# Patient Record
Sex: MALE | Race: WHITE
[De-identification: names, ages, dates, MRNs, and addresses within clinical notes are randomized per-mention and may not be internally consistent; named-entity substitution may affect disease eponyms.]

---

## 2021-06-25 ENCOUNTER — HOSPITAL ENCOUNTER (INPATIENT)
Age: 61
LOS: 14 days | Discharge: NURSING, REHAB OR LONG-TERM CARE | DRG: 180 | End: 2021-07-09
Admitting: FAMILY MEDICINE
Payer: MEDICARE

## 2021-06-25 DIAGNOSIS — Z98.890: ICD-10-CM

## 2021-06-25 DIAGNOSIS — K21.9: ICD-10-CM

## 2021-06-25 DIAGNOSIS — C34.82: Primary | ICD-10-CM

## 2021-06-25 DIAGNOSIS — Z87.81: ICD-10-CM

## 2021-06-25 DIAGNOSIS — Z66: ICD-10-CM

## 2021-06-25 DIAGNOSIS — F17.210: ICD-10-CM

## 2021-06-25 DIAGNOSIS — E78.5: ICD-10-CM

## 2021-06-25 DIAGNOSIS — Z87.39: ICD-10-CM

## 2021-06-25 DIAGNOSIS — C78.7: ICD-10-CM

## 2021-06-25 DIAGNOSIS — M25.569: ICD-10-CM

## 2021-06-25 DIAGNOSIS — R49.0: ICD-10-CM

## 2021-06-25 DIAGNOSIS — J98.11: ICD-10-CM

## 2021-06-25 DIAGNOSIS — Z80.1: ICD-10-CM

## 2021-06-25 DIAGNOSIS — C79.89: ICD-10-CM

## 2021-06-25 DIAGNOSIS — Z99.81: ICD-10-CM

## 2021-06-25 DIAGNOSIS — J20.9: ICD-10-CM

## 2021-06-25 DIAGNOSIS — M19.90: ICD-10-CM

## 2021-06-25 DIAGNOSIS — E11.65: ICD-10-CM

## 2021-06-25 DIAGNOSIS — M48.00: ICD-10-CM

## 2021-06-25 DIAGNOSIS — R59.0: ICD-10-CM

## 2021-06-25 DIAGNOSIS — F32.9: ICD-10-CM

## 2021-06-25 DIAGNOSIS — Z71.6: ICD-10-CM

## 2021-06-25 DIAGNOSIS — Z60.2: ICD-10-CM

## 2021-06-25 DIAGNOSIS — Z79.899: ICD-10-CM

## 2021-06-25 DIAGNOSIS — Z20.822: ICD-10-CM

## 2021-06-25 DIAGNOSIS — J43.9: ICD-10-CM

## 2021-06-25 DIAGNOSIS — T38.0X5A: ICD-10-CM

## 2021-06-25 DIAGNOSIS — Z80.3: ICD-10-CM

## 2021-06-25 DIAGNOSIS — J91.0: ICD-10-CM

## 2021-06-25 DIAGNOSIS — E66.01: ICD-10-CM

## 2021-06-25 DIAGNOSIS — Z87.19: ICD-10-CM

## 2021-06-25 DIAGNOSIS — J96.01: ICD-10-CM

## 2021-06-25 DIAGNOSIS — L29.9: ICD-10-CM

## 2021-06-25 DIAGNOSIS — Z96.643: ICD-10-CM

## 2021-06-25 DIAGNOSIS — Z71.3: ICD-10-CM

## 2021-06-25 DIAGNOSIS — E44.0: ICD-10-CM

## 2021-06-25 DIAGNOSIS — E22.2: ICD-10-CM

## 2021-06-25 PROCEDURE — 84484 ASSAY OF TROPONIN QUANT: CPT

## 2021-06-25 PROCEDURE — 80048 BASIC METABOLIC PNL TOTAL CA: CPT

## 2021-06-25 PROCEDURE — 87070 CULTURE OTHR SPECIMN AEROBIC: CPT

## 2021-06-25 PROCEDURE — 87040 BLOOD CULTURE FOR BACTERIA: CPT

## 2021-06-25 PROCEDURE — 83735 ASSAY OF MAGNESIUM: CPT

## 2021-06-25 PROCEDURE — 84155 ASSAY OF PROTEIN SERUM: CPT

## 2021-06-25 PROCEDURE — 96361 HYDRATE IV INFUSION ADD-ON: CPT

## 2021-06-25 PROCEDURE — 88108 CYTOPATH CONCENTRATE TECH: CPT

## 2021-06-25 PROCEDURE — 76604 US EXAM CHEST: CPT

## 2021-06-25 PROCEDURE — 84100 ASSAY OF PHOSPHORUS: CPT

## 2021-06-25 PROCEDURE — 93005 ELECTROCARDIOGRAM TRACING: CPT

## 2021-06-25 PROCEDURE — 96374 THER/PROPH/DIAG INJ IV PUSH: CPT

## 2021-06-25 PROCEDURE — 85025 COMPLETE CBC W/AUTO DIFF WBC: CPT

## 2021-06-25 PROCEDURE — 88342 IMHCHEM/IMCYTCHM 1ST ANTB: CPT

## 2021-06-25 PROCEDURE — 85610 PROTHROMBIN TIME: CPT

## 2021-06-25 PROCEDURE — 85730 THROMBOPLASTIN TIME PARTIAL: CPT

## 2021-06-25 PROCEDURE — 71045 X-RAY EXAM CHEST 1 VIEW: CPT

## 2021-06-25 PROCEDURE — 36415 COLL VENOUS BLD VENIPUNCTURE: CPT

## 2021-06-25 PROCEDURE — 88341 IMHCHEM/IMCYTCHM EA ADD ANTB: CPT

## 2021-06-25 PROCEDURE — 82805 BLOOD GASES W/O2 SATURATION: CPT

## 2021-06-25 PROCEDURE — 83615 LACTATE (LD) (LDH) ENZYME: CPT

## 2021-06-25 PROCEDURE — 89050 BODY FLUID CELL COUNT: CPT

## 2021-06-25 PROCEDURE — 94760 N-INVAS EAR/PLS OXIMETRY 1: CPT

## 2021-06-25 PROCEDURE — 71046 X-RAY EXAM CHEST 2 VIEWS: CPT

## 2021-06-25 PROCEDURE — 83036 HEMOGLOBIN GLYCOSYLATED A1C: CPT

## 2021-06-25 PROCEDURE — 36600 WITHDRAWAL OF ARTERIAL BLOOD: CPT

## 2021-06-25 PROCEDURE — 36573 INSJ PICC RS&I 5 YR+: CPT

## 2021-06-25 PROCEDURE — 84550 ASSAY OF BLOOD/URIC ACID: CPT

## 2021-06-25 PROCEDURE — 71275 CT ANGIOGRAPHY CHEST: CPT

## 2021-06-25 PROCEDURE — 94640 AIRWAY INHALATION TREATMENT: CPT

## 2021-06-25 PROCEDURE — 87205 SMEAR GRAM STAIN: CPT

## 2021-06-25 PROCEDURE — 85379 FIBRIN DEGRADATION QUANT: CPT

## 2021-06-25 PROCEDURE — 80053 COMPREHEN METABOLIC PANEL: CPT

## 2021-06-25 PROCEDURE — 87635 SARS-COV-2 COVID-19 AMP PRB: CPT

## 2021-06-25 PROCEDURE — 85027 COMPLETE CBC AUTOMATED: CPT

## 2021-06-25 PROCEDURE — 99285 EMERGENCY DEPT VISIT HI MDM: CPT

## 2021-06-25 PROCEDURE — 70553 MRI BRAIN STEM W/O & W/DYE: CPT

## 2021-06-25 PROCEDURE — 88305 TISSUE EXAM BY PATHOLOGIST: CPT

## 2021-06-25 PROCEDURE — 74177 CT ABD & PELVIS W/CONTRAST: CPT

## 2021-06-25 SDOH — SOCIAL STABILITY - SOCIAL INSECURITY: PROBLEMS RELATED TO LIVING ALONE: Z60.2

## 2021-06-26 PROCEDURE — 0W9B3ZX DRAINAGE OF LEFT PLEURAL CAVITY, PERCUTANEOUS APPROACH, DIAGNOSTIC: ICD-10-PCS

## 2021-06-29 PROCEDURE — 02HV33Z INSERTION OF INFUSION DEVICE INTO SUPERIOR VENA CAVA, PERCUTANEOUS APPROACH: ICD-10-PCS

## 2021-07-04 PROCEDURE — 3E04305 INTRODUCTION OF OTHER ANTINEOPLASTIC INTO CENTRAL VEIN, PERCUTANEOUS APPROACH: ICD-10-PCS

## 2021-07-06 PROCEDURE — 0W9B3ZZ DRAINAGE OF LEFT PLEURAL CAVITY, PERCUTANEOUS APPROACH: ICD-10-PCS

## 2021-08-03 ENCOUNTER — HOSPITAL ENCOUNTER (OUTPATIENT)
Dept: HOSPITAL 47 - RADXRMAIN | Age: 61
Discharge: HOME | End: 2021-08-03
Attending: INTERNAL MEDICINE
Payer: MEDICARE

## 2021-08-03 DIAGNOSIS — C34.90: Primary | ICD-10-CM

## 2021-08-03 DIAGNOSIS — I10: ICD-10-CM

## 2021-08-03 DIAGNOSIS — E78.5: ICD-10-CM

## 2021-08-03 DIAGNOSIS — E11.9: ICD-10-CM

## 2021-08-03 PROCEDURE — 71046 X-RAY EXAM CHEST 2 VIEWS: CPT

## 2021-08-03 NOTE — XR
EXAMINATION TYPE: XR chest 2V

 

DATE OF EXAM: 8/3/2021

 

COMPARISON: 7/6/2021

 

TECHNIQUE: PA and lateral views submitted.

 

HISTORY: History of lung cancer

 

FINDINGS:

Diffuse interstitial pattern with left lower lobe consolidation and small effusion. Left-sided PICC l
ine is postsurgical change right shoulder arthropathy of the left shoulder with probable bone infarct
 involving the humeral head. Atherosclerotic change aorta. Underlying COPD.

 

IMPRESSION:

1. Correlate for chronic interstitial lung disease versus interstitial pneumonitis with left lower lo
be infiltrate and small effusion. Mild venous congestion in the differential diagnosis correlate clin
ically.

## 2021-11-05 ENCOUNTER — HOSPITAL ENCOUNTER (OUTPATIENT)
Dept: HOSPITAL 47 - PROCWHC3 | Age: 61
Discharge: HOME | End: 2021-11-05
Attending: INTERNAL MEDICINE
Payer: MEDICARE

## 2021-11-05 ENCOUNTER — HOSPITAL ENCOUNTER (OUTPATIENT)
Dept: HOSPITAL 47 - RADCTMAIN | Age: 61
Discharge: HOME | End: 2021-11-05
Attending: INTERNAL MEDICINE
Payer: MEDICARE

## 2021-11-05 VITALS
SYSTOLIC BLOOD PRESSURE: 121 MMHG | RESPIRATION RATE: 16 BRPM | HEART RATE: 96 BPM | TEMPERATURE: 98.1 F | DIASTOLIC BLOOD PRESSURE: 59 MMHG

## 2021-11-05 DIAGNOSIS — E78.5: ICD-10-CM

## 2021-11-05 DIAGNOSIS — J98.11: ICD-10-CM

## 2021-11-05 DIAGNOSIS — C34.90: Primary | ICD-10-CM

## 2021-11-05 DIAGNOSIS — I10: ICD-10-CM

## 2021-11-05 DIAGNOSIS — C34.92: Primary | ICD-10-CM

## 2021-11-05 DIAGNOSIS — J90: ICD-10-CM

## 2021-11-05 DIAGNOSIS — E11.9: ICD-10-CM

## 2021-11-05 LAB — BUN SERPL-SCNC: 7 MG/DL (ref 9–20)

## 2021-11-05 PROCEDURE — 71260 CT THORAX DX C+: CPT

## 2021-11-05 PROCEDURE — 96523 IRRIG DRUG DELIVERY DEVICE: CPT

## 2021-11-05 PROCEDURE — 84520 ASSAY OF UREA NITROGEN: CPT

## 2021-11-05 PROCEDURE — 74177 CT ABD & PELVIS W/CONTRAST: CPT

## 2021-11-05 PROCEDURE — 82565 ASSAY OF CREATININE: CPT

## 2021-11-05 NOTE — CT
EXAMINATION TYPE: CT ChestAbdPelvis w con

 

DATE OF EXAM: 11/5/2021

 

COMPARISON: 9/2/2021

 

HISTORY: Lung cancer, possible enlarged lymph nodes to neck.

 

CT DLP: 1345.7 mGycm

Automated exposure control for dose reduction was used.

 

CONTRAST: 

CT scan of the chest, abdomen and pelvis is performed with Oral Contrast and with IV Contrast, patien
t injected with 100 mL of Isovue M300.

 

FINDINGS:

 

LUNGS: Background Moderate underlying emphysematous change is redemonstrated. Persistent small left p
leural effusion improved from prior. Mild left basilar linear scarring and/or atelectasis. Tiny left 
pleural effusion. No suspicious nodules or masses clearly seen. Right lung remains clear. Apical soft
 tissue attenuation within the left lung posteriorly stable measures 2.5 cm in greatest axis and prev
iously measured 2.5 cm. Extends to the left hilum is unchanged in appearance. This could be on the ba
sis of atelectasis or scarring rather than neoplasm is not excluded but the findings overall stable.

 

 

MEDIASTINUM: There is marked interval improvement in abnormal lymph nodes in the prevascular region, 
AP window, paratracheal region, and right paratracheal levels. Some irregular confluent elongated 3.7
 x 1.3 cm density in the AP window inferiorly extending towards the left hilum stable. Subcarinal lym
ph node 1.7 x 1.0 cm stable from prior study.. Tiny pericardial effusion is now seen. No cardiomegaly
. Coronary artery calcification noted. Trace of pericardial fluid seen.

 

 

LIVER/GB: No significant abnormality is appreciated.

 

PANCREAS: No significant abnormality is seen.

 

SPLEEN: No significant abnormality is seen.

 

ADRENALS: No significant abnormality is seen.

 

KIDNEYS: No significant abnormality is seen.

 

BOWEL:  No significant abnormality is seen.

 

LYMPH NODES: No greater than 1 cm abdominal or pelvic lymph nodes are appreciated.

 

OSSEOUS STRUCTURES: Metallic hardware from right shoulder surgery is partially imaged. Metallic cover
 from bilateral hip arthroplasties causes streak artifact limiting evaluation of pelvic structures. N
egative osseous structures are redemonstrated demineralized. Vertebroplasty at mild-to-moderate compr
ession fractures T12 and L2 level redemonstrated. Multiple additional compression fractures are seen 
throughout the lumbar and thoracic spine.. Underlying scoliosis noted. Advanced degenerative change l
eft glenohumeral joint

 

OTHER: Moderate to severe calcified plaque of the infrarenal abdominal aorta extends into the iliac b
ranch vessels. Suspected bilateral iliac artery stenoses. Correlate clinically. Left-sided central li
ne or PICC line incidentally noted with tip overlying the SVC.

 

 

IMPRESSION: 

1. Stable previously noted adenopathy unchanged from prior exam. Apical area of consolidation extendi
ng to the left hilum is unchanged from prior exam is not. Extensive changes of emphysema noted

2. . Small left pleural effusion and basilar atelectasis stable.

3. No new areas of mass or adenopathy. Overall the exam is stable from prior exam.

4. Suspect bilateral iliac artery stenoses

## 2021-11-24 ENCOUNTER — HOSPITAL ENCOUNTER (OUTPATIENT)
Dept: HOSPITAL 47 - PNWHC3 | Age: 61
Discharge: HOME | End: 2021-11-24
Attending: STUDENT IN AN ORGANIZED HEALTH CARE EDUCATION/TRAINING PROGRAM
Payer: MEDICARE

## 2021-11-24 VITALS
SYSTOLIC BLOOD PRESSURE: 117 MMHG | RESPIRATION RATE: 18 BRPM | DIASTOLIC BLOOD PRESSURE: 62 MMHG | TEMPERATURE: 98.3 F | HEART RATE: 107 BPM

## 2021-11-24 DIAGNOSIS — Z87.39: ICD-10-CM

## 2021-11-24 DIAGNOSIS — M48.061: ICD-10-CM

## 2021-11-24 DIAGNOSIS — Z96.643: ICD-10-CM

## 2021-11-24 DIAGNOSIS — Z96.611: ICD-10-CM

## 2021-11-24 DIAGNOSIS — Z85.118: ICD-10-CM

## 2021-11-24 DIAGNOSIS — M47.896: Primary | ICD-10-CM

## 2021-11-24 DIAGNOSIS — M54.16: ICD-10-CM

## 2021-11-24 PROCEDURE — 99211 OFF/OP EST MAY X REQ PHY/QHP: CPT

## 2021-11-24 PROCEDURE — 99202 OFFICE O/P NEW SF 15 MIN: CPT

## 2021-11-24 NOTE — P.PAINCN
History of Present Illness





- Reason for Consult


Consult date: 11/24/21





- History of Present Illness





Bhaskar is a 61-year-old male presenting to clinic today for initial evaluation 

for pain management.  Bhaskar has a history of bilateral hip replacement as well

as right shoulder replacement due to avascular necrosis.  This is Priestly 

completed chemotherapy for lung cancer.  Currently looking into immunotherapy 

depending on insurance.  Today he is reporting chronic long-term pain in his low

back there was no precipitating event.  The pain is located to the right side 

and left side of his low back with pain radiating down his legs right equal to 

left pain is radiating to buttocks the lateral portion of thighs to his feet.  

He describes it as a constant sharp pain.  Pain is aggravated with walking and 

long-term standing.  Pain is relieved with sitting.  He has tried medications 

including Norco and tramadol without effect, many years ago he has had 

injections to attempt nerve blocks without success.  Patient also reports she's 

had physical therapy in the past that has not helped and knees had chiropractic 

care in the past which offered him some benefit.  He rates his pain as 10 at 10 

on a 0-to-10 scale.  He denies any bowel or bladder dysfunction, saddle 

anesthesia, or any other red flag symptoms.  He reports that neurosurgery has 

not wanted to do any procedures due to brittle bones in his avascular necrosis.





Past Medical History


Past Medical History: Cancer, COPD, Musculoskeletal Disorder, Osteoarthritis 

(OA)


Additional Past Medical History / Comment(s): LUNG CANCER-currently receiving 

chemo, gastritis, 3 deteriorating discs


History of Any Multi-Drug Resistant Organisms: None Reported


Past Surgical History: Joint Replacement, Orthopedic Surgery


Additional Past Surgical History / Comment(s): mindi hips & rt shoulder replaced


Past Anesthesia/Blood Transfusion Reactions: No Reported Reaction


Smoking Status: Former smoker





- Past Family History


  ** Father


Family Medical History: No Reported History





Medications and Allergies


                                Home Medications











 Medication  Instructions  Recorded  Confirmed  Type


 


Acetylcyst 10% Sol 4 ml INHALATION RT-BID 06/25/21 11/24/21 History


 


Atorvastatin [Lipitor] 20 mg PO DAILY 06/25/21 11/24/21 History


 


OXcarbazepine [Trileptal] 300 mg PO BID 06/25/21 11/24/21 History


 


Pantoprazole Sodium [Protonix] 40 mg PO DAILY 06/25/21 11/24/21 History


 


Sucralfate [Carafate] 1 gm PO ACHS 06/25/21 11/24/21 History


 


traZODone HCL [Desyrel] 100 mg PO HS 06/25/21 11/24/21 History


 


ALPRAZolam [Xanax] 0.25 mg PO TID PRN #6 tab 07/07/21 11/24/21 Rx


 


Acetaminophen Tab [Tylenol] 650 mg PO Q6HR PRN  tab 07/07/21 11/24/21 Rx


 


Budesonide [Pulmicort] 1 mg INHALATION RT-BID  ml 07/07/21 11/24/21 Rx


 


Calcium Carbonate [Tums] 1,000 mg PO TID PRN  chew 07/07/21 11/24/21 Rx


 


Formoterol Fumarate [Perforomist] 20 mcg INHALATION RT-BID  nebu 07/07/21 11/24/21 Rx


 


INSULIN LISPRO (HumaLOG) [humaLOG] 0 unit SQ ACHS #1 vial 07/07/21 11/24/21 Rx


 


Ipratropium-Albuterol Nebulize 3 ml INHALATION Q2HR PRN  ml 07/07/21 11/24/21 Rx





[Duoneb 0.5 mg-3 mg/3 ml Soln]    


 


Ipratropium-Albuterol Nebulize 3 ml INHALATION RT-Q4H  ml 07/07/21 11/24/21 Rx





[Duoneb 0.5 mg-3 mg/3 ml Soln]    


 


Lidocaine Viscous [Xylocaine 30 ml PO QID  ml 07/07/21 11/24/21 Rx





Viscous 2%]    


 


Nicotine 21Mg/24Hr Patch [Habitrol] 1 patch TRANSDERM DAILY  patch 07/07/21 11/24/21 Rx


 


Ondansetron [Zofran] 8 mg PO Q6HR PRN #45 tab 07/07/21 11/24/21 Rx


 


HYDROcodone/APAP 7.5-325MG [Norco 1 tab PO Q6HR PRN 3 Days #12 tab 07/09/21 11/24/21 Rx





7.5-325]    


 


Amitriptyline HCl [Elavil] 1 tab PO DAILY 08/05/21 11/24/21 History








                                    Allergies











Allergy/AdvReac Type Severity Reaction Status Date / Time


 


No Known Allergies Allergy   Verified 11/24/21 11:29














Physical Exam








REVIEW OF ORGAN SYSTEMS:


                     CONSTITUTIONAL:  No fevers or chills. No recent weight 

loss.


                     EYES: denies troubles with vision. 


                     HEENT:  No difficulties with hearing. No nosebleeds.  No 

difficulty swallowing. 


                     RESPIRATORY:  Denies any troubles with breathing or dyspnea

 on exertion.  History of lung cancer


                     CARDIOVASCULAR:  Denies any chest pain, palpitations, or 

recent heart attacks. 


                     GASTROINTESTINAL: Denies fatty food intolerance.  Has 

change in bowel habits and gas bloat.  


                     GENITOURINARY:  Denies any blood in urine.  Has increased 

urinary frequency.  


                      NEUROLOGICAL: +  numbness and tingling along the distal 

extremities. No seizure disorders or headaches.


                      MUSCULOSKELETAL: Has back pain.  Bilateral hip 

arthroplasty and right shoulder arthroplasty; avascular necrosis


                      SKIN:no  skin cancer. No rash.


                      PSYCHIATRIC:  Denies current depression or suicidal 

thoughts.


                      ENDOCRINE:  Denies current thyroid disorders. Denies any 

blood sugar glucose intolerance.


                      HEME/LYMPHATIC: Denies any lumps and bumps around the 

neck.  History of deep venous thrombosis.


                      ALLERGY/IMMUNOLOGY:  No immunoglobulin therapy. No immune 

deficiencies.


                      BREAST: Denies current breast lumps, pain or nipple 

discharge.


    


   Physical Examinations  :


                Constitutiona       : Cooperative , not in acute distress .


                 HEENT               :  nech :  supple ,  no Lymphadenopathy  , 

normal  thyroid  size .


                                         :   eyes   no ptosis , no icterus,  no 

photophobia .  


                                         :     ENT  normal   of hearing  , 

normal  oropharynx     , no Thrush .  


                 Respiratory        : Chest clear to auscultations Bilaterally  

,  no wheezing   , no Rhonchi   .  


                 Cardiovascula    : regular rate and rhythem , S1 ,  S2  ,   no 

 S3 ,  no  S4.


                 Gastrointestina   :  abdomen soft  no tenderness , bowel sounds

  , no organomegally  .


                 Genitourinary     :   Defferred .                              

                                                                                

                                                                                

                                                                                

                                                                                

                      


                 neurologic         :   Cranial nerve II   to  XII  intact ,  no

   focal neurological deffecit  .


                 psychatric          : alert ,  oriented  X 3  ,   appropriate 

affect   , intact judgment  and insight  .  


                 Lymphatic          :    no Lymphadenopathy .


                 musculoskeltal    :     


                                


                                   Lumber spine


                                         moter stegnth lower extremities ,thigh 

and legs  5/5 Right side ,  5/5  Left side 


                                         deep tendon reflexes :   normal  Knee 

Jerk    , normal   ankle Jerk  


                                         lumber facet Loading Test= positive 

Right  , positive Left  


                                         Range of motion of the lumbar spine  

Flexion  30 degrees,   extension   10 degrees


                                         strait leg raising test  , positive at 

  30   degree   


                                         Fabere test= positive Right ,    and  

positive  left  .


                                         Sever tenderness over the  Sacroiliac 

joint  on the Right  ,  and Left  sides   


                                         [place]len  test= positive bilaterally.


                                         Seated flexion test= positive 

bilaterally.





Assessment and Plan


Assessment: 





Assessment and plan











Assessment:


Lumbar spinal stenosis


Lumbar spondylosis with facet arthropathy without myelopathy


Lumbar radiculopathy


Bilateral hip arthroplasty


Right shoulder arthroplasty


History of lung cancer


History of avascular necrosis








Plan:


Patient could benefit from lumbar epidural steroid injection at L4 5.


Consider lumbar medial branch blocks L3 4 and L4 5 in the future up to including

 radiofrequency ablation


Consider spinal cord stimulator











Dr. Cummins was available by phone for consultation during his visit.














I have spent 50 minutes on patient care today. The time was used to review the 

medical records including relevant urine studies and Prescription history 

(MAPs), review of the available imaging, evaluation and examination of the 

patient, coordination of care with the medical staff and if applicable referring

 physicians, as well as creation of the medical record.














- PQRS measures  =


        - Patient's medications are documented in the chart.


         -Tobacco use is negative


         -Patient's has not received pneumococcal vaccine.


         -Advanced care planning discussed, patient not eligible.


         -Opiate contract not signed.


         -Pain positive and follow-up visit/procedure is scheduled.


         -Patient's blood pressure measured 117/62  , and documented in the 

record ,and patient will follow up with the primary care.


         -Patient's weight was measured and body mass index [  ] above the,w

ithin the  normal limits and counseling was done.  and patient instructed to 

follow-up with the primary care physician.


         -Patient was not identified as an unhealthy alcohol user


                                                  


 





Time with Patient: Greater than 30





PQRS Measure Charge Sheet





- Pain Location


  ** Lower Back


Non-Pharmacological Interventions: Chiropractic Treatment, Heat, Inactivity, 

Physical Therapy, Sitting


Pharmacological Interventions: Medication, PRN Medication


PQRS Narrative: 


                                        





Pain Intensity [Lower Back]      10


Scale Used                       Numeric (1 - 10)








Home Medications: 


Ambulatory Orders





Acetylcyst 10% Sol 4 ml INHALATION RT-BID 06/25/21 


Atorvastatin [Lipitor] 20 mg PO DAILY 06/25/21 


OXcarbazepine [Trileptal] 300 mg PO BID 06/25/21 


Pantoprazole Sodium [Protonix] 40 mg PO DAILY 06/25/21 


Sucralfate [Carafate] 1 gm PO ACHS 06/25/21 


traZODone HCL [Desyrel] 100 mg PO HS 06/25/21 


ALPRAZolam [Xanax] 0.25 mg PO TID PRN #6 tab 07/07/21 


Acetaminophen Tab [Tylenol] 650 mg PO Q6HR PRN  tab 07/07/21 


Budesonide [Pulmicort] 1 mg INHALATION RT-BID  ml 07/07/21 


Calcium Carbonate [Tums] 1,000 mg PO TID PRN  chew 07/07/21 


Formoterol Fumarate [Perforomist] 20 mcg INHALATION RT-BID  nebu 07/07/21 


INSULIN LISPRO (HumaLOG) [humaLOG] 0 unit SQ ACHS #1 vial 07/07/21 


Ipratropium-Albuterol Nebulize [Duoneb 0.5 mg-3 mg/3 ml Soln] 3 ml INHALATION 

Q2HR PRN  ml 07/07/21 


Ipratropium-Albuterol Nebulize [Duoneb 0.5 mg-3 mg/3 ml Soln] 3 ml INHALATION 

RT-Q4H  ml 07/07/21 


Lidocaine Viscous [Xylocaine Viscous 2%] 30 ml PO QID  ml 07/07/21 


Nicotine 21Mg/24Hr Patch [Habitrol] 1 patch TRANSDERM DAILY  patch 07/07/21 


Ondansetron [Zofran] 8 mg PO Q6HR PRN #45 tab 07/07/21 


HYDROcodone/APAP 7.5-325MG [Norco 7.5-325] 1 tab PO Q6HR PRN 3 Days #12 tab 

07/09/21 


Amitriptyline HCl [Elavil] 1 tab PO DAILY 08/05/21

## 2022-01-11 ENCOUNTER — HOSPITAL ENCOUNTER (OUTPATIENT)
Dept: HOSPITAL 47 - ORPAIN | Age: 62
Discharge: HOME | End: 2022-01-11
Attending: ANESTHESIOLOGY
Payer: MEDICARE

## 2022-01-11 VITALS — SYSTOLIC BLOOD PRESSURE: 103 MMHG | HEART RATE: 82 BPM | DIASTOLIC BLOOD PRESSURE: 60 MMHG

## 2022-01-11 VITALS — TEMPERATURE: 96.9 F | RESPIRATION RATE: 18 BRPM

## 2022-01-11 VITALS — BODY MASS INDEX: 30.9 KG/M2

## 2022-01-11 DIAGNOSIS — Z96.643: ICD-10-CM

## 2022-01-11 DIAGNOSIS — M50.30: Primary | ICD-10-CM

## 2022-01-11 DIAGNOSIS — M51.16: ICD-10-CM

## 2022-01-11 DIAGNOSIS — Z85.118: ICD-10-CM

## 2022-01-11 DIAGNOSIS — Z96.611: ICD-10-CM

## 2022-01-11 PROCEDURE — 99152 MOD SED SAME PHYS/QHP 5/>YRS: CPT

## 2022-01-11 PROCEDURE — 62323 NJX INTERLAMINAR LMBR/SAC: CPT

## 2022-01-11 RX ADMIN — POTASSIUM CHLORIDE SCH MLS: 14.9 INJECTION, SOLUTION INTRAVENOUS at 13:37

## 2022-01-11 RX ADMIN — POTASSIUM CHLORIDE SCH MLS: 14.9 INJECTION, SOLUTION INTRAVENOUS at 13:17

## 2022-01-11 NOTE — FL
EXAMINATION TYPE: FL guided pain mgmt statistic

 

DATE OF EXAM: 1/11/2022

 

HISTORY: Fluoroscopy  time

 

5 seconds of fluoroscopy provided. 

 

IMPRESSION:

1. Fluoroscopy time.

## 2022-01-11 NOTE — P.PCN
Date of Procedure: 01/11/22


Description of Procedure: 


Procedure: 


1.   L4-L5 Epidural steroid injection under fluoroscopic guidance # 1/3 , 


2.   Lumbar epidurogram





PREOPERATIVE DIAGNOSIS:  Lumbar degenerative disc disease, and Lumbar 

radiculopathy.





POSTOPERATIVE DIAGNOSIS: Lumbar degenerative disc disease, and Lumbar 

radiculopathy.





SURGEON: Michael Asencio 





ANESTHESIA:  Local with 1% lidocaine, and IV sedation as per anesthesia record





EBL: None.





Specimen removed: None





Fluoroscopic image: saved to electronic medical records





PROCEDURE INDICATION:  The patient had history of Lumbar degenerative disc 

disease and Lumbar radiculopathy. Failed to conservative therapy. Presented for 

epidural steroid injection.





PROCEDURE DESCRIPTION: The patient was seen and identified in the preoperative 

area. Risks, benefits, complications, and alternatives were discussed with the 

patient. The patient agreed to proceed with the procedure and signed the 

consent. IV was started, and vital signs were stable.





Patient was taken to the procedure area, and time out was completed. The patient

was placed in the prone position on procedure table and a pillow was placed 

under the abdomen to reduce lumbar lordosis. The lumbosacral area was prepped 

and draped in the usual sterile fashion. Critical pause was taken. Vital signs 

were closely monitored during the procedure.





Using anterior-posterior fluoroscopy, the L4-L5  interlaminar space was 

identified, and skin and deeper tissues were localized with 1% lidocaine. Using 

anterior-posterior fluoroscopy, lateral fluoroscopy, and loss-of-resistance 

technique, a 20 gauge 3.5 Tuohy epidural needle entered the epidural space. 

After negative aspiration of CSF and blood with no paresthesias, 2 ml of 

[id number] contrast dye was injected and an excellent epidurogram was seen. Again

after negative aspiration of CSF and blood with no paresthesias, 8  mL of block 

solution was injected into the epidural space. Block solution contained 80 mg of

Depo-Medrol, and 7 mL of preservative-free normal saline. Needle was withdrawn 

intact, skin was cleansed, and bandages were applied. 





COMPLICATIONS: None.





DISPOSITION / PLANS: The patient was placed in a supine position and transferred

to the recovery area in a stable condition for observation. Patient was 

discharged from the recovery room after meeting discharge criteria. Home 

discharge instructions given to the patient by the staff. The patient was 

reexamined prior to discharge. The patient will schedule a follow up in the 

clinic in 4 weeks.

## 2022-02-01 ENCOUNTER — HOSPITAL ENCOUNTER (OUTPATIENT)
Dept: HOSPITAL 47 - RADCTMAIN | Age: 62
Discharge: HOME | End: 2022-02-01
Attending: INTERNAL MEDICINE
Payer: MEDICARE

## 2022-02-01 DIAGNOSIS — Z03.89: Primary | ICD-10-CM

## 2022-02-01 DIAGNOSIS — C34.92: ICD-10-CM

## 2022-02-01 LAB — BUN SERPL-SCNC: 14 MG/DL (ref 9–20)

## 2022-02-01 PROCEDURE — 71260 CT THORAX DX C+: CPT

## 2022-02-01 PROCEDURE — 82565 ASSAY OF CREATININE: CPT

## 2022-02-01 PROCEDURE — 74177 CT ABD & PELVIS W/CONTRAST: CPT

## 2022-02-01 PROCEDURE — 84520 ASSAY OF UREA NITROGEN: CPT

## 2022-02-01 PROCEDURE — 36415 COLL VENOUS BLD VENIPUNCTURE: CPT

## 2022-02-01 NOTE — CT
EXAMINATION TYPE: CT ChestAbdPelvis w con

 

DATE OF EXAM: 2/1/2022

 

COMPARISON: 11/5/2021

 

HISTORY: h/o lung CA, obs for mets

 

CT DLP: 1414.2 mGycm

 

CONTRAST: 

CT scan of the chest, abdomen and pelvis is performed with Oral Contrast and with IV Contrast, patien
t injected with 100 mL of Isovue 300.

 

CT  Chest:

LUNGS: There is an enlarging left upper lobe mass extending from the left hilum to the left upper lob
e with pleural extension. The mass measures approximately 7 cm in craniocaudal dimension by 2.8 cm in
 AP dimension. There is a new pleural-based satellite nodule left lower lobe measuring 9.2 mm. Additi
onal satellite nodule left lower lobe measuring 2 mm and 6 mm respectively.

 

MEDIASTINUM: There is new adenopathy noted in the right paratracheal region measuring 2.7 cm. Precari
nal adenopathy measures 1.3 cm and right tracheobronchial adenopathy measures 1.3 cm. AP window adeno
iesha measures 2.1 cm.

 

HILAR STRUCTURES: No evidence for mass.  No hilar adenopathy is appreciated.

 

OTHER: No significant abnormality.

 

CONTRAST CT ABDOMEN AND PELVIS FINDINGS: 

 

LIVER/GB:   No calcified gallstones.  No space occupying hepatic lesion. Biliary tree is of normal ca
liber. 

 

PANCREAS:  No inflammation.  No distinct mass. 

 

SPLEEN:  No splenic enlargement.  No lesion seen. 

 

ADRENALS:  No nodule.  No thickening. 

 

KIDNEYS/BLADDER:  No hydronephrosis.  No nephrolithiasis.  No disctinct renal mass. 

 

BOWEL: Normal appendix.  Normal bowel caliber.  No inflammation. 

 

GENITAL ORGANS:  No gross abnormality. 

 

LYMPH NODES:  No greater than 1cm abdominal or pelvic lymph nodes are appreciated.

 

AORTA: No significant abnormality. 

 

OSSEOUS STRUCTURES: Multiple compression deformities seen throughout the thoracic and lumbar spines u
nchanged from prior study.

 

OTHER:  No significant additional abnormality is seen.  

 

IMPRESSION: 

1. Left upper lobe neoplasm with new hilar or mediastinal adenopathy compatible with malignancy.

## 2022-02-07 ENCOUNTER — HOSPITAL ENCOUNTER (OUTPATIENT)
Dept: HOSPITAL 47 - PNWHC3 | Age: 62
Discharge: HOME | End: 2022-02-07
Attending: PHYSICIAN ASSISTANT
Payer: MEDICARE

## 2022-02-07 VITALS
TEMPERATURE: 98.4 F | SYSTOLIC BLOOD PRESSURE: 127 MMHG | HEART RATE: 100 BPM | RESPIRATION RATE: 18 BRPM | DIASTOLIC BLOOD PRESSURE: 57 MMHG

## 2022-02-07 DIAGNOSIS — M51.36: ICD-10-CM

## 2022-02-07 DIAGNOSIS — M50.30: Primary | ICD-10-CM

## 2022-02-07 DIAGNOSIS — M54.50: ICD-10-CM

## 2022-02-07 PROCEDURE — 99211 OFF/OP EST MAY X REQ PHY/QHP: CPT

## 2022-02-07 NOTE — P.PN
Subjective


Progress Note Date: 02/07/22


Principal diagnosis: 





A  61  yr old male with a history of severe and chronic low back pain secondary 

to lumbar degenerative disc diseases and lumbar spondylosis with facet 

arthropathy presents today for follow-up status post LESI of the L4-L5.  Patient

states he experienced 0% pain relief with the procedure. Pain level is at 2 out 

of 10 in intensity at rest but escalates to a 10 out of 10 in intensity when 

standing for peers of 30 minutes or more.  Pain is dull/ achy in the lumbar 

spine with sharp/ shooting character towards the bilateral hips. Pain is 

alleviated with medications, injections, physical therapy, chiropractic 

treatment, home exercise regimen, massage and rest.





Interventional pain procedures completed include L4-L5 LESI


Patient is currently on Motrin OTC


Patient denies any side effects of the medication(s), denies excessive 

drowsiness or sleepiness, denies suicidal ideation and reports that the current 

pain medication is  helping to control the  pain and improve activities of daily

living.


Patient denies any motor or sensory deficits. Patient denies any fever or night 

sweats, denies any change in the bowel movements or urination.


 


Physical Examination:


  -Constitutional: Cooperative. Not in acute distress .


  -HEENT:  Neck is supple. No lymphadenopathy. No thyromegaly. Normal  thyroid  

size.


                       Eyes:  No ptosis , no icterus,  no photophobia.  


                       ENT: No auditory deficits. Normal oropharynx. No Thrush. 




 - Respiratory:  Chest clear to auscultations bilaterally. No wheezing. No 

rhonchi.  


 - Cardiovascular:  Regular rate and rhythm.  S1 /  S2  ,   no  S3 ,  no  S4.


 - Gastrointestinal: Abdomen soft  no tenderness. Bowel sounds positive in all 

four quadrants. No organomegaly.


 - Genitourinary:  Deferred.                                                    

                                                                                

                                                                                

                                                                                

                                                                            


 - Neurologic:  Cranial nerve II to  XII  intact. No focal neurological 

deficits.


 - Psychatric: Alert & oriented x 3. Matching mood & appropriate affect. 

Judgment and insight intact.  


 - Lymphatic:  No Lymphadenopathy.


 - Musculoskeletal:     


Cervical spine: Muscle bulk/ tone/ strength in the bilateral upper extremities 

normal.


      Facet loading test cervical area positive.   


                                                                                

                                                                                

                                                                                

                                                                                

                                                                                

    


Lumbar spine: Motor bulk/ tone/ strength  lower extremities , thigh and legs : 

5/5 


     Deep tendon reflexes :   Normal  Knee Jerk. Normal Ankle Jerk  .


     Mild vertebral body tenderness over the L4 and L5


      Lumbar Facet Loading Test  positive - mild


     Straight Leg Raise: positive at  30  degree right side/ left side  


     Mirna test: positive  right side /  left side


     Range of motion: Flexion of the lumbar spine <75 degrees


      Range of motion: Extension of the lumbar spine <20 degrees


     Severe tenderness over the Sacroiliac joint:  right side / left side 





Assessment and plan:


       Chronic low back pain secondary to lumbar degenerative disc disease , 

lumbar spondylosis with facet arthropathy without myelopathy 


       Recommendation of LESI L4-L5 #2


       Tylenol 3 one tablet twice daily when necessary pain dispense 60 with 1 

refill


       Urine collected for UDS


       Opioid agreement signed


       Chronic and current use of high-risk medication (Opioids).


       The patient was counseled about risk of opioid use, psychological risk 

associated with opioids and was orally counseled to not overuse ,


       divert or sell medications. Pt is to store medication in a safe location.

                     


       The  patient is counseled against driving while using narcotic 

medications and also not to use alcohol or any illicit recreational drugs. 


       Patient verbalized understanding that the lack of compliance will result 

in failure to renew narcotic prescription(s) as well as possible discharge from 

the clinic


       Diagnoses, prognosis and treatment options including but not limited to 

physical therapy, surgical interventions, interventional therapies and 

medication management including narcotics and adjuvant medication were 

discussed.


       All patient questions answered 


       MAPS reviewed and it was appropriate.





I have spent 31 minutes on patient care today. Dr Cummins was available by 

phone for the evaluation of this patient. The time was used to review the 

medical records including relevant urine studies and Prescription history 

(MAPs), review of the available imaging, evaluation and examination of the 

patient, coordination of care with the medical staff and if applicable referring

physicians, as well as creation of the medical record


 





Objective





- Vital Signs


Vital signs: 


                                   Vital Signs











Temp  98.4 F   02/07/22 13:38


 


Pulse  100   02/07/22 13:38


 


Resp  18   02/07/22 13:38


 


BP  127/57   02/07/22 13:38


 


Pulse Ox  92 L  02/07/22 13:38














PQRS Measure Charge Sheet


Mode of Arrival: Ambulatory, Wheelchair





- Pain Location


  ** Lower Back


Non-Pharmacological Interventions: Chiropractic Treatment, Heat, Home Exercise, 

Ice, Inactivity, Massage, Physical Therapy, Sitting, Stretching


Pharmacological Interventions: Epidural, PRN Medication


PQRS Narrative: 


                                        





Blood Pressure                   127/57


Pain Intensity [Lower Back]      2


Scale Used                       Numeric (1 - 10)


Hx Alcohol Use (MH)              No








Home Medications: 


Ambulatory Orders





Acetylcyst 10% Sol 4 ml INHALATION RT-BID 06/25/21 


Atorvastatin [Lipitor] 20 mg PO DAILY 06/25/21 


OXcarbazepine [Trileptal] 300 mg PO BID 06/25/21 


Pantoprazole Sodium [Protonix] 40 mg PO DAILY 06/25/21 


Sucralfate [Carafate] 1 gm PO ACHS 06/25/21 


traZODone HCL [Desyrel] 100 mg PO HS 06/25/21 


ALPRAZolam [Xanax] 0.25 mg PO TID PRN #6 tab 07/07/21 


Budesonide [Pulmicort] 1 mg INHALATION RT-BID  ml 07/07/21 


Calcium Carbonate [Tums] 1,000 mg PO TID PRN  chew 07/07/21 


Formoterol Fumarate [Perforomist] 20 mcg INHALATION RT-BID  nebu 07/07/21 


Ipratropium-Albuterol Nebulize [Duoneb 0.5 mg-3 mg/3 ml Soln] 3 ml INHALATION 

RT-Q4H  ml 07/07/21 


Nicotine 21Mg/24Hr Patch [Habitrol] 1 patch TRANSDERM DAILY  patch 07/07/21 


Melatonin 3 mg PO HS 01/04/22 


Atezolizumab [Tecentriq] 1 dose IV Q21D 02/03/22 


Ibuprofen 200 - 400 mg PO Q6H PRN 02/03/22

## 2022-03-03 ENCOUNTER — HOSPITAL ENCOUNTER (OUTPATIENT)
Dept: HOSPITAL 47 - ORPAIN | Age: 62
End: 2022-03-03
Attending: ANESTHESIOLOGY
Payer: MEDICARE

## 2022-03-03 VITALS — SYSTOLIC BLOOD PRESSURE: 123 MMHG | DIASTOLIC BLOOD PRESSURE: 70 MMHG | HEART RATE: 91 BPM | RESPIRATION RATE: 16 BRPM

## 2022-03-03 VITALS — TEMPERATURE: 97.7 F

## 2022-03-03 VITALS — BODY MASS INDEX: 31.9 KG/M2

## 2022-03-03 DIAGNOSIS — Z96.643: ICD-10-CM

## 2022-03-03 DIAGNOSIS — J44.9: ICD-10-CM

## 2022-03-03 DIAGNOSIS — M51.16: Primary | ICD-10-CM

## 2022-03-03 DIAGNOSIS — Z98.890: ICD-10-CM

## 2022-03-03 DIAGNOSIS — E11.9: ICD-10-CM

## 2022-03-03 DIAGNOSIS — I10: ICD-10-CM

## 2022-03-03 DIAGNOSIS — K21.9: ICD-10-CM

## 2022-03-03 PROCEDURE — 62323 NJX INTERLAMINAR LMBR/SAC: CPT

## 2022-03-03 NOTE — P.PCN
Date of Procedure: 03/03/22


Description of Procedure: 


Procedure: 


1.  L4-L5 Epidural steroid injection under fluoroscopic guidance #2, 


2.   Lumbar epidurogram





PREOPERATIVE DIAGNOSIS:  Lumbar degenerative disc disease, and Lumbar 

radiculopathy.





POSTOPERATIVE DIAGNOSIS: Lumbar degenerative disc disease, and Lumbar rad

iculopathy.





SURGEON: Michael Asencio 





ANESTHESIA:  Local with 1% lidocaine, and IV sedation: None. 





EBL: None.





Specimen removed: None





Fluoroscopic image: saved to electronic medical records





PROCEDURE INDICATION:  The patient had history of Lumbar degenerative disc 

disease and Lumbar radiculopathy.    Failed to conservative therapy.  Came here 

for repeat epidural steroid injection.





PROCEDURE DESCRIPTION: The patient was seen and identified in the preoperative 

area. Risks, benefits, complications, and alternatives were discussed with the 

patient. The patient agreed to proceed with the procedure and signed the 

consent. IV was started, and vital signs were stable.





Patient was taken to the procedure area, and time out was completed. The patient

was placed in the prone position on procedure table and a pillow was placed 

under the abdomen to reduce lumbar lordosis. The lumbosacral area was prepped 

and draped in the usual sterile fashion. Critical pause was taken. Vital signs 

were closely monitored during the procedure.





Using anterior-posterior fluoroscopy, the L4-L5 interlaminar space was 

identified, and skin and deeper tissues were localized with 1% lidocaine. Using 

anterior-posterior fluoroscopy, lateral fluoroscopy, and loss-of-resistance 

technique, a 20 gauge 3.5 Tuohy epidural needle entered the epidural space. 

After negative aspiration of CSF and blood with no paresthesias, 2 ml of 

[id number] contrast dye was injected and an excellent epidurogram was seen. Again

after negative aspiration of CSF and blood with no paresthesias, 7 mL of block 

solution was injected into the epidural space. Block solution contained 80 

Kenalog ,  and 5 mL of preservative-free normal saline. Needle was withdrawn 

intact, skin was cleansed, and bandages were applied. 





COMPLICATIONS: None.





DISPOSITION / PLANS: The patient was placed in a supine position and transferred

to the recovery area in a stable condition for observation. Patient was 

discharged from the recovery room after meeting discharge criteria. Home 

discharge instructions given to the patient by the staff. The patient was 

reexamined prior to discharge. The patient will schedule a follow up in the 

clinic in 4 weeks.

## 2022-03-09 ENCOUNTER — HOSPITAL ENCOUNTER (OUTPATIENT)
Dept: HOSPITAL 47 - RADCTMAIN | Age: 62
Discharge: HOME | End: 2022-03-09
Attending: INTERNAL MEDICINE
Payer: MEDICARE

## 2022-03-09 DIAGNOSIS — R06.02: ICD-10-CM

## 2022-03-09 DIAGNOSIS — C34.92: Primary | ICD-10-CM

## 2022-03-09 LAB — BUN SERPL-SCNC: 14 MG/DL (ref 9–20)

## 2022-03-09 PROCEDURE — 71275 CT ANGIOGRAPHY CHEST: CPT

## 2022-03-09 PROCEDURE — 82565 ASSAY OF CREATININE: CPT

## 2022-03-09 PROCEDURE — 84520 ASSAY OF UREA NITROGEN: CPT

## 2022-03-24 ENCOUNTER — HOSPITAL ENCOUNTER (OUTPATIENT)
Dept: HOSPITAL 47 - RADCTMAIN | Age: 62
Discharge: HOME | End: 2022-03-24
Attending: INTERNAL MEDICINE
Payer: MEDICARE

## 2022-03-24 DIAGNOSIS — C34.92: ICD-10-CM

## 2022-03-24 DIAGNOSIS — Z03.89: Primary | ICD-10-CM

## 2022-03-24 DIAGNOSIS — E11.9: ICD-10-CM

## 2022-03-24 LAB — BUN SERPL-SCNC: 17 MG/DL (ref 9–20)

## 2022-03-24 PROCEDURE — 74177 CT ABD & PELVIS W/CONTRAST: CPT

## 2022-03-24 PROCEDURE — 82565 ASSAY OF CREATININE: CPT

## 2022-03-24 PROCEDURE — 71260 CT THORAX DX C+: CPT

## 2022-03-24 PROCEDURE — 84520 ASSAY OF UREA NITROGEN: CPT

## 2022-03-25 NOTE — CT
EXAMINATION TYPE: CT ChestAbdPelvis w con

 

DATE OF EXAM: 3/24/2022

 

COMPARISON: CT dated 2/1/2022

 

HISTORY: h/o lung CA, obs for mets

 

CT DLP: 1448.6 mGycm

Automated exposure control for dose reduction was used.

 

CONTRAST: 

CT scan of the chest, abdomen and pelvis is performed with Oral Contrast and with IV Contrast, patien
t injected with 100 mL of Isovue 300.

 

FINDINGS:

 

LUNGS: Slightly smaller left upper lobe posterior mass extending from the left hilum up to the left l
james apex. It measures up to 2.5 cm in the left hilum compared to 2.8 cm previously. It is inseparable
 from the bifurcation of the left pulmonary artery. The left lower lobe superior segment pleural-base
d posterior nodule is larger measuring 13 mm compared to 9 mm previously. Newly seen 5 mm nodule king
g the inferior aspect of the left oblique fissure. Minimal infiltration is newly seen in the right up
per lobe centrally, nonspecific. No other definite new lung nodule identified. Persistent areas of th
ickening along the left pleura, difficult to precisely compare and probably metastatic.

 

MEDIASTINUM: Larger retrocaval lymph node measuring 3.1 cm compared to 2.7 cm previously. An aortopul
monary conglomerate of lymph nodes measures 3.6 cm compared to 2.9 cm previously. Unchanged heart and
 mediastinal vessels. Small pericardial fluid. Larger left pericardiophrenic lymph node measuring 8mm
 compared to 3 mm previously.

 

OTHER:  Slightly larger lytic area in the axillary portion of the left fifth rib, possibly metastatic
. Please correlate with bone scan results. Osteopenia. Multilevel vertebral body collapse and vertebr
oplasty, appreciated previously.

 

LIVER/GB: No definite hepatic focal lesion. The gallbladder is not distended.

 

PANCREAS: Suspicious centrally necrotic nodule inseparable from the superior aspect of the pancreatic
 neck measuring 14 mm compared to 8mm previously, likely metastatic.

 

SPLEEN: No significant abnormality is seen.

 

ADRENALS: Larger left retroperitoneal nodule inseparable from the left adrenal gland measuring 2 cm c
ompared to 17 mm previously. Unremarkable right adrenal.

 

KIDNEYS: No significant abnormality is seen.

 

BOWEL:  No significant abnormality is seen.

 

REPRODUCTIVE ORGANS: Obscured by artifacts.

 

LYMPH NODES: No other pathologically enlarged lymph nodes in the abdomen or the pelvis.

 

OSSEOUS STRUCTURES: Osteopenia. Multilevel vertebral body collapse and vertebroplasty. Bilateral tota
l hip arthroplasty causing beam hardening artifacts on the adjacent pelvic structures.

 

OTHER: Extensive arterial atherosclerotic calcifications. Fat-containing umbilical hernia. No sizable
 ascites.

 

IMPRESSION: Slightly smaller left lung mass however there is interval progression the mediastinal lym
phadenopathy, upper abdominal lymph nodes/metastatic nodules, pericardiophrenic lymph nodes with at l
east one new lung nodule as described above. This is suggestive of progressive disease. Slightly more
 prominent lytic lesion in the axillary portion of the left fifth rib, please correlate with bone sca
n results. Other interval changes as described above.

## 2022-04-19 ENCOUNTER — HOSPITAL ENCOUNTER (OUTPATIENT)
Dept: HOSPITAL 47 - ORPAIN | Age: 62
Discharge: HOME | End: 2022-04-19
Attending: ANESTHESIOLOGY
Payer: MEDICARE

## 2022-04-19 VITALS — DIASTOLIC BLOOD PRESSURE: 79 MMHG | HEART RATE: 104 BPM | SYSTOLIC BLOOD PRESSURE: 121 MMHG

## 2022-04-19 VITALS — BODY MASS INDEX: 31.9 KG/M2

## 2022-04-19 VITALS — TEMPERATURE: 98 F | RESPIRATION RATE: 16 BRPM

## 2022-04-19 DIAGNOSIS — G89.29: Primary | ICD-10-CM

## 2022-04-19 DIAGNOSIS — M51.36: ICD-10-CM

## 2022-04-19 DIAGNOSIS — M47.816: ICD-10-CM

## 2022-04-19 PROCEDURE — 99152 MOD SED SAME PHYS/QHP 5/>YRS: CPT

## 2022-04-19 PROCEDURE — 64493 INJ PARAVERT F JNT L/S 1 LEV: CPT

## 2022-04-19 PROCEDURE — 64494 INJ PARAVERT F JNT L/S 2 LEV: CPT

## 2022-04-19 NOTE — P.PCN
Date of Procedure: 04/19/22


Procedure(s) Performed: 














PREOPERATIVE DIAGNOSIS   :   1-  Lumbar spondylosis with  Facet Arthropathy 

without myelopathy .  2- Lumber degenerative disc disease





POSTOPERATIVE DIAGNOSIS:   1-  Lumbar spondylosis with  Facet Arthropathy 

without myelopathy .  2- Lumber degenerative disc disease


 


PROCEDURE: Diagnostic  bilateral L3  , L4  , and L5 medial branch block under  

fluoroscopy guidance(fluoroscopy images available in the radiology Department )


                                ( To target the facet joint between bilateral 

L4-5 , and L5-S1 )





ANESTHESIA:, moderate sedation with intravenous Versed  2  mg and Fentanyl 100  

mcg.


EBL: Minimal





COMPLICATION: None


 


PROCEDURE INDICATION: Chronic low back pain secondary to Facet arthropathy 

unresponsive to conservative treatment.  





PROCEDURE DESCRIPTION:  the patient was seen and identified in the preop holding

area , risks and benefits and possible complications of the procedure and 

alternative                                                                     

                                                                                

                                                                                

                                            were discussed with the patient, and

the patient agreed  to proceed with the procedure and signed the consent        

                                                                                

                                                 and vital signs monitored 

during the  procedure and fluoroscopy was used to maximize the benefit and 

accuracy of the needle placement, and sedation was given to decrease patient  

anxiety, patient was taken to the procedure room and placed in prone position 

vital signs monitored in the back prepped with chlorhexidine X3 then under 

strict sterile technique using a right oblique fluoroscopy ,the  junction of the

transverse process and the superior articulating process of the right L3 , L4 , 

and L5  vertebra which corresponding to the fluoroscopy image of the eye of the 

Homer dog on the block side for the medial branches and subsequently , after 

local infiltration of skin and subcu tissuies with Ropivacaine 0.5 %  , one mL  

at  each level ,then  22-gauge Quincke-type needles , 3 needle was used  , each 

one of them placed at the junction of the base of the transverse process and the

superior articular process at the appropriate level, and the needle was advanced

until the periosteum contacted, needle placement confirmed with AP oblique and 

lateral view and after appropriate needle placement confirmed, and after 

negative aspiration for heme and CSF and there was no paresthesia 1-1/2 mL of 

Ropivacaine 0.5% mixed with 20 mg Depo-Medrol , then  half mL injected at each 

level after negative aspiration the needle subsequently removed and the same 

procedure repeated for the left side at left side at  L3 , L4 and L5 levels.


At the end of the procedure and the needles removed and a bandage applied after 

the skin was cleaned the cleaning solution patient taken to recovery room in 

stable condition and monitors in the recovery room for 20-30 minutes and 

discharged home in stable condition after discharge criteria met and patient 

will follow up with the pain clinic in 2-4 weeks

## 2022-04-19 NOTE — FL
EXAMINATION TYPE: FL guided pain mgmt statistic

 

DATE OF EXAM: 4/19/2022

 

HISTORY: Fluoroscopy  time

 

10 seconds of fluoroscopy provided. 

 

IMPRESSION:

1. Fluoroscopy time.

## 2022-04-22 ENCOUNTER — HOSPITAL ENCOUNTER (OUTPATIENT)
Dept: HOSPITAL 47 - RADNMMAIN | Age: 62
Discharge: HOME | End: 2022-04-22
Attending: FAMILY MEDICINE
Payer: MEDICARE

## 2022-04-22 DIAGNOSIS — M89.9: ICD-10-CM

## 2022-04-22 DIAGNOSIS — C34.92: Primary | ICD-10-CM

## 2022-04-22 PROCEDURE — 78306 BONE IMAGING WHOLE BODY: CPT

## 2022-04-25 NOTE — NM
EXAMINATION TYPE: NM bone scan whole body

 

DATE OF EXAM: 4/22/2022

 

COMPARISON: 3/24/2022 CT scan

 

HISTORY: Low back and chest pain

 

Delayed whole-body scanning was performed following the injection of 22.5 mCi Tc 99m MDP.  Images acq
uired 3.25 hours post injection.

 

FINDINGS: 

Photopenic defects involving the hips compatible with previous surgery.

Mild intensity uptake involving the feet, ankles and knees most typical of arthritic change.

Photopenic defect involving the right shoulder compatible with previous surgery. Moderate increased u
ptake involving the left shoulder suggestive of severe arthropathy.

 

There is a scoliosis of the spine with multilevel mild to moderate intensity uptake throughout the lo
wer cervical, thoracic and lumbar spine appears most likely degenerative. Areas of vertebral plasty a
nd compression fracture also likely account for some of the areas of increased uptake.

Abnormal uptake involving the left rib cage suggests prior trauma.

 

 

IMPRESSION:

1. Abnormal uptake noted above suggestive of postsurgical changes, degenerative changes of the spine 
with evidence of previous vertebroplasty and compression fracture and post arthritic changes as discu
ssed above.

2. Intense area of abnormal uptake involving the left rib cage appears to correspond to the CT abnorm
ality of a fracture. Review of the previous CT scan does demonstrate some pleural-based thickening th
ere. This area should be followed to exclude pathologic fracture. Correlate for history of previous t
rauma.

## 2022-05-02 ENCOUNTER — HOSPITAL ENCOUNTER (OUTPATIENT)
Dept: HOSPITAL 47 - CATHCVL | Age: 62
Discharge: HOME | End: 2022-05-02
Attending: RADIOLOGY
Payer: MEDICARE

## 2022-05-02 ENCOUNTER — HOSPITAL ENCOUNTER (OUTPATIENT)
Dept: HOSPITAL 47 - PNWHC3 | Age: 62
Discharge: HOME | End: 2022-05-02
Attending: ANESTHESIOLOGY
Payer: MEDICARE

## 2022-05-02 VITALS — TEMPERATURE: 98.5 F

## 2022-05-02 VITALS
SYSTOLIC BLOOD PRESSURE: 128 MMHG | DIASTOLIC BLOOD PRESSURE: 73 MMHG | HEART RATE: 117 BPM | RESPIRATION RATE: 18 BRPM | TEMPERATURE: 97.9 F

## 2022-05-02 VITALS — HEART RATE: 110 BPM | DIASTOLIC BLOOD PRESSURE: 72 MMHG | SYSTOLIC BLOOD PRESSURE: 148 MMHG | RESPIRATION RATE: 16 BRPM

## 2022-05-02 VITALS — BODY MASS INDEX: 30.9 KG/M2

## 2022-05-02 DIAGNOSIS — Z45.2: Primary | ICD-10-CM

## 2022-05-02 DIAGNOSIS — M47.896: Primary | ICD-10-CM

## 2022-05-02 LAB — BUN SERPL-SCNC: 12 MG/DL (ref 9–20)

## 2022-05-02 PROCEDURE — 99211 OFF/OP EST MAY X REQ PHY/QHP: CPT

## 2022-05-02 PROCEDURE — 36573 INSJ PICC RS&I 5 YR+: CPT

## 2022-05-02 PROCEDURE — 82565 ASSAY OF CREATININE: CPT

## 2022-05-02 PROCEDURE — 84520 ASSAY OF UREA NITROGEN: CPT

## 2022-05-02 NOTE — P.PN
Subjective


Progress Note Date: 05/02/22


Principal diagnosis: 





A  61  yr old male with a history of severe and chronic low back pain secondary 

to lumbar degenerative disc diseases and lumbar spondylosis with facet 

arthropathy presents today for evaluation s/p FB of the medial branches BL L4-

L5, L5-S1 #1.  He states he experienced 80% pain relief for 1 day status post 

procedure.  Pain level is currently at 4 out of 10 in intensity, sore, sharp, 

fluctuant pain that waxes and wanes throughout the day based on activity but is 

localized in the mid to lower aspects of his lumbar spine with radiation of 

sharp pain to the right groin and right knee. Pain is alleviated with 

medications although Norco 5/325 has been ineffective per patient, Tylenol OTC, 

injections, ice, heat, physical therapy years ago, chiropractic treatments 1 

year ago but was discharged due to multiple hardware placement in his spine, 

home stretching regimen and rest.





Interventional pain procedures completed include FB/MBBs L3-5 #1


Patient is currently on Norco 5/325mg, Tylenol OTC


Patient denies any side effects of the medication(s), denies excessive 

drowsiness or sleepiness, denies suicidal ideation and reports that the current 

pain medication is  helping to control the  pain and improve activities of daily

living.


Patient denies any motor or sensory deficits. Patient denies any fever or night 

sweats, denies any change in the bowel movements or urination.


 


Physical Examination:


  -Constitutional: Cooperative. Not in acute distress .


  -HEENT:  Neck is supple. No lymphadenopathy. No thyromegaly. Normal  thyroid  

size.


                       Eyes:  No ptosis , no icterus,  no photophobia.  


                       ENT: No auditory deficits. Normal oropharynx. No Thrush. 




 - Respiratory:  Chest clear to auscultations bilaterally. No wheezing. No 

rhonchi.  


 - Cardiovascular:  Regular rate and rhythm.  S1 /  S2  ,   no  S3 ,  no  S4.


 - Gastrointestinal: Abdomen soft  no tenderness. Bowel sounds positive in all 

four quadrants. No organomegaly.


 - Genitourinary:  Deferred.                                                    

                                                                                

                                                                                

                                                                                

                                                                            


 - Neurologic:  Cranial nerve II to  XII  intact. No focal neurological 

deficits.


 - Psychatric: Alert & oriented x 3. Matching mood & appropriate affect. 

Judgment and insight intact.  


 - Lymphatic:  No Lymphadenopathy.


 - Musculoskeletal:     


Cervical spine: Muscle bulk/ tone/ strength in the bilateral upper extremities 

normal.


      Facet loading test cervical area positive.   


                                                                                

                                                                                

                                                                                

                                                                                

                                                                                

    


Lumbar spine: 


     Motor bulk/ tone/ strength  lower extremities , thigh and legs : 5/5 


     Deep tendon reflexes :   Normal  Knee Jerk. Normal Ankle Jerk  .


     Vertebral body tenderness to palpation over 


     Lumbar Facet Loading Test  positive 


     Straight Leg Raise: positive at  30  degrees right side/ left side 


     Gaenslen's Test positive  





Sacral spine :


     Severe tenderness over the Sacroiliac joint:  right side / left side 


     Range of motion: Flexion of the lumbar spine <60 degrees


     Range of motion: Extension of the lumbar spine <20 degrees


     Gaenslen's Test positive


     Mirna test: positive  right side /  left side





Imaging:


Body Scan from 4/22/22 reviewed.





Assessment and plan:


       Chronic low back pain secondary to lumbar degenerative disc disease , 

lumbar spondylosis with facet arthropathy without myelopathy 


       Recommendation of FB of the MBs L4-L5, L5-S1 #2. Risks, benefit of 

procedure discussed and pt verbalized understanding. Denies anticoagulant use or

medical history of diabetes.


       Chronic and current use of high-risk medication (Opioids).


       The patient was counseled about risk of opioid use, psychological risk 

associated with opioids and was orally counseled to not overuse ,


       divert or sell medications. Pt is to store medication in a safe location.

                     


       The  patient is counseled against driving while using narcotic 

medications and also not to use alcohol or any illicit recreational drugs. 


       Patient verbalized understanding that the lack of compliance will result 

in failure to renew narcotic prescription(s) as well as possible discharge from 

the clinic


       Diagnoses, prognosis and treatment options including but not limited to 

physical therapy, surgical interventions, interventional therapies and 

medication management including narcotics and adjuvant medication were 

discussed.


       All patient questions answered 


       MAPS reviewed and it was appropriate.


       UDS from 3/21/22 reviewed and consistent


       Prescription for Norco 10/325mg #60 w 1 refill.





I have spent 31 minutes on patient care today. Dr Cummins was available by 

phone for the evaluation of this patient. The time was used to review the 

medical records including relevant urine studies and Prescription history 

(MAPs), review of the available imaging, evaluation and examination of the 

patient, coordination of care with the medical staff and if applicable referring

physicians, as well as creation of the medical record


                                                  





PQRS Measure Charge Sheet


Mode of Arrival: Ambulatory, Wheelchair


PQRS Narrative: 


                                        





Narcotic Agreement Date Signed   02/07/22


Blood Pressure                   128/73


Pain Intensity [Bilateral        4


Lower Back]                      


Scale Used                       Numeric (1 - 10)


Hx Alcohol Use (MH)              No








Home Medications: 


Ambulatory Orders





Atorvastatin [Lipitor] 20 mg PO DAILY 06/25/21 


OXcarbazepine [Trileptal] 300 mg PO BID 06/25/21 


Pantoprazole Sodium [Protonix] 40 mg PO BID 06/25/21 


Sucralfate [Carafate] 1 gm PO QID 06/25/21 


traZODone HCL [Desyrel] 100 mg PO HS 06/25/21 


Nicotine 21Mg/24Hr Patch [Habitrol] 1 patch TRANSDERM DAILY  patch 07/07/21 


HYDROcodone/APAP 5-325MG [Norco 5-325] 1 tab PO Q12HR PRN 30 Days #60 tab 

03/21/22 


ALPRAZolam [Xanax] 0.5 mg PO BID 04/29/22 


Albuterol .083%/3ml 1 dose INHALATION AS DIRECTED 04/29/22 


Budesonide [Pulmicort] 0.25 mg INHALATION BID 04/29/22 


Ergocalciferol [Vitamin D2 (1250 Mcg = 04275 Iu)] 1,250 mcg PO WEEKLY 04/29/22 


Famotidine [Pepcid] 40 mg PO DAILY 04/29/22 


Fluconazole [Diflucan] 100 mg PO BID 04/29/22 


Hydrocortisone [Cortef] 10 mg PO W/SUPPER 04/29/22 


Hydrocortisone [Cortef] 20 mg PO QAM 04/29/22 


Ipratropium-Albuterol Nebulize [Duoneb 0.5 mg-3 mg/3 ml Soln] 3 ml INHALATION 

QID 04/29/22 


Omeprazole [PriLOSEC] 40 mg PO BID 04/29/22 


Ondansetron [Zofran] 4 mg PO Q8HR PRN 04/29/22 


metFORMIN  mg PO BID 04/29/22

## 2022-05-02 NOTE — IR
PICC LINE exchange over guidewire:

 

HISTORY:  Infection requiring long-term antibiotic therapy

 

PROCEDURE:  Ultrasound and fluoroscopic guidance of PICC line placement.

 

COMPLICATIONS:  None

 

ANESTHESIA:  1. 1% Lidocaine locally.

 

FINDINGS/TECHNIQUE:  The procedure was explained to the patient.  The risks, complications, benefits 
and alternatives were discussed and any questions were answered.  Informed consent was obtained.  The
 patient was placed supine on the fluoroscopic table and prepped and draped in the usual sterile fash
ion. Pre-existing PICC line was cut and exchanged over an 0.018 guidewire. The vein is patent.  A 5-F
r sheath was placed over the guidewire.  The guidewire and dilator were removed and a 5-F. Double lum
en PICC line was placed through the sheath with the tip at the level of the SVC.  The sheath was freddy
gokul, the catheter was flushed and sutured into position.  The patient was stable throughout the proce
dure and remained stable upon discharge from the Department of Radiology. 

 

The vein puncture was patent under ultrasound. A gray scale image was obtained to document patency of
 the vein punctured.

 

All elements of the maximal barrier technique were utilized.

 

FLUOROSCOPY TIME: 320 minutes

 

IMPRESSION:

1. Successful fluoroscopic guided PICC line exchange over guidewire.

## 2022-05-05 ENCOUNTER — HOSPITAL ENCOUNTER (OUTPATIENT)
Dept: HOSPITAL 47 - RADCTMAIN | Age: 62
Discharge: HOME | End: 2022-05-05
Attending: INTERNAL MEDICINE
Payer: MEDICARE

## 2022-05-05 DIAGNOSIS — C34.92: ICD-10-CM

## 2022-05-05 DIAGNOSIS — Z03.89: Primary | ICD-10-CM

## 2022-05-05 LAB — BUN SERPL-SCNC: 11 MG/DL (ref 9–20)

## 2022-05-05 PROCEDURE — 82565 ASSAY OF CREATININE: CPT

## 2022-05-05 PROCEDURE — 36415 COLL VENOUS BLD VENIPUNCTURE: CPT

## 2022-05-05 PROCEDURE — 84520 ASSAY OF UREA NITROGEN: CPT

## 2022-05-05 PROCEDURE — 71260 CT THORAX DX C+: CPT

## 2022-05-05 PROCEDURE — 74177 CT ABD & PELVIS W/CONTRAST: CPT

## 2022-05-05 NOTE — CT
EXAMINATION TYPE: CT ChestAbdPelvis w con

 

DATE OF EXAM: 5/5/2022

 

COMPARISON: 3/24/2022

 

HISTORY: Lung cancer

 

CT DLP: 1372.90 mGycm

 

CONTRAST: 

CT scan of the chest, abdomen and pelvis is performed with Oral Contrast and with IV Contrast, patien
t injected with 100 mL of Isovue 300.

 

CT  Chest:

LUNGS: Left suprahilar mass which extends to the left upper lobe pleural surface persists and measure
s 5.3 cm in length by 1.7 cm. Prior measurement is 7.0 x 2.7 cm. The mass has decreased in size. Pleu
ral-based nodule posterior aspect of the superior segment left lower lobe measures 9 mm versus 1.3 cm
 previously. No additional pulmonary nodules seen. No new masses present. Scattered subpleural fibros
is. Mild hyperinflation compatible with COPD.

 

 

MEDIASTINUM/HILAR STRUCTURES: Persistent but much improved AP window adenopathy measuring 1.7 cm vers
us 3.6 cm previously. Right paratracheal adenopathy is also much improved and currently measures 1.5 
cm versus 3.1 cm. No new adenopathy appreciated. Pericardial lymph nodes persist however smaller in s
ize.

 

OTHER: No significant abnormality.

 

CONTRAST CT ABDOMEN AND PELVIS FINDINGS: 

 

LIVER/GB:   Hepatic steatosis. No calcified gallstones.  No space occupying hepatic lesion. Biliary t
ree is of normal caliber. 

 

PANCREAS:  No inflammation.  No distinct mass. 

 

SPLEEN:  No splenic enlargement.  No lesion seen. 

 

ADRENALS:  No nodule.  No thickening. 

 

KIDNEYS/BLADDER:  No hydronephrosis.  No nephrolithiasis.  No disctinct renal mass. 

 

BOWEL: Normal appendix.  Normal bowel caliber.  No inflammation. 

 

GENITAL ORGANS:  No gross abnormality. 

 

LYMPH NODES: Upper abdominal adenopathy persists although is smaller in size for example adjacent to 
the left celiac axis there is a 1.2 cm lymph node seen versus 2.0 cm previously. Lymph node adjacent 
to the pancreas seen previously has resolved. No additional adenopathy within the abdomen or pelvis.

 

AORTA: No significant abnormality. 

 

OSSEOUS STRUCTURES:  Osteopenia. Multilevel vertebral body collapse and vertebroplasty. Bilateral tot
al hip arthroplasty causing beam hardening artifacts on the adjacent pelvic structures. 

 

OTHER:  No significant additional abnormality is seen.  

 

IMPRESSION: 

1. Left upper lobe mass persists although has diminished in size. No new masses identified.

2. There is also improving mediastinal and upper abdominal adenopathy.

## 2022-06-03 ENCOUNTER — HOSPITAL ENCOUNTER (OUTPATIENT)
Dept: HOSPITAL 47 - ORPAIN | Age: 62
Discharge: HOME | End: 2022-06-03
Attending: ANESTHESIOLOGY
Payer: MEDICARE

## 2022-06-03 VITALS — TEMPERATURE: 97.5 F

## 2022-06-03 VITALS — SYSTOLIC BLOOD PRESSURE: 103 MMHG | RESPIRATION RATE: 16 BRPM | HEART RATE: 92 BPM | DIASTOLIC BLOOD PRESSURE: 66 MMHG

## 2022-06-03 VITALS — BODY MASS INDEX: 30 KG/M2

## 2022-06-03 DIAGNOSIS — Z85.118: ICD-10-CM

## 2022-06-03 DIAGNOSIS — E11.9: ICD-10-CM

## 2022-06-03 DIAGNOSIS — M51.36: ICD-10-CM

## 2022-06-03 DIAGNOSIS — Z79.51: ICD-10-CM

## 2022-06-03 DIAGNOSIS — Z79.4: ICD-10-CM

## 2022-06-03 DIAGNOSIS — Z79.899: ICD-10-CM

## 2022-06-03 DIAGNOSIS — M47.816: Primary | ICD-10-CM

## 2022-06-03 LAB — GLUCOSE BLD-MCNC: 256 MG/DL (ref 75–99)

## 2022-06-03 PROCEDURE — 64494 INJ PARAVERT F JNT L/S 2 LEV: CPT

## 2022-06-03 PROCEDURE — 64493 INJ PARAVERT F JNT L/S 1 LEV: CPT

## 2022-06-03 NOTE — P.PCN
Date of Procedure: 06/03/22


Procedure(s) Performed: 





PREOPERATIVE DIAGNOSIS   :   1-  Lumbar spondylosis with  Facet Arthropathy 

without myelopathy .  2- Lumber degenerative disc disease





POSTOPERATIVE DIAGNOSIS:   1-  Lumbar spondylosis with  Facet Arthropathy 

without myelopathy .  2- Lumber degenerative disc disease


 


PROCEDURE: Diagnostic  bilateral L3  , L4  , and L5 medial branch block under  

fluoroscopy guidance(fluoroscopy images available in the radiology Department )


                                ( To target the facet joint between bilateral 

L4-5 , and L5-S1 )# 2nd 





ANESTHESIA:, monitered  anesthesia care as per anesthesia department.


EBL: Minimal





COMPLICATION: None


 


PROCEDURE INDICATION: Chronic low back pain secondary to Facet arthropathy 

unresponsive to conservative treatment.  





PROCEDURE DESCRIPTION:  the patient was seen and identified in the preop holding

area , risks and benefits and possible complications of the procedure and 

alternative                                                                     

                                                                                

                                                                                

                                            were discussed with the patient, and

the patient agreed  to proceed with the procedure and signed the consent        

                                                                                

                                                 and vital signs monitored 

during the  procedure and fluoroscopy was used to maximize the benefit and 

accuracy of the needle placement, and sedation was given to decrease patient  

anxiety, patient was taken to the procedure room and placed in prone position 

vital signs monitored in the back prepped with chlorhexidine X3 then under 

strict sterile technique using a right oblique fluoroscopy ,the  junction of the

transverse process and the superior articulating process of the right L3 , L4 , 

and L5  vertebra which corresponding to the fluoroscopy image of the eye of the 

Homer dog on the block side for the medial branches and subsequently , after 

local infiltration of skin and subcu tissuies with Ropivacaine 0.5 %  , one mL  

at  each level ,then  22-gauge Quincke-type needles , 3 needle was used  , each 

one of them placed at the junction of the base of the transverse process and the

superior articular process at the appropriate level, and the needle was advanced

until the periosteum contacted, needle placement confirmed with AP oblique and 

lateral view and after appropriate needle placement confirmed, and after 

negative aspiration for heme and CSF and there was no paresthesia 1-1/2 mL of 

Ropivacaine 0.5% mixed with 20 mg Depo-Medrol , then  half mL injected at each 

level after negative aspiration the needle subsequently removed and the same 

procedure repeated for the left side at left side at  L3 , L4 and L5 levels.


At the end of the procedure and the needles removed and a bandage applied after 

the skin was cleaned the cleaning solution patient taken to recovery room in 

stable condition and monitors in the recovery room for 20-30 minutes and 

discharged home in stable condition after discharge criteria met and patient 

will follow up with the pain clinic in 2-4 weeks

## 2022-06-03 NOTE — FL
Fluoroscopy

 

HISTORY: Pain

 

30 seconds fluoroscopy time supplied to the referring clinician.  4 intraoperative C-arm images docum
ent the procedure. See dictated report from anesthesia.

## 2022-06-09 ENCOUNTER — HOSPITAL ENCOUNTER (INPATIENT)
Dept: HOSPITAL 47 - EC | Age: 62
LOS: 5 days | Discharge: HOME | DRG: 375 | End: 2022-06-14
Attending: FAMILY MEDICINE | Admitting: FAMILY MEDICINE
Payer: MEDICARE

## 2022-06-09 DIAGNOSIS — Z87.39: ICD-10-CM

## 2022-06-09 DIAGNOSIS — G89.3: ICD-10-CM

## 2022-06-09 DIAGNOSIS — Z96.643: ICD-10-CM

## 2022-06-09 DIAGNOSIS — E87.1: ICD-10-CM

## 2022-06-09 DIAGNOSIS — M54.9: ICD-10-CM

## 2022-06-09 DIAGNOSIS — E86.1: ICD-10-CM

## 2022-06-09 DIAGNOSIS — J43.9: ICD-10-CM

## 2022-06-09 DIAGNOSIS — D69.3: ICD-10-CM
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
## 2022-06-09 DIAGNOSIS — C34.12: ICD-10-CM

## 2022-06-09 DIAGNOSIS — F41.9: ICD-10-CM

## 2022-06-09 DIAGNOSIS — Z79.4: ICD-10-CM

## 2022-06-09 DIAGNOSIS — C78.89: Primary | ICD-10-CM

## 2022-06-09 DIAGNOSIS — C77.1: ICD-10-CM

## 2022-06-09 DIAGNOSIS — K29.70: ICD-10-CM

## 2022-06-09 DIAGNOSIS — K21.9: ICD-10-CM

## 2022-06-09 DIAGNOSIS — Z87.891: ICD-10-CM

## 2022-06-09 DIAGNOSIS — Z79.51: ICD-10-CM

## 2022-06-09 DIAGNOSIS — L03.113: ICD-10-CM

## 2022-06-09 DIAGNOSIS — F32.A: ICD-10-CM

## 2022-06-09 DIAGNOSIS — C79.72: ICD-10-CM

## 2022-06-09 DIAGNOSIS — E11.65: ICD-10-CM

## 2022-06-09 DIAGNOSIS — Z79.84: ICD-10-CM

## 2022-06-09 DIAGNOSIS — T80.29XA: ICD-10-CM

## 2022-06-09 DIAGNOSIS — E87.2: ICD-10-CM

## 2022-06-09 DIAGNOSIS — Z98.890: ICD-10-CM

## 2022-06-09 DIAGNOSIS — Z79.899: ICD-10-CM

## 2022-06-09 DIAGNOSIS — K59.00: ICD-10-CM

## 2022-06-09 LAB
ALBUMIN SERPL-MCNC: 3.5 G/DL (ref 3.5–5)
ALP SERPL-CCNC: 94 U/L (ref 38–126)
ALT SERPL-CCNC: 24 U/L (ref 4–49)
AMYLASE SERPL-CCNC: 40 U/L (ref 30–110)
ANION GAP SERPL CALC-SCNC: 10 MMOL/L
AST SERPL-CCNC: 19 U/L (ref 17–59)
BASOPHILS # BLD AUTO: 0 K/UL (ref 0–0.2)
BASOPHILS NFR BLD AUTO: 0 %
BUN SERPL-SCNC: 11 MG/DL (ref 9–20)
CALCIUM SPEC-MCNC: 8.5 MG/DL (ref 8.4–10.2)
CHLORIDE SERPL-SCNC: 97 MMOL/L (ref 98–107)
CO2 SERPL-SCNC: 20 MMOL/L (ref 22–30)
EOSINOPHIL # BLD AUTO: 0.1 K/UL (ref 0–0.7)
EOSINOPHIL NFR BLD AUTO: 1 %
ERYTHROCYTE [DISTWIDTH] IN BLOOD BY AUTOMATED COUNT: 3.07 M/UL (ref 4.3–5.9)
ERYTHROCYTE [DISTWIDTH] IN BLOOD: 18.5 % (ref 11.5–15.5)
GLUCOSE SERPL-MCNC: 412 MG/DL (ref 74–99)
GLUCOSE UR QL: (no result)
HCT VFR BLD AUTO: 32.7 % (ref 39–53)
HGB BLD-MCNC: 10.7 GM/DL (ref 13–17.5)
LIPASE SERPL-CCNC: 86 U/L (ref 23–300)
LYMPHOCYTES # SPEC AUTO: 1.2 K/UL (ref 1–4.8)
LYMPHOCYTES NFR SPEC AUTO: 16 %
MCH RBC QN AUTO: 35.1 PG (ref 25–35)
MCHC RBC AUTO-ENTMCNC: 32.9 G/DL (ref 31–37)
MCV RBC AUTO: 106.6 FL (ref 80–100)
MONOCYTES # BLD AUTO: 0.7 K/UL (ref 0–1)
MONOCYTES NFR BLD AUTO: 9 %
NEUTROPHILS # BLD AUTO: 5.5 K/UL (ref 1.3–7.7)
NEUTROPHILS NFR BLD AUTO: 70 %
PH UR: 6.5 [PH] (ref 5–8)
PLATELET # BLD AUTO: 70 K/UL (ref 150–450)
POTASSIUM SERPL-SCNC: 4 MMOL/L (ref 3.5–5.1)
PROT SERPL-MCNC: 6.4 G/DL (ref 6.3–8.2)
SODIUM SERPL-SCNC: 127 MMOL/L (ref 137–145)
SP GR UR: 1.03 (ref 1–1.03)
UROBILINOGEN UR QL STRIP: <2 MG/DL (ref ?–2)
WBC # BLD AUTO: 7.9 K/UL (ref 3.8–10.6)

## 2022-06-09 PROCEDURE — 85730 THROMBOPLASTIN TIME PARTIAL: CPT

## 2022-06-09 PROCEDURE — 80048 BASIC METABOLIC PNL TOTAL CA: CPT

## 2022-06-09 PROCEDURE — 94640 AIRWAY INHALATION TREATMENT: CPT

## 2022-06-09 PROCEDURE — 87070 CULTURE OTHR SPECIMN AEROBIC: CPT

## 2022-06-09 PROCEDURE — 81003 URINALYSIS AUTO W/O SCOPE: CPT

## 2022-06-09 PROCEDURE — 83540 ASSAY OF IRON: CPT

## 2022-06-09 PROCEDURE — 85610 PROTHROMBIN TIME: CPT

## 2022-06-09 PROCEDURE — 96376 TX/PRO/DX INJ SAME DRUG ADON: CPT

## 2022-06-09 PROCEDURE — 82150 ASSAY OF AMYLASE: CPT

## 2022-06-09 PROCEDURE — 83690 ASSAY OF LIPASE: CPT

## 2022-06-09 PROCEDURE — 96375 TX/PRO/DX INJ NEW DRUG ADDON: CPT

## 2022-06-09 PROCEDURE — 83550 IRON BINDING TEST: CPT

## 2022-06-09 PROCEDURE — 96361 HYDRATE IV INFUSION ADD-ON: CPT

## 2022-06-09 PROCEDURE — 74177 CT ABD & PELVIS W/CONTRAST: CPT

## 2022-06-09 PROCEDURE — 87205 SMEAR GRAM STAIN: CPT

## 2022-06-09 PROCEDURE — 84132 ASSAY OF SERUM POTASSIUM: CPT

## 2022-06-09 PROCEDURE — 80053 COMPREHEN METABOLIC PANEL: CPT

## 2022-06-09 PROCEDURE — 74019 RADEX ABDOMEN 2 VIEWS: CPT

## 2022-06-09 PROCEDURE — 96374 THER/PROPH/DIAG INJ IV PUSH: CPT

## 2022-06-09 PROCEDURE — 83625 ASSAY OF LDH ENZYMES: CPT

## 2022-06-09 PROCEDURE — 82728 ASSAY OF FERRITIN: CPT

## 2022-06-09 PROCEDURE — 87186 SC STD MICRODIL/AGAR DIL: CPT

## 2022-06-09 PROCEDURE — 71045 X-RAY EXAM CHEST 1 VIEW: CPT

## 2022-06-09 PROCEDURE — 85025 COMPLETE CBC W/AUTO DIFF WBC: CPT

## 2022-06-09 PROCEDURE — 83605 ASSAY OF LACTIC ACID: CPT

## 2022-06-09 PROCEDURE — 83735 ASSAY OF MAGNESIUM: CPT

## 2022-06-09 PROCEDURE — 99285 EMERGENCY DEPT VISIT HI MDM: CPT

## 2022-06-09 PROCEDURE — 87077 CULTURE AEROBIC IDENTIFY: CPT

## 2022-06-09 PROCEDURE — 36415 COLL VENOUS BLD VENIPUNCTURE: CPT

## 2022-06-09 RX ADMIN — PANTOPRAZOLE SODIUM SCH MG: 40 INJECTION, POWDER, FOR SOLUTION INTRAVENOUS at 23:32

## 2022-06-09 RX ADMIN — HYDROMORPHONE HYDROCHLORIDE PRN MG: 1 INJECTION, SOLUTION INTRAMUSCULAR; INTRAVENOUS; SUBCUTANEOUS at 09:57

## 2022-06-09 RX ADMIN — HYDROMORPHONE HYDROCHLORIDE PRN MG: 1 INJECTION, SOLUTION INTRAMUSCULAR; INTRAVENOUS; SUBCUTANEOUS at 13:24

## 2022-06-09 RX ADMIN — HYDROMORPHONE HYDROCHLORIDE PRN MG: 1 INJECTION, SOLUTION INTRAMUSCULAR; INTRAVENOUS; SUBCUTANEOUS at 21:16

## 2022-06-09 RX ADMIN — CEFAZOLIN SCH MLS/HR: 330 INJECTION, POWDER, FOR SOLUTION INTRAMUSCULAR; INTRAVENOUS at 23:33

## 2022-06-09 RX ADMIN — HYDROMORPHONE HYDROCHLORIDE PRN MG: 1 INJECTION, SOLUTION INTRAMUSCULAR; INTRAVENOUS; SUBCUTANEOUS at 18:02

## 2022-06-09 RX ADMIN — CEFAZOLIN SCH MLS/HR: 330 INJECTION, POWDER, FOR SOLUTION INTRAMUSCULAR; INTRAVENOUS at 09:56

## 2022-06-09 NOTE — P.CONS
History of Present Illness





- Reason for Consult


Consult date: 06/09/22


SCLC on treatment


Requesting physician: Rick Schroeder





- Chief Complaint


abd pain





- History of Present Illness


Mr Erickson is a very pleasant 62-year-old male patient of Dr. Freire, diagnosed in 

June 2021 with metastatic small cell lung cancer.  Patient has been through 

multiple lines of therapy-summarized below-currently admitted with abdominal 

pain.  Pain is periumbilical, associated with increase in gas, it started with a

rather sudden onset in the last day, persistent, nothing has been helping to 

relieve the pain, he increased his antacids with no relief.  He did have normal 

consistency stool yesterday.  His last treatment was 2 weeks ago he was due for 

day 15 today.  Patient denied fevers, nausea, vomiting, bleeding, cough, 

dysuria, swelling of the legs.














Initially seen in consult [facility]  6/26/21, admitted with progressively 

worsening shortness of breath x 2 weeks.  CXR revealed essentially opacification

of the left hemithorax.  CT scan showed a large mediastinal mass measuring 9.5 

cm with pleural effusion.  Admitted to the ICU, thoracentesis, positive for 

metastatic small cell carcinoma.  CT AP 6/28/21 showed a large left-sided 

pleural effusion, bilateral retroperitoneal nodes largest measuring 1.9 cm, 

periaortic nodes and multiple iliac chain and perirectal lymph nodes, largest 

measuring 1.2 cm.  MRI of the brain on 6/28/21 showed no metastatic disease.  He

had his first chemotherapy in patient.  Symptoms improved, he was transferred to

F from the hospital.  Seen for his first office visit on 7/28/21.


He continued on carboplatin/.  Did very well, completed 6 cycles in November 2021.  CT CAP stable disease no new sites of disease.  The patient was 

subsequently able to get coverage for immunotherapy, and started Tecentriq on 

1/12/22, maintenance. Did well until 3/22.  He was having increased shortness of

breath, CT confirmed progression.  He was started on second line treatment with 

carboplatin and IV irinotecan on 3/17/22.  He is status post 4 cycles.


  





Review of Systems





10 point review systems is negative except as stated in HPI





Past Medical History


Past Medical History: Cancer, COPD, GERD/Reflux


Additional Past Medical History / Comment(s): LUNG CANCER WITH TUMOR IN STOMACH 

AND STATES ALSO IN HIS NECK.,.  RECEIVING CHEMOTHERAPY., DDD WITH BACK ,HIP & 

LEG PAIN., (PAIN CLINIC PATIENT)., EMPHYSEMA., GASTRITIS.


History of Any Multi-Drug Resistant Organisms: None Reported


Past Surgical History: Joint Replacement, Orthopedic Surgery


Additional Past Surgical History / Comment(s): RIGHT AND LEFT TOTAL HIPS,  RIGHT

SHOULDER SURGERY.


Past Anesthesia/Blood Transfusion Reactions: No Reported Reaction


Past Psychological History: Anxiety, Depression


Smoking Status: Former smoker





- Past Family History


  ** Father


Family Medical History: No Reported History





Medications and Allergies


                                Home Medications











 Medication  Instructions  Recorded  Confirmed  Type


 


Atorvastatin [Lipitor] 20 mg PO DAILY 06/25/21 06/09/22 History


 


OXcarbazepine [Trileptal] 300 mg PO BID 06/25/21 06/09/22 History


 


Pantoprazole Sodium [Protonix] 40 mg PO BID 06/25/21 06/09/22 History


 


Sucralfate [Carafate] 1 gm PO QID 06/25/21 06/09/22 History


 


traZODone HCL [Desyrel] 100 mg PO HS 06/25/21 06/09/22 History


 


ALPRAZolam [Xanax] 0.5 mg PO BID 04/29/22 06/09/22 History


 


Budesonide [Pulmicort] 0.25 mg INHALATION RT-BID 04/29/22 06/09/22 History


 


Ergocalciferol [Vitamin D2 (1250 1,250 mcg PO Q7D 04/29/22 06/09/22 History





Mcg = 00184 Iu)]    


 


Famotidine [Pepcid] 40 mg PO DAILY 04/29/22 06/09/22 History


 


Hydrocortisone [Cortef] 10 mg PO W/SUPPER 04/29/22 06/03/22 History


 


Hydrocortisone [Cortef] 20 mg PO QAM 04/29/22 06/03/22 History


 


Ipratropium-Albuterol Nebulize 3 ml INHALATION RT-QID 04/29/22 06/09/22 History





[Duoneb 0.5 mg-3 mg/3 ml Soln]    


 


Omeprazole [PriLOSEC] 40 mg PO BID 04/29/22 06/09/22 History


 


Ondansetron [Zofran] 4 mg PO Q8HR PRN 04/29/22 06/09/22 History


 


metFORMIN  mg PO BID 04/29/22 06/09/22 History


 


HYDROcodone/APAP 10-325MG [Norco 1 tab PO Q12HR PRN 30 Days #60 tab 05/02/22 06/09/22 Rx





]    


 


Albuterol Sulfate [Accuneb] 3 ml INHALATION RT-QID 06/09/22 06/09/22 History


 


Fluconazole [Diflucan] 100 mg PO DAILY PRN 06/09/22 06/09/22 History








                                    Allergies











Allergy/AdvReac Type Severity Reaction Status Date / Time


 


No Known Allergies Allergy   Verified 06/09/22 05:59














Physical Exam


Vitals: 


                                   Vital Signs











  Temp Pulse Resp BP Pulse Ox


 


 06/09/22 18:05   98  18  138/76  95


 


 06/09/22 12:37   107 H  18  132/92  97


 


 06/09/22 09:58   107 H  18  151/83  96


 


 06/09/22 06:17   112 H  20  181/87 


 


 06/09/22 05:54  98.0 F  114 H  18  142/75  96








                                Intake and Output











 06/09/22 06/09/22 06/09/22





 06:59 14:59 22:59


 


Other:   


 


  Weight 78.925 kg  














- Constitutional


General appearance: average body habitus, cooperative, no acute distress





- EENT


Eyes: anicteric sclerae, EOMI


ENT: hearing grossly normal, normal oropharynx





- Neck


Neck: no lymphadenopathy





- Respiratory


Respiratory: bilateral: CTA, diminished





- Cardiovascular


Rhythm: regular


Heart sounds: normal: S1, S2


Abnormal Heart Sounds: no systolic murmur, no diastolic murmur, no rub, no S3 

Gallop, no S4 Gallop, no click, no other


  ** leg


Peripheral Edema: bilateral: None





- Gastrointestinal


General gastrointestinal: no absent bowel sounds, decreased bowel sounds, no 

distended, no hepatomegaly, no hyperactive bowel sounds, no normal bowel sounds,

 no organomegaly, no rigid, no scaphoid, soft, no splenomegaly, no tenderness, 

no umbilical hernia, no ventral hernia





- Integumentary


Integumentary: pale





- Neurologic


Neurologic: CNII-XII intact





- Musculoskeletal


Musculoskeletal: strength equal bilaterally





- Psychiatric


Psychiatric: A&O x's 3, appropriate affect, intact judgment & insight





Results


CBC & Chem 7: 


                                 06/09/22 06:34





                                 06/09/22 06:34


Labs: 


                  Abnormal Lab Results - Last 24 Hours (Table)











  06/09/22 06/09/22 06/09/22 Range/Units





  06:34 06:34 06:34 


 


RBC  3.07 L    (4.30-5.90)  m/uL


 


Hgb  10.7 L    (13.0-17.5)  gm/dL


 


Hct  32.7 L    (39.0-53.0)  %


 


MCV  106.6 H    (80.0-100.0)  fL


 


MCH  35.1 H    (25.0-35.0)  pg


 


RDW  18.5 H    (11.5-15.5)  %


 


Plt Count  70 L    (150-450)  k/uL


 


Macrocytosis  Marked A    


 


Sodium   127 L   (137-145)  mmol/L


 


Chloride   97 L   ()  mmol/L


 


Carbon Dioxide   20 L   (22-30)  mmol/L


 


Creatinine   0.57 L   (0.66-1.25)  mg/dL


 


Glucose   412 H   (74-99)  mg/dL


 


Plasma Lactic Acid Santo    2.2 H*  (0.7-2.0)  mmol/L


 


Urine Glucose (UA)     (Negative)  














  06/09/22 Range/Units





  07:35 


 


RBC   (4.30-5.90)  m/uL


 


Hgb   (13.0-17.5)  gm/dL


 


Hct   (39.0-53.0)  %


 


MCV   (80.0-100.0)  fL


 


MCH   (25.0-35.0)  pg


 


RDW   (11.5-15.5)  %


 


Plt Count   (150-450)  k/uL


 


Macrocytosis   


 


Sodium   (137-145)  mmol/L


 


Chloride   ()  mmol/L


 


Carbon Dioxide   (22-30)  mmol/L


 


Creatinine   (0.66-1.25)  mg/dL


 


Glucose   (74-99)  mg/dL


 


Plasma Lactic Acid Santo   (0.7-2.0)  mmol/L


 


Urine Glucose (UA)  4+ H  (Negative)  











CT scan - abdomen: report reviewed


CT scan - pelvis: report reviewed





Assessment and Plan


(1) Abdominal pain


Current Visit: Yes   Status: Acute   Priority: High   Code(s): R10.9 - 

UNSPECIFIED ABDOMINAL PAIN   SNOMED Code(s): 87251553


   





(2) Hyperglycemia


Current Visit: Yes   Status: Acute   Priority: High   Code(s): R73.9 - 

HYPERGLYCEMIA, UNSPECIFIED   SNOMED Code(s): 24930352


   





(3) Small cell lung cancer


Current Visit: No   Status: Chronic   Priority: Medium   Code(s): C34.90 - 

MALIGNANT NEOPLASM OF UNSP PART OF UNSP BRONCHUS OR LUNG   SNOMED Code(s): 

676322112


   


Plan: 


Dr. Freire reviewed CT of the abdomen and pelvis with patient.  There is an adrenal

 nodule that is enlarged but not felt to be contributing to patient's acute 

situation.  Amylase and lipase WNL.  


Patient has been using a very excessive amount of PPIs, changed to Carafate 

only, before meals and at bedtime.





Consulted Surgeon to evaluate patient, concerns for maybe in early onset 

ischemic bowel?  Pending their evaluation and recommendations.





Patient is actually been doing pretty well on this most recent treatment for 

small cell lung cancer.  Pending recovery from his current situation, treatment 

will be rescheduled.





 attests: I have performed H&P, seen and examined patient, developed 

impression and plan of care.  Discussed with dictator.  Agree with documentation

 dictated as a scribe

## 2022-06-09 NOTE — CT
EXAMINATION TYPE: CT abdomen pelvis w con

 

DATE OF EXAM: 6/9/2022

 

COMPARISON: CT dated 5/5/2022

 

HISTORY: Hx of stomach tumor, lung cancer

 

CT DLP: 1149 mGycm

Automated exposure control for dose reduction was used.

 

TECHNIQUE:  Helical acquisition of images was performed from the lung bases through the pelvis.

 

CONTRAST: 

Performed without Oral Contrast and with IV Contrast, patient injected with 70 ml mL of Isovue 300.

 

FINDINGS: 

 

LUNG BASES: Mild peripheral pulmonary reticulation and subtle fibrotic changes. Pericardiophrenic nod
ules, likely metastatic, measuring up to 16 mm compared to 12 mm previously.

 

LIVER/GB: Suspected hepatic steatosis with this limitation, no definite hepatic focal lesion identifi
ed. Unremarkable gallbladder.

 

PANCREAS: No significant abnormality is seen.

 

SPLEEN: No significant abnormality is seen.

 

ADRENALS: Interval progression of the metastatic nodule adjacent to the left adrenal gland measuring 
3 cm compared to 1.8 cm previously. Unremarkable right adrenal.

 

KIDNEYS: Larger left perinephric nodule along the upper pole of the left kidney measuring 9 mm compar
ed to 6 mm previously. Unremarkable kidneys otherwise.

 

FREE AIR:  No free air is visualized.

 

RETROPERITONEAL ADENOPATHY:  No other pathologically enlarged retroperitoneal lymph nodes.

 

REPRODUCTIVE ORGANS: Obscured by artifacts.

 

URINARY BLADDER:  Obscured by artifacts.

 

PELVIC ADENOPATHY:  No pathologically enlarged pelvic lymph nodes.

 

OSSEOUS STRUCTURES:  Osteopenia. Bilateral total hip arthroplasty. Multilevel lower thoracic and lumb
ar vertebral body collapse, appreciated previously.

 

BOWEL:  The known gastric tumor is not well appreciated by this CT scan. No evidence of gastric obstr
uction. No gross duodenal or small bowel abnormality. No gross colonic mass.

 

OTHER: Extensive arterial atherosclerotic calcifications. Infrarenal abdominal aortic ectasia measuri
ng 2.6 cm. No sizable ascites.

 

IMPRESSION:

The known gastric lesion is not appreciated by this CT scan. No evidence of small or large bowel obst
ruction. Progressive metastatic pericardiophrenic and upper abdominal nodules/lymph nodes as detailed
 above. Other interval changes as described above.

## 2022-06-09 NOTE — ED
Abdominal Pain HPI





- General


Chief Complaint: Abdominal Pain


Stated Complaint: abd pain


Time Seen by Provider: 06/09/22 06:09


Source: patient, RN notes reviewed


Mode of arrival: ambulatory


Limitations: no limitations





- History of Present Illness


Initial Comments: 


62-year-old male presents emergency apartment with chief complaint abdominal 

pain.  Patient states that he's having increasing abdominal last 24 hours.  

Patient states that he feels nauseated having normal bowel movements.  Patient 

does have known metastatic lung cancer to his stomach.  Patient states she is 

scheduled for chemotherapy today.  He is followed by Dr. Freire.  Patient states 

that his never had any pain like this in the past.  Denies any fevers or chills.

 Patient does have mild pressure feeling.  Patient denies any dysuria hematuria.








- Related Data


                                Home Medications











 Medication  Instructions  Recorded  Confirmed


 


Atorvastatin [Lipitor] 20 mg PO DAILY 06/25/21 06/03/22


 


OXcarbazepine [Trileptal] 300 mg PO BID 06/25/21 06/03/22


 


Pantoprazole Sodium [Protonix] 40 mg PO BID 06/25/21 06/03/22


 


Sucralfate [Carafate] 1 gm PO QID 06/25/21 06/03/22


 


traZODone HCL [Desyrel] 100 mg PO HS 06/25/21 06/03/22


 


ALPRAZolam [Xanax] 0.5 mg PO BID 04/29/22 06/03/22


 


Albuterol .083%/3ml 1 dose INHALATION AS DIRECTED 04/29/22 06/03/22


 


Budesonide [Pulmicort] 0.25 mg INHALATION BID 04/29/22 06/03/22


 


Ergocalciferol [Vitamin D2 (1250 1,250 mcg PO WEEKLY 04/29/22 06/03/22





Mcg = 14441 Iu)]   


 


Famotidine [Pepcid] 40 mg PO DAILY 04/29/22 06/03/22


 


Fluconazole [Diflucan] 100 mg PO BID 04/29/22 06/03/22


 


Hydrocortisone [Cortef] 10 mg PO W/SUPPER 04/29/22 06/03/22


 


Hydrocortisone [Cortef] 20 mg PO QAM 04/29/22 06/03/22


 


Ipratropium-Albuterol Nebulize 3 ml INHALATION QID 04/29/22 06/03/22





[Duoneb 0.5 mg-3 mg/3 ml Soln]   


 


Omeprazole [PriLOSEC] 40 mg PO BID 04/29/22 06/03/22


 


Ondansetron [Zofran] 4 mg PO Q8HR PRN 04/29/22 06/03/22


 


metFORMIN  mg PO BID 04/29/22 06/03/22








                                  Previous Rx's











 Medication  Instructions  Recorded


 


Nicotine 21Mg/24Hr Patch [Habitrol] 1 patch TRANSDERM DAILY  patch 07/07/21


 


HYDROcodone/APAP 10-325MG [Norco 1 tab PO Q12HR PRN 30 Days #60 tab 05/02/22





]  











                                    Allergies











Allergy/AdvReac Type Severity Reaction Status Date / Time


 


No Known Allergies Allergy   Verified 06/09/22 05:59














Review of Systems


ROS Statement: 


Those systems with pertinent positive or pertinent negative responses have been 

documented in the HPI.





ROS Other: All systems not noted in ROS Statement are negative.





Past Medical History


Past Medical History: Cancer, COPD, GERD/Reflux


Additional Past Medical History / Comment(s): LUNG CANCER WITH TUMOR IN STOMACH 

AND STATES ALSO IN HIS NECK.,.  RECEIVING CHEMOTHERAPY., DDD WITH BACK ,HIP & 

LEG PAIN., (PAIN CLINIC PATIENT)., EMPHYSEMA., GASTRITIS.


History of Any Multi-Drug Resistant Organisms: None Reported


Past Surgical History: Joint Replacement, Orthopedic Surgery


Additional Past Surgical History / Comment(s): RIGHT AND LEFT TOTAL HIPS,  RIGHT

SHOULDER SURGERY.


Past Anesthesia/Blood Transfusion Reactions: No Reported Reaction


Past Psychological History: Anxiety, Depression


Smoking Status: Former smoker





- Past Family History


  ** Father


Family Medical History: No Reported History





General Exam


Limitations: no limitations


General appearance: alert, in no apparent distress


Head exam: Present: atraumatic, normocephalic, normal inspection


Eye exam: Present: normal appearance, PERRL, EOMI.  Absent: scleral icterus, 

conjunctival injection, periorbital swelling


ENT exam: Present: normal exam, normal oropharynx, mucous membranes moist


Neck exam: Present: normal inspection, full ROM.  Absent: tenderness, me

ningismus, lymphadenopathy


Respiratory exam: Present: normal lung sounds bilaterally.  Absent: respiratory 

distress, wheezes, rales, rhonchi, stridor


Cardiovascular Exam: Present: normal rhythm, tachycardia, normal heart sounds.  

Absent: systolic murmur, diastolic murmur, rubs, gallop, clicks


GI/Abdominal exam: Present: soft, tenderness, normal bowel sounds.  Absent: 

distended, guarding, rebound, rigid


Back exam: Absent: CVA tenderness (R), CVA tenderness (L)





Course





                                   Vital Signs











  06/09/22 06/09/22





  05:54 06:17


 


Temperature 98.0 F 


 


Pulse Rate 114 H 112 H


 


Respiratory 18 20





Rate  


 


Blood Pressure 142/75 181/87


 


O2 Sat by Pulse 96 





Oximetry  














Medical Decision Making





- Medical Decision Making





61-year-old male presented for increased abdominal pain with normal lung cancer 

metastasized.  CT of abdomen pelvis shows no evidence of bowel instruction 

progressive metastatic pericardiophrenic and upper abdominal nodes patient does 

have mild hyponatremic, increase abdominal pain, unable tolerate oral intake.  

Patient be admitted for pain control, IV fluid hydration.





- Lab Data


Result diagrams: 


                                 06/09/22 06:34





                                 06/09/22 06:34





                                   Lab Results











  06/09/22 06/09/22 06/09/22 Range/Units





  06:34 06:34 06:34 


 


WBC  7.9    (3.8-10.6)  k/uL


 


RBC  3.07 L    (4.30-5.90)  m/uL


 


Hgb  10.7 L    (13.0-17.5)  gm/dL


 


Hct  32.7 L    (39.0-53.0)  %


 


MCV  106.6 H    (80.0-100.0)  fL


 


MCH  35.1 H    (25.0-35.0)  pg


 


MCHC  32.9    (31.0-37.0)  g/dL


 


RDW  18.5 H    (11.5-15.5)  %


 


Plt Count  70 L    (150-450)  k/uL


 


MPV  8.2    


 


Neutrophils %  70    %


 


Lymphocytes %  16    %


 


Monocytes %  9    %


 


Eosinophils %  1    %


 


Basophils %  0    %


 


Neutrophils #  5.5    (1.3-7.7)  k/uL


 


Lymphocytes #  1.2    (1.0-4.8)  k/uL


 


Monocytes #  0.7    (0-1.0)  k/uL


 


Eosinophils #  0.1    (0-0.7)  k/uL


 


Basophils #  0.0    (0-0.2)  k/uL


 


Poikilocytosis  Slight    


 


Anisocytosis  Slight    


 


Macrocytosis  Marked A    


 


Sodium   127 L   (137-145)  mmol/L


 


Potassium   4.0   (3.5-5.1)  mmol/L


 


Chloride   97 L   ()  mmol/L


 


Carbon Dioxide   20 L   (22-30)  mmol/L


 


Anion Gap   10   mmol/L


 


BUN   11   (9-20)  mg/dL


 


Creatinine   0.57 L   (0.66-1.25)  mg/dL


 


Est GFR (CKD-EPI)AfAm   >90   (>60 ml/min/1.73 sqM)  


 


Est GFR (CKD-EPI)NonAf   >90   (>60 ml/min/1.73 sqM)  


 


Glucose   412 H   (74-99)  mg/dL


 


Plasma Lactic Acid [place]    2.2 H*  (0.7-2.0)  mmol/L


 


Calcium   8.5   (8.4-10.2)  mg/dL


 


Total Bilirubin   0.4   (0.2-1.3)  mg/dL


 


AST   19   (17-59)  U/L


 


ALT   24   (4-49)  U/L


 


Alkaline Phosphatase   94   ()  U/L


 


Total Protein   6.4   (6.3-8.2)  g/dL


 


Albumin   3.5   (3.5-5.0)  g/dL


 


Amylase   40   ()  U/L


 


Lipase   86   ()  U/L


 


Urine Color     


 


Urine Appearance     (Clear)  


 


Urine pH     (5.0-8.0)  


 


Ur Specific Gravity     (1.001-1.035)  


 


Urine Protein     (Negative)  


 


Urine Glucose (UA)     (Negative)  


 


Urine Ketones     (Negative)  


 


Urine Blood     (Negative)  


 


Urine Nitrite     (Negative)  


 


Urine Bilirubin     (Negative)  


 


Urine Urobilinogen     (<2.0)  mg/dL


 


Ur Leukocyte Esterase     (Negative)  














  06/09/22 Range/Units





  07:35 


 


WBC   (3.8-10.6)  k/uL


 


RBC   (4.30-5.90)  m/uL


 


Hgb   (13.0-17.5)  gm/dL


 


Hct   (39.0-53.0)  %


 


MCV   (80.0-100.0)  fL


 


MCH   (25.0-35.0)  pg


 


MCHC   (31.0-37.0)  g/dL


 


RDW   (11.5-15.5)  %


 


Plt Count   (150-450)  k/uL


 


MPV   


 


Neutrophils %   %


 


Lymphocytes %   %


 


Monocytes %   %


 


Eosinophils %   %


 


Basophils %   %


 


Neutrophils #   (1.3-7.7)  k/uL


 


Lymphocytes #   (1.0-4.8)  k/uL


 


Monocytes #   (0-1.0)  k/uL


 


Eosinophils #   (0-0.7)  k/uL


 


Basophils #   (0-0.2)  k/uL


 


Poikilocytosis   


 


Anisocytosis   


 


Macrocytosis   


 


Sodium   (137-145)  mmol/L


 


Potassium   (3.5-5.1)  mmol/L


 


Chloride   ()  mmol/L


 


Carbon Dioxide   (22-30)  mmol/L


 


Anion Gap   mmol/L


 


BUN   (9-20)  mg/dL


 


Creatinine   (0.66-1.25)  mg/dL


 


Est GFR (CKD-EPI)AfAm   (>60 ml/min/1.73 sqM)  


 


Est GFR (CKD-EPI)NonAf   (>60 ml/min/1.73 sqM)  


 


Glucose   (74-99)  mg/dL


 


Plasma Lactic Acid [place]   (0.7-2.0)  mmol/L


 


Calcium   (8.4-10.2)  mg/dL


 


Total Bilirubin   (0.2-1.3)  mg/dL


 


AST   (17-59)  U/L


 


ALT   (4-49)  U/L


 


Alkaline Phosphatase   ()  U/L


 


Total Protein   (6.3-8.2)  g/dL


 


Albumin   (3.5-5.0)  g/dL


 


Amylase   ()  U/L


 


Lipase   ()  U/L


 


Urine Color  Light Yellow  


 


Urine Appearance  Clear  (Clear)  


 


Urine pH  6.5  (5.0-8.0)  


 


Ur Specific Gravity  1.031  (1.001-1.035)  


 


Urine Protein  Negative  (Negative)  


 


Urine Glucose (UA)  4+ H  (Negative)  


 


Urine Ketones  Negative  (Negative)  


 


Urine Blood  Negative  (Negative)  


 


Urine Nitrite  Negative  (Negative)  


 


Urine Bilirubin  Negative  (Negative)  


 


Urine Urobilinogen  <2.0  (<2.0)  mg/dL


 


Ur Leukocyte Esterase  Negative  (Negative)  














Disposition


Clinical Impression: 


 Metastatic lung cancer (metastasis from lung to other site), Hyponatremia, 

Abdominal pain, Hyperglycemia





Disposition: ADMITTED AS IP TO THIS [facility]


Condition: Fair


Referrals: 


Rosa Ortiz DO [Primary Care Provider] - 1-2 days


Time of Disposition: 09:08

## 2022-06-10 LAB
ALBUMIN SERPL-MCNC: 3.4 G/DL (ref 3.8–4.9)
ALBUMIN/GLOB SERPL: 1.45 G/DL (ref 1.6–3.17)
ALP SERPL-CCNC: 81 U/L (ref 41–126)
ALT SERPL-CCNC: 21 U/L (ref 10–49)
AMYLASE SERPL-CCNC: 12 U/L (ref 23–121)
ANION GAP SERPL CALC-SCNC: 11.6 MMOL/L (ref 10–18)
APTT BLD: 26.5 SEC (ref 22–30)
AST SERPL-CCNC: 16 U/L (ref 14–35)
BASOPHILS # BLD AUTO: 0.01 X 10*3/UL (ref 0–0.1)
BASOPHILS NFR BLD AUTO: 0.1 %
BUN SERPL-SCNC: 8 MG/DL (ref 9–27)
BUN/CREAT SERPL: 15.31 RATIO (ref 12–20)
CALCIUM SPEC-MCNC: 8.4 MG/DL (ref 8.7–10.3)
CHLORIDE SERPL-SCNC: 96 MMOL/L (ref 96–109)
CO2 SERPL-SCNC: 23.1 MMOL/L (ref 20–27.5)
EOSINOPHIL # BLD AUTO: 0.06 X 10*3/UL (ref 0.04–0.35)
EOSINOPHIL NFR BLD AUTO: 0.7 %
ERYTHROCYTE [DISTWIDTH] IN BLOOD BY AUTOMATED COUNT: 2.65 X 10*6/UL (ref 4.4–5.6)
ERYTHROCYTE [DISTWIDTH] IN BLOOD: 18.7 % (ref 11.5–14.5)
FERRITIN SERPL-MCNC: 422 NG/ML (ref 22–322)
GLOBULIN SER CALC-MCNC: 2.4 G/DL (ref 1.6–3.3)
GLUCOSE BLD-MCNC: 121 MG/DL (ref 75–99)
GLUCOSE BLD-MCNC: 131 MG/DL (ref 75–99)
GLUCOSE BLD-MCNC: 155 MG/DL (ref 75–99)
GLUCOSE BLD-MCNC: 183 MG/DL (ref 75–99)
GLUCOSE BLD-MCNC: 184 MG/DL (ref 75–99)
GLUCOSE SERPL-MCNC: 168 MG/DL (ref 70–110)
GLUCOSE UR QL: (no result)
HCT VFR BLD AUTO: 27.2 % (ref 39.6–50)
HGB BLD-MCNC: 9.5 G/DL (ref 13–17)
IMM GRANULOCYTES BLD QL AUTO: 0.5 %
INR PPP: 1 (ref ?–1.2)
IRON SERPL-MCNC: 34 UG/DL (ref 65–175)
KETONES UR QL STRIP.AUTO: (no result)
LYMPHOCYTES # SPEC AUTO: 0.89 X 10*3/UL (ref 0.9–5)
LYMPHOCYTES NFR SPEC AUTO: 10.8 %
MAGNESIUM SPEC-SCNC: 1.6 MG/DL (ref 1.5–2.4)
MCH RBC QN AUTO: 35.8 PG (ref 27–32)
MCHC RBC AUTO-ENTMCNC: 34.9 G/DL (ref 32–37)
MCV RBC AUTO: 102.6 FL (ref 80–97)
MONOCYTES # BLD AUTO: 1.21 X 10*3/UL (ref 0.2–1)
MONOCYTES NFR BLD AUTO: 14.6 %
NEUTROPHILS # BLD AUTO: 6.06 X 10*3/UL (ref 1.8–7.7)
NEUTROPHILS NFR BLD AUTO: 73.3 %
NRBC BLD AUTO-RTO: 0 /100 WBCS (ref 0–0)
PH UR: 6 [PH] (ref 5–8)
PLATELET # BLD AUTO: 71 X 10*3/UL (ref 140–440)
PLATELETS.RETICULATED NFR BLD AUTO: 4.4 % (ref 1.1–6.1)
POTASSIUM SERPL-SCNC: 3.3 MMOL/L (ref 3.5–5.5)
PROT SERPL-MCNC: 5.8 G/DL (ref 6.2–8.2)
PT BLD: 10.6 SEC (ref 9–12)
SODIUM SERPL-SCNC: 131 MMOL/L (ref 135–145)
SP GR UR: 1.02 (ref 1–1.03)
TIBC SERPL-MCNC: 213 UG/DL (ref 228–460)
UROBILINOGEN UR QL STRIP: <2 MG/DL (ref ?–2)
WBC # BLD AUTO: 8.27 X 10*3/UL (ref 4.5–10)

## 2022-06-10 RX ADMIN — CEFAZOLIN SCH MLS/HR: 330 INJECTION, POWDER, FOR SOLUTION INTRAMUSCULAR; INTRAVENOUS at 03:04

## 2022-06-10 RX ADMIN — SUCRALFATE SCH GM: 1 TABLET ORAL at 08:50

## 2022-06-10 RX ADMIN — ATORVASTATIN CALCIUM SCH MG: 20 TABLET, FILM COATED ORAL at 08:50

## 2022-06-10 RX ADMIN — DOCUSATE SODIUM AND SENNOSIDES SCH EACH: 50; 8.6 TABLET ORAL at 20:34

## 2022-06-10 RX ADMIN — HYDROMORPHONE HYDROCHLORIDE PRN MG: 1 INJECTION, SOLUTION INTRAMUSCULAR; INTRAVENOUS; SUBCUTANEOUS at 18:05

## 2022-06-10 RX ADMIN — PANTOPRAZOLE SODIUM SCH MG: 40 INJECTION, POWDER, FOR SOLUTION INTRAVENOUS at 08:50

## 2022-06-10 RX ADMIN — INSULIN ASPART SCH UNIT: 100 INJECTION, SOLUTION INTRAVENOUS; SUBCUTANEOUS at 12:36

## 2022-06-10 RX ADMIN — HYDROMORPHONE HYDROCHLORIDE PRN MG: 1 INJECTION, SOLUTION INTRAMUSCULAR; INTRAVENOUS; SUBCUTANEOUS at 15:41

## 2022-06-10 RX ADMIN — HYDROMORPHONE HYDROCHLORIDE PRN MG: 1 INJECTION, SOLUTION INTRAMUSCULAR; INTRAVENOUS; SUBCUTANEOUS at 03:02

## 2022-06-10 RX ADMIN — INSULIN ASPART SCH UNIT: 100 INJECTION, SOLUTION INTRAVENOUS; SUBCUTANEOUS at 08:50

## 2022-06-10 RX ADMIN — SUCRALFATE SCH GM: 1 TABLET ORAL at 12:36

## 2022-06-10 RX ADMIN — BUDESONIDE SCH MG: 0.25 SUSPENSION RESPIRATORY (INHALATION) at 08:13

## 2022-06-10 RX ADMIN — BUDESONIDE SCH MG: 0.25 SUSPENSION RESPIRATORY (INHALATION) at 19:48

## 2022-06-10 RX ADMIN — SUCRALFATE SCH GM: 1 TABLET ORAL at 20:40

## 2022-06-10 RX ADMIN — ENOXAPARIN SODIUM SCH MG: 40 INJECTION SUBCUTANEOUS at 08:49

## 2022-06-10 RX ADMIN — INSULIN ASPART SCH UNIT: 100 INJECTION, SOLUTION INTRAVENOUS; SUBCUTANEOUS at 18:04

## 2022-06-10 RX ADMIN — INSULIN ASPART SCH: 100 INJECTION, SOLUTION INTRAVENOUS; SUBCUTANEOUS at 20:32

## 2022-06-10 RX ADMIN — HYDROMORPHONE HYDROCHLORIDE PRN MG: 1 INJECTION, SOLUTION INTRAMUSCULAR; INTRAVENOUS; SUBCUTANEOUS at 07:19

## 2022-06-10 RX ADMIN — PANTOPRAZOLE SODIUM SCH MG: 40 INJECTION, POWDER, FOR SOLUTION INTRAVENOUS at 20:34

## 2022-06-10 RX ADMIN — HYDROMORPHONE HYDROCHLORIDE PRN MG: 1 INJECTION, SOLUTION INTRAMUSCULAR; INTRAVENOUS; SUBCUTANEOUS at 20:41

## 2022-06-10 RX ADMIN — SUCRALFATE SCH GM: 1 TABLET ORAL at 17:43

## 2022-06-10 RX ADMIN — HYDROMORPHONE HYDROCHLORIDE PRN MG: 1 INJECTION, SOLUTION INTRAMUSCULAR; INTRAVENOUS; SUBCUTANEOUS at 10:36

## 2022-06-10 RX ADMIN — HYDROMORPHONE HYDROCHLORIDE PRN MG: 1 INJECTION, SOLUTION INTRAMUSCULAR; INTRAVENOUS; SUBCUTANEOUS at 23:28

## 2022-06-10 RX ADMIN — HYDROMORPHONE HYDROCHLORIDE PRN MG: 1 INJECTION, SOLUTION INTRAMUSCULAR; INTRAVENOUS; SUBCUTANEOUS at 13:09

## 2022-06-10 NOTE — P.PN
Subjective


Progress Note Date: 06/10/22





 62-year-old male metastatic Lung cancer, COPD, GERD, diabetes type 2 non 

insulin-dependent, anxiety/depression and previous history of smoking presents 

to ER with complaints of abdominal pain mainly in the mid abdomen and bloating 

and distention.  Patient has been having symptoms since the yesterday.  However 

his patient did have a small bowel movement yesterday.  Denies any nausea or 

vomiting.  No fever no chills.  No cough or sputum.  No chest pain or shortness 

of breath.  CT of the abdomen pelvis CT abdomen pelvis showed known gastric 

lesion is not appreciated by the CT scan.  No evidence of small or large bowel 

obstruction.  Progressive metastatic pericardiophrenic and upper abdominal 

nodules and lymph nodes present.  Interval progression of the metastatic nodule 

adjacent to the left adrenal gland measuring 3 cm compared to 1.8 cm 

previously.Patient is currently on chemotherapy and last dose was 2 weeks ago.  

Patient is supposed to get chemotherapy today.





Laboratory data showed WBC 7.9 hemoglobin 10.7 and platelets 70


Sodium 127 potassium 4.0 chloride 97 bicarb is 20 BUN 11 and creatinine 0.57 and

lactic acid 2.2 blood sugars 412





Objective





- Vital Signs


Vital signs: 


                                   Vital Signs











Temp  97.9 F   06/10/22 12:17


 


Pulse  106 H  06/10/22 12:17


 


Resp  13   06/10/22 12:17


 


BP  132/70   06/10/22 12:17


 


Pulse Ox  95   06/10/22 12:17


 


FiO2      








                                 Intake & Output











 06/09/22 06/10/22 06/10/22





 18:59 06:59 18:59


 


Intake Total  240 


 


Balance  240 


 


Weight 78.925 kg  


 


Intake:   


 


  Oral  240 


 


Other:   


 


  Voiding Method  Toilet Toilet


 


  # Voids  1 














- Exam





Patient is lying in the bed comfortably, no acute distress, awake alert and 

oriented.. 


HEENT: Normocephalic. Neck is supple. Pupils reactive. Nostrils clear. Oral 

cavity is moist. 


Neck reveals no JVD, carotid bruits, or thyromegaly. 


CHEST EXAMINATION: Trachea is central. Symmetrical expansion. Lung fields clear 

to auscultation and percussion. 


CARDIAC: Normal S1, S2 with no gallops. No murmurs 


ABDOMEN: Soft. Bowel sounds present.  Nontender.  No organomegaly. No abdominal 

bruits. 


Extremities: reveal no edema.  No clubbing or cyanosis


Neurologically awake, alert, oriented x3 with well-coordinated movements.  No 

focal deficits noted


Skin: No rash or skin lesions. 





- Labs


CBC & Chem 7: 


                                 06/10/22 07:41





                                 06/10/22 07:41


Labs: 


                  Abnormal Lab Results - Last 24 Hours (Table)











  06/10/22 06/10/22 06/10/22 Range/Units





  01:35 02:23 07:37 


 


RBC     (4.40-5.60)  X 10*6/uL


 


Hgb     (13.0-17.0)  g/dL


 


Hct     (39.6-50.0)  %


 


MCV     (80.0-97.0)  fL


 


MCH     (27.0-32.0)  pg


 


RDW     (11.5-14.5)  %


 


Plt Count     (140-440)  X 10*3/uL


 


Plt Count Comment     


 


Lymphocytes #     (0.90-5.00)  X 10*3/uL


 


Monocytes #     (0.20-1.00)  X 10*3/uL


 


Sodium     (135-145)  mmol/L


 


Potassium     (3.5-5.5)  mmol/L


 


BUN     (9.0-27.0)  mg/dL


 


Creatinine     (0.6-1.5)  mg/dL


 


Glucose     ()  mg/dL


 


POC Glucose (mg/dL)   184 H  183 H  (75-99)  mg/dL


 


Calcium     (8.7-10.3)  mg/dL


 


Iron     ()  ug/dL


 


TIBC     (228-460)  ug/dL


 


Transferrin     (204.0-354.0)  mg/dL


 


Ferritin     (22.0-322.0)  ng/mL


 


Total Protein     (6.2-8.2)  g/dL


 


Albumin     (3.8-4.9)  g/dL


 


Albumin/Globulin Ratio     (1.60-3.17)  g/dL


 


Amylase     ()  U/L


 


Urine Protein  Trace H    (Negative)  


 


Urine Glucose (UA)  3+ H    (Negative)  


 


Urine Ketones  3+ H    (Negative)  














  06/10/22 06/10/22 06/10/22 Range/Units





  07:41 07:41 11:10 


 


RBC  2.65 L    (4.40-5.60)  X 10*6/uL


 


Hgb  9.5 L    (13.0-17.0)  g/dL


 


Hct  27.2 L    (39.6-50.0)  %


 


MCV  102.6 H    (80.0-97.0)  fL


 


MCH  35.8 H    (27.0-32.0)  pg


 


RDW  18.7 H    (11.5-14.5)  %


 


Plt Count  71 L    (140-440)  X 10*3/uL


 


Plt Count Comment  DECREASED A    


 


Lymphocytes #  0.89 L    (0.90-5.00)  X 10*3/uL


 


Monocytes #  1.21 H    (0.20-1.00)  X 10*3/uL


 


Sodium   131 L   (135-145)  mmol/L


 


Potassium   3.3 L   (3.5-5.5)  mmol/L


 


BUN   8.0 L   (9.0-27.0)  mg/dL


 


Creatinine   0.5 L   (0.6-1.5)  mg/dL


 


Glucose   168 H   ()  mg/dL


 


POC Glucose (mg/dL)    155 H  (75-99)  mg/dL


 


Calcium   8.4 L   (8.7-10.3)  mg/dL


 


Iron   34 L   ()  ug/dL


 


TIBC   213 L   (228-460)  ug/dL


 


Transferrin   152.0 L   (204.0-354.0)  mg/dL


 


Ferritin   422.0 H   (22.0-322.0)  ng/mL


 


Total Protein   5.8 L   (6.2-8.2)  g/dL


 


Albumin   3.4 L   (3.8-4.9)  g/dL


 


Albumin/Globulin Ratio   1.45 L   (1.60-3.17)  g/dL


 


Amylase   12 L   ()  U/L


 


Urine Protein     (Negative)  


 


Urine Glucose (UA)     (Negative)  


 


Urine Ketones     (Negative)  














Assessment and Plan


Assessment: 





Abdominal pain likely due to interval progression of the metastatic nodules.


New metastatic nodular lesion to the left adrenal gland unlikely the cause of 

pain.


Lactic acidosis


Hyperglycemia with uncontrolled diabetes type 2 non-insulin-dependent


Hypovolemic hyponatremia/pseudohyponatremia


Thrombocytopenia


COPD not in exacerbation


GERD


Anxiety/depression


Previous history of smoking





Plan:


Patient will be current on IV hydration and pain management with Dilaudid IV.  

Continue with insulin sliding scale and will add long-acting insulin as needed.


Patient is also on hydrocortisone at home.  Oncology was consulted and follow-up

closely.  Continue with supportive care.  Prognosis is guarded at this time.

## 2022-06-10 NOTE — P.GSCN
History of Present Illness


Consult date: 06/10/22


History of present illness: 





CHIEF COMPLAINT: Abdominal pain





HISTORY OF PRESENT ILLNESS: This is a 62-year-old male who presented to the 

hospital with complaints of abdominal pain for the last 4 days.  He reports the 

pain is diffuse pain and describes it as sharp.  At times it was 10 out of 10 

pain.  He also has been having nausea no vomiting.  Denies any fever chills or 

sweats.  He does have a history of metastatic lung cancer to the stomach.  He 

had received chemotherapy in June last year and he was scheduled to start 

chemotherapy yesterday.  Patient has been having low-grade temps of a heart 0.2 

last night and mild tachycardic.  White count was normal and he did have a 

lactic acid elevated on admission of 2.2.  Patient reports that his bowel 

movements have been normal.  He denies any prior abdominal surgeries.  He 

reports upset stomach after eating and a decreased appetite.  His last EGD was 

in June of last year which was when the stomach cancer was diagnosed per 

patient. Last colonoscopy was greater than 10 years ago.  Patient denies any 

cardiac history.  Denies being on any blood thinners.  Patient reports 

improvement in abdominal pain with pain medication.





PAST MEDICAL HISTORY: 


See list.





PAST SURGICAL HISTORY: 


See list.





MEDICATIONS: 


See list.





ALLERGIES: 


See list.





SOCIAL HISTORY: No illicit drug use.  





REVIEW OF SYSTEMS: 


CONSTITUTIONAL: Denies fever or chills.


HEENT: Denies blurred vision, vision changes, or eye pain. Denies hemoptysis 


CARDIOVASCULAR: Denies chest pain or pressure.


RESPIRATORY: No shortness of breath. 


GASTROINTESTINAL: See HPI for pertinent findings


HEMATOLOGIC: Denies bleeding disorders.


GENITOURINARY:  Denies any blood in urine or increased urinary frequency.  


SKIN: Denies pruitis. Denies rash.





PHYSICAL EXAM: 


VITAL SIGNS: Reviewed


GENERAL: Well-developed in no acute distress. 


HEENT:  No sclera icterus. Extraocular movements grossly intact.  Moist buccal 

mucosa. Head is atraumatic, normocephalic. No nasal drainage.


ABDOMEN:  Soft.  Nondistended.  Nontender


NEUROLOGIC:  Alert and oriented.  Cranial nerves II through XII grossly intact.





LABORATORY DATA:


WBC 7.9 hemoglobin 9.5 platelets 71


INR 1.0


Sodium 131 potassium 3.3 creatinine 0.5 glucose 168 iron 34 LFTs normal lipase 

86 urinalysis and negative for infection





IMAGING:


Abdominal x-ray nonspecific abdomen correlate constipation


Computed tomography scan abdomen and pelvis with IV contrast the known gastric 

lesion is not appreciated by the computed tomography scan.  No evidence of small

or large bowel obstruction.  Progressive metastatic pericardiophrenic and upper 

abdominal nodules/lymph nodes.





ASSESSMENT: 


1.  Abdominal pain


2.  Constipation


3.  Lung cancer with metastatic disease and evidence of progressive lymph nodes 

of the abdomen on CAT scan


4.  Lactic acidosis


5.  Hyponatremia





PLAN: 


-Further recommendations forthcoming per surgeon


-Agree with good bowel regimen to treat constipation


-Continue IV fluids


-Continue clear liquid diet





Thank you for this consultation





Physician Assistant note has been reviewed by physician. Signing provider agrees

with the documented findings, assessment, and plan of care. 





Past Medical History


Past Medical History: Cancer, COPD, GERD/Reflux


Additional Past Medical History / Comment(s): LUNG CANCER WITH TUMOR IN STOMACH 

AND STATES ALSO IN HIS NECK.,.  RECEIVING CHEMOTHERAPY., DDD WITH BACK ,HIP & 

LEG PAIN., (PAIN CLINIC PATIENT)., EMPHYSEMA., GASTRITIS.


History of Any Multi-Drug Resistant Organisms: None Reported


Past Surgical History: Joint Replacement, Orthopedic Surgery


Additional Past Surgical History / Comment(s): RIGHT AND LEFT TOTAL HIPS,  RIGHT

SHOULDER SURGERY.


Past Anesthesia/Blood Transfusion Reactions: No Reported Reaction


Past Psychological History: Anxiety, Depression


Smoking Status: Former smoker





- Past Family History


  ** Father


Family Medical History: No Reported History





Medications and Allergies


                                Home Medications











 Medication  Instructions  Recorded  Confirmed  Type


 


Atorvastatin [Lipitor] 20 mg PO DAILY 06/25/21 06/09/22 History


 


OXcarbazepine [Trileptal] 300 mg PO BID 06/25/21 06/09/22 History


 


Pantoprazole Sodium [Protonix] 40 mg PO BID 06/25/21 06/09/22 History


 


Sucralfate [Carafate] 1 gm PO QID 06/25/21 06/09/22 History


 


traZODone HCL [Desyrel] 100 mg PO HS 06/25/21 06/09/22 History


 


ALPRAZolam [Xanax] 0.5 mg PO BID 04/29/22 06/09/22 History


 


Budesonide [Pulmicort] 0.25 mg INHALATION RT-BID 04/29/22 06/09/22 History


 


Ergocalciferol [Vitamin D2 (1250 1,250 mcg PO Q7D 04/29/22 06/09/22 History





Mcg = 00985 Iu)]    


 


Famotidine [Pepcid] 40 mg PO DAILY 04/29/22 06/09/22 History


 


Hydrocortisone [Cortef] 10 mg PO W/SUPPER 04/29/22 06/10/22 History


 


Hydrocortisone [Cortef] 20 mg PO DAILY 04/29/22 06/10/22 History


 


Ipratropium-Albuterol Nebulize 3 ml INHALATION RT-QID 04/29/22 06/09/22 History





[Duoneb 0.5 mg-3 mg/3 ml Soln]    


 


Omeprazole [PriLOSEC] 40 mg PO BID 04/29/22 06/09/22 History


 


Ondansetron [Zofran] 4 mg PO Q8HR PRN 04/29/22 06/09/22 History


 


metFORMIN  mg PO BID 04/29/22 06/09/22 History


 


HYDROcodone/APAP 10-325MG [Norco 1 tab PO Q12HR PRN 30 Days #60 tab 05/02/22 06/09/22 Rx





]    


 


Albuterol Nebulized [Ventolin 2.5 mg INHALATION RT-QID 06/09/22 06/09/22 History





Nebulized]    


 


Fluconazole [Diflucan] 100 mg PO DAILY PRN 06/09/22 06/09/22 History


 


Insulin Aspart [NovoLOG Flexpen] See Protocol SQ ACHS 06/10/22 06/10/22 History








                                    Allergies











Allergy/AdvReac Type Severity Reaction Status Date / Time


 


No Known Allergies Allergy   Verified 06/09/22 05:59














Surgical - Exam


                                   Vital Signs











Temp Pulse Resp BP Pulse Ox


 


 98.0 F   114 H  18   142/75   96 


 


 06/09/22 05:54  06/09/22 05:54  06/09/22 05:54  06/09/22 05:54  06/09/22 05:54














Results





- Labs





                                 06/10/22 07:41





                                 06/10/22 07:41


                  Abnormal Lab Results - Last 24 Hours (Table)











  06/10/22 06/10/22 06/10/22 Range/Units





  01:35 02:23 07:37 


 


RBC     (4.40-5.60)  X 10*6/uL


 


Hgb     (13.0-17.0)  g/dL


 


Hct     (39.6-50.0)  %


 


MCV     (80.0-97.0)  fL


 


MCH     (27.0-32.0)  pg


 


RDW     (11.5-14.5)  %


 


Plt Count     (140-440)  X 10*3/uL


 


Plt Count Comment     


 


Lymphocytes #     (0.90-5.00)  X 10*3/uL


 


Monocytes #     (0.20-1.00)  X 10*3/uL


 


Sodium     (135-145)  mmol/L


 


Potassium     (3.5-5.5)  mmol/L


 


BUN     (9.0-27.0)  mg/dL


 


Creatinine     (0.6-1.5)  mg/dL


 


Glucose     ()  mg/dL


 


POC Glucose (mg/dL)   184 H  183 H  (75-99)  mg/dL


 


Calcium     (8.7-10.3)  mg/dL


 


Iron     ()  ug/dL


 


TIBC     (228-460)  ug/dL


 


Transferrin     (204.0-354.0)  mg/dL


 


Ferritin     (22.0-322.0)  ng/mL


 


Total Protein     (6.2-8.2)  g/dL


 


Albumin     (3.8-4.9)  g/dL


 


Albumin/Globulin Ratio     (1.60-3.17)  g/dL


 


Amylase     ()  U/L


 


Urine Protein  Trace H    (Negative)  


 


Urine Glucose (UA)  3+ H    (Negative)  


 


Urine Ketones  3+ H    (Negative)  














  06/10/22 06/10/22 06/10/22 Range/Units





  07:41 07:41 11:10 


 


RBC  2.65 L    (4.40-5.60)  X 10*6/uL


 


Hgb  9.5 L    (13.0-17.0)  g/dL


 


Hct  27.2 L    (39.6-50.0)  %


 


MCV  102.6 H    (80.0-97.0)  fL


 


MCH  35.8 H    (27.0-32.0)  pg


 


RDW  18.7 H    (11.5-14.5)  %


 


Plt Count  71 L    (140-440)  X 10*3/uL


 


Plt Count Comment  DECREASED A    


 


Lymphocytes #  0.89 L    (0.90-5.00)  X 10*3/uL


 


Monocytes #  1.21 H    (0.20-1.00)  X 10*3/uL


 


Sodium   131 L   (135-145)  mmol/L


 


Potassium   3.3 L   (3.5-5.5)  mmol/L


 


BUN   8.0 L   (9.0-27.0)  mg/dL


 


Creatinine   0.5 L   (0.6-1.5)  mg/dL


 


Glucose   168 H   ()  mg/dL


 


POC Glucose (mg/dL)    155 H  (75-99)  mg/dL


 


Calcium   8.4 L   (8.7-10.3)  mg/dL


 


Iron   34 L   ()  ug/dL


 


TIBC   213 L   (228-460)  ug/dL


 


Transferrin   152.0 L   (204.0-354.0)  mg/dL


 


Ferritin   422.0 H   (22.0-322.0)  ng/mL


 


Total Protein   5.8 L   (6.2-8.2)  g/dL


 


Albumin   3.4 L   (3.8-4.9)  g/dL


 


Albumin/Globulin Ratio   1.45 L   (1.60-3.17)  g/dL


 


Amylase   12 L   ()  U/L


 


Urine Protein     (Negative)  


 


Urine Glucose (UA)     (Negative)  


 


Urine Ketones     (Negative)  








                                 Diabetes panel











  06/10/22 Range/Units





  07:41 


 


Sodium  131 L  (135-145)  mmol/L


 


Potassium  3.3 L  (3.5-5.5)  mmol/L


 


Chloride  96  ()  mmol/L


 


Carbon Dioxide  23.1  (20.0-27.5)  mmol/L


 


BUN  8.0 L  (9.0-27.0)  mg/dL


 


Creatinine  0.5 L  (0.6-1.5)  mg/dL


 


Glucose  168 H  ()  mg/dL


 


Calcium  8.4 L  (8.7-10.3)  mg/dL


 


AST  16  (14-35)  U/L


 


ALT  21  (10-49)  U/L


 


Alkaline Phosphatase  81  ()  U/L


 


Total Protein  5.8 L  (6.2-8.2)  g/dL


 


Albumin  3.4 L  (3.8-4.9)  g/dL








                                  Calcium panel











  06/10/22 Range/Units





  07:41 


 


Calcium  8.4 L  (8.7-10.3)  mg/dL


 


Albumin  3.4 L  (3.8-4.9)  g/dL








                                 Pituitary panel











  06/10/22 Range/Units





  07:41 


 


Sodium  131 L  (135-145)  mmol/L


 


Potassium  3.3 L  (3.5-5.5)  mmol/L


 


Chloride  96  ()  mmol/L


 


Carbon Dioxide  23.1  (20.0-27.5)  mmol/L


 


BUN  8.0 L  (9.0-27.0)  mg/dL


 


Creatinine  0.5 L  (0.6-1.5)  mg/dL


 


Glucose  168 H  ()  mg/dL


 


Calcium  8.4 L  (8.7-10.3)  mg/dL








                                  Adrenal panel











  06/10/22 Range/Units





  07:41 


 


Sodium  131 L  (135-145)  mmol/L


 


Potassium  3.3 L  (3.5-5.5)  mmol/L


 


Chloride  96  ()  mmol/L


 


Carbon Dioxide  23.1  (20.0-27.5)  mmol/L


 


BUN  8.0 L  (9.0-27.0)  mg/dL


 


Creatinine  0.5 L  (0.6-1.5)  mg/dL


 


Glucose  168 H  ()  mg/dL


 


Calcium  8.4 L  (8.7-10.3)  mg/dL


 


Total Bilirubin  0.50  (0.30-1.20)  mg/dL


 


AST  16  (14-35)  U/L


 


ALT  21  (10-49)  U/L


 


Alkaline Phosphatase  81  ()  U/L


 


Total Protein  5.8 L  (6.2-8.2)  g/dL


 


Albumin  3.4 L  (3.8-4.9)  g/dL

## 2022-06-10 NOTE — XR
EXAMINATION TYPE: XR chest 1V

 

DATE OF EXAM: 6/10/2022

 

COMPARISON: 8/3/2021

 

HISTORY: Pain

 

TECHNIQUE: Single frontal view of the chest is obtained.

 

FINDINGS:  Biapical pleural thickening noted with small pleural effusions. There is widening of the m
ediastinum and left suprahilar soft tissue mass. Coarsened interstitium suggests chronic interstitial
 lung disease. Postsurgical change right shoulder. Nonspecific sclerotic changes involving the left h
umerus partially included on the exam to correlate with bone scan

 

IMPRESSION:  

1. Left upper lobe mass and small bilateral pleural effusions correlate for chronic interstitial lung
 disease, mediastinal adenopathy, and COPD.

## 2022-06-10 NOTE — XR
EXAMINATION TYPE: XR abdomen 2V

 

DATE OF EXAM: 6/10/2022

 

COMPARISON: NONE

 

HISTORY: Pain

 

TECHNIQUE: One view abdominal series

 

FINDINGS:  

The osseous structures are intact.  The bowel gas pattern is nonspecific. Postsurgical changes bilate
ral hip with vertebroplasty and degenerative changes with scoliosis spine. Retained fecal debris thro
ughout the colon. Calcifications in the pelvis are nonspecific

 

IMPRESSION: Nonspecific abdomen correlate constipation.

## 2022-06-10 NOTE — P.HPIM
History of Present Illness


H&P Date: 06/09/22


Chief Complaint: abdominal pain





Patient is a 62-year-old male metastatic Lung cancer, COPD, GERD, diabetes type 

2 non insulin-dependent, anxiety/depression and previous history of smoking 

presents to ER with complaints of abdominal pain mainly in the mid abdomen and 

bloating and distention.  Patient has been having symptoms since the yesterday. 

However his patient did have a small bowel movement yesterday.  Denies any 

nausea or vomiting.  No fever no chills.  No cough or sputum.  No chest pain or 

shortness of breath.  CT of the abdomen pelvis CT abdomen pelvis showed known 

gastric lesion is not appreciated by the CT scan.  No evidence of small or large

bowel obstruction.  Progressive metastatic pericardiophrenic and upper abdominal

nodules and lymph nodes present.  Interval progression of the metastatic nodule 

adjacent to the left adrenal gland measuring 3 cm compared to 1.8 cm 

previously.Patient is currently on chemotherapy and last dose was 2 weeks ago.  

Patient is supposed to get chemotherapy today.





Laboratory data showed WBC 7.9 hemoglobin 10.7 and platelets 70


Sodium 127 potassium 4.0 chloride 97 bicarb is 20 BUN 11 and creatinine 0.57 and

lactic acid 2.2 blood sugars 412








Review of Systems





Constitutional: Patient denies any fever or chills .  Generalized weakness.


Abdomen: Patient denied any nausea or vomiting.  Patient does have abdominal 

pain.


Cardiovascular: Patient denies any chest pain or short of breath no pal

pitations.


Respiratory: patient denied any cough is from production.  No shortness of 

breath


Neurologic: Patient denied any numbness or tingling headache.


Musculoskeletal: Patient denies any complaints of joint swelling or deformity.


Skin: Negative


Psychiatric: Negative


Endocrine: No heat or cold intolerance.  No recent weight gain.


Genitourinary: No dysuria or hematuria.


All other 14 point ROS negative except the above.








Past Medical History


Past Medical History: Cancer, COPD, GERD/Reflux


Additional Past Medical History / Comment(s): LUNG CANCER WITH TUMOR IN STOMACH 

AND STATES ALSO IN HIS NECK.,.  RECEIVING CHEMOTHERAPY., DDD WITH BACK ,HIP & 

LEG PAIN., (PAIN CLINIC PATIENT)., EMPHYSEMA., GASTRITIS.


History of Any Multi-Drug Resistant Organisms: None Reported


Past Surgical History: Joint Replacement, Orthopedic Surgery


Additional Past Surgical History / Comment(s): RIGHT AND LEFT TOTAL HIPS,  RIGHT

SHOULDER SURGERY.


Past Anesthesia/Blood Transfusion Reactions: No Reported Reaction


Past Psychological History: Anxiety, Depression


Smoking Status: Former smoker





- Past Family History


  ** Father


Family Medical History: No Reported History





Medications and Allergies


                                Home Medications











 Medication  Instructions  Recorded  Confirmed  Type


 


Atorvastatin [Lipitor] 20 mg PO DAILY 06/25/21 06/09/22 History


 


OXcarbazepine [Trileptal] 300 mg PO BID 06/25/21 06/09/22 History


 


Pantoprazole Sodium [Protonix] 40 mg PO BID 06/25/21 06/09/22 History


 


Sucralfate [Carafate] 1 gm PO QID 06/25/21 06/09/22 History


 


traZODone HCL [Desyrel] 100 mg PO HS 06/25/21 06/09/22 History


 


ALPRAZolam [Xanax] 0.5 mg PO BID 04/29/22 06/09/22 History


 


Budesonide [Pulmicort] 0.25 mg INHALATION RT-BID 04/29/22 06/09/22 History


 


Ergocalciferol [Vitamin D2 (1250 1,250 mcg PO Q7D 04/29/22 06/09/22 History





Mcg = 95640 Iu)]    


 


Famotidine [Pepcid] 40 mg PO DAILY 04/29/22 06/09/22 History


 


Hydrocortisone [Cortef] 10 mg PO W/SUPPER 04/29/22 06/03/22 History


 


Hydrocortisone [Cortef] 20 mg PO QAM 04/29/22 06/03/22 History


 


Ipratropium-Albuterol Nebulize 3 ml INHALATION RT-QID 04/29/22 06/09/22 History





[Duoneb 0.5 mg-3 mg/3 ml Soln]    


 


Omeprazole [PriLOSEC] 40 mg PO BID 04/29/22 06/09/22 History


 


Ondansetron [Zofran] 4 mg PO Q8HR PRN 04/29/22 06/09/22 History


 


metFORMIN  mg PO BID 04/29/22 06/09/22 History


 


HYDROcodone/APAP 10-325MG [Norco 1 tab PO Q12HR PRN 30 Days #60 tab 05/02/22 06/09/22 Rx





]    


 


Albuterol Nebulized [Ventolin 2.5 mg INHALATION RT-QID 06/09/22 06/09/22 History





Nebulized]    


 


Fluconazole [Diflucan] 100 mg PO DAILY PRN 06/09/22 06/09/22 History








                                    Allergies











Allergy/AdvReac Type Severity Reaction Status Date / Time


 


No Known Allergies Allergy   Verified 06/09/22 05:59














Physical Exam


Vitals: 


                                   Vital Signs











  Temp Pulse Pulse Resp BP BP Pulse Ox


 


 06/09/22 21:00  98.2 F   111 H  18   145/78  95


 


 06/09/22 20:34  100.2 F H  111 H   16  142/77   96


 


 06/09/22 20:00     18   


 


 06/09/22 18:05   98   18  138/76   95


 


 06/09/22 12:37   107 H   18  132/92   97


 


 06/09/22 12:10     18   


 


 06/09/22 09:58   107 H   18  151/83   96


 


 06/09/22 06:17   112 H   20  181/87  


 


 06/09/22 05:54  98.0 F  114 H   18  142/75   96








                                Intake and Output











 06/09/22 06/09/22 06/10/22





 14:59 22:59 06:59


 


Intake Total  240 


 


Balance  240 


 


Intake:   


 


  Oral  240 


 


Other:   


 


  Voiding Method  Toilet 


 


  Weight 78.925 kg  














PHYSICAL EXAMINATION: 


Patient is lying in the bed comfortably, no acute distress, awake alert and 

oriented.. 


HEENT: Normocephalic. Neck is supple. Pupils reactive. Nostrils clear. Oral 

cavity is moist. 


Neck reveals no JVD, carotid bruits, or thyromegaly. 


CHEST EXAMINATION: Trachea is central. Symmetrical expansion. Lung fields clear 

to auscultation and percussion. 


CARDIAC: Normal S1, S2 with no gallops. No murmurs 


ABDOMEN: Soft. Bowel sounds present.  Nontender.  No organomegaly. No abdominal 

bruits. 


Extremities: reveal no edema.  No clubbing or cyanosis


Neurologically awake, alert, oriented x3 with well-coordinated movements.  No 

focal deficits noted


Skin: No rash or skin lesions. 


Psychiatric: Coperative.  Nonsuicidal, anxious.


Musculoskeletal: No joint swelling or deformity.  Normal range of motion.





Results


CBC & Chem 7: 


                                 06/09/22 06:34





                                 06/09/22 06:34


Labs: 


                  Abnormal Lab Results - Last 24 Hours (Table)











  06/09/22 06/09/22 06/09/22 Range/Units





  06:34 06:34 06:34 


 


RBC  3.07 L    (4.30-5.90)  m/uL


 


Hgb  10.7 L    (13.0-17.5)  gm/dL


 


Hct  32.7 L    (39.0-53.0)  %


 


MCV  106.6 H    (80.0-100.0)  fL


 


MCH  35.1 H    (25.0-35.0)  pg


 


RDW  18.5 H    (11.5-15.5)  %


 


Plt Count  70 L    (150-450)  k/uL


 


Macrocytosis  Marked A    


 


Sodium   127 L   (137-145)  mmol/L


 


Chloride   97 L   ()  mmol/L


 


Carbon Dioxide   20 L   (22-30)  mmol/L


 


Creatinine   0.57 L   (0.66-1.25)  mg/dL


 


Glucose   412 H   (74-99)  mg/dL


 


Plasma Lactic Acid [place]    2.2 H*  (0.7-2.0)  mmol/L


 


Urine Glucose (UA)     (Negative)  














  06/09/22 Range/Units





  07:35 


 


RBC   (4.30-5.90)  m/uL


 


Hgb   (13.0-17.5)  gm/dL


 


Hct   (39.0-53.0)  %


 


MCV   (80.0-100.0)  fL


 


MCH   (25.0-35.0)  pg


 


RDW   (11.5-15.5)  %


 


Plt Count   (150-450)  k/uL


 


Macrocytosis   


 


Sodium   (137-145)  mmol/L


 


Chloride   ()  mmol/L


 


Carbon Dioxide   (22-30)  mmol/L


 


Creatinine   (0.66-1.25)  mg/dL


 


Glucose   (74-99)  mg/dL


 


Plasma Lactic Acid [place]   (0.7-2.0)  mmol/L


 


Urine Glucose (UA)  4+ H  (Negative)  














Thrombosis Risk Factor Assmnt





- DVT/VTE Prophylaxis


DVT/VTE Prophylaxis: Pharmacologic Prophylaxis ordered





- Choose All That Apply


Any of the Below Risk Factors Present?: Yes


Each Risk Factor Represents 2 Points: Age 61-74 years, Malignancy


Thrombosis Risk Factor Assessment Total Risk Factor Score: 4


Thrombosis Risk Factor Assessment Level: Moderate Risk





Assessment and Plan


Assessment: 








Abdominal pain likely due to interval progression of the metastatic nodules.


New metastatic nodular lesion to the left adrenal gland unlikely the cause of 

pain.


Lactic acidosis


Hyperglycemia with uncontrolled diabetes type 2 non-insulin-dependent


Hypovolemic hyponatremia/pseudohyponatremia


Thrombocytopenia


COPD not in exacerbation


GERD


Anxiety/depression


Previous history of smoking





Plan:


Patient will be current on IV hydration and pain management with Dilaudid IV.  

Continue with insulin sliding scale and will add long-acting insulin as needed.


Patient is also on hydrocortisone at home.  Oncology was consulted and follow-up

 closely.  Continue with supportive care.  Prognosis is guarded at this time.





Time with Patient: Greater than 30

## 2022-06-10 NOTE — P.PN
Subjective


Progress Note Date: 06/10/22


Principal diagnosis: 





ITP





Abdominal Xray without obstuction





Objective





- Vital Signs


Vital signs: 


                                   Vital Signs











Temp  98.2 F   06/10/22 05:08


 


Pulse  80   06/10/22 08:27


 


Resp  18   06/10/22 05:08


 


BP  146/84   06/10/22 05:08


 


Pulse Ox  96   06/10/22 05:08


 


FiO2      








                                 Intake & Output











 06/09/22 06/10/22 06/10/22





 18:59 06:59 18:59


 


Intake Total  240 


 


Balance  240 


 


Weight 78.925 kg  


 


Intake:   


 


  Oral  240 


 


Other:   


 


  Voiding Method  Toilet Toilet


 


  # Voids  1 














- Exam





 Constitutional


General appearance: average body habitus, cooperative, no acute distress





- EENT


Eyes: anicteric sclerae, EOMI


ENT: hearing grossly normal, normal oropharynx





- Neck


Neck: no lymphadenopathy





- Respiratory


Respiratory: bilateral: CTA, diminished





- Cardiovascular


Rhythm: regular


Heart sounds: normal: S1, S2


Abnormal Heart Sounds: no systolic murmur, no diastolic murmur, no rub, no S3 

Gallop, no S4 Gallop, no click, no other


  ** leg


Peripheral Edema: bilateral: None





- Gastrointestinal


General gastrointestinal: no absent bowel sounds, decreased bowel sounds, no 

distended, no hepatomegaly, no hyperactive bowel sounds, no normal bowel sounds,

no organomegaly, no rigid, no scaphoid, soft, no splenomegaly, no tenderness, no

umbilical hernia, no ventral hernia





- Integumentary


Integumentary: pale





- Neurologic


Neurologic: CNII-XII intact





- Musculoskeletal


Musculoskeletal: strength equal bilaterally





- Labs


CBC & Chem 7: 


                                 06/10/22 07:41





                                 06/10/22 07:41


Labs: 


                  Abnormal Lab Results - Last 24 Hours (Table)











  06/10/22 06/10/22 06/10/22 Range/Units





  01:35 02:23 07:37 


 


Sodium     (135-145)  mmol/L


 


Potassium     (3.5-5.5)  mmol/L


 


BUN     (9.0-27.0)  mg/dL


 


Creatinine     (0.6-1.5)  mg/dL


 


Glucose     ()  mg/dL


 


POC Glucose (mg/dL)   184 H  183 H  (75-99)  mg/dL


 


Calcium     (8.7-10.3)  mg/dL


 


Iron     ()  ug/dL


 


TIBC     (228-460)  ug/dL


 


Transferrin     (204.0-354.0)  mg/dL


 


Ferritin     (22.0-322.0)  ng/mL


 


Total Protein     (6.2-8.2)  g/dL


 


Albumin     (3.8-4.9)  g/dL


 


Albumin/Globulin Ratio     (1.60-3.17)  g/dL


 


Amylase     ()  U/L


 


Urine Protein  Trace H    (Negative)  


 


Urine Glucose (UA)  3+ H    (Negative)  


 


Urine Ketones  3+ H    (Negative)  














  06/10/22 06/10/22 Range/Units





  07:41 11:10 


 


Sodium  131 L   (135-145)  mmol/L


 


Potassium  3.3 L   (3.5-5.5)  mmol/L


 


BUN  8.0 L   (9.0-27.0)  mg/dL


 


Creatinine  0.5 L   (0.6-1.5)  mg/dL


 


Glucose  168 H   ()  mg/dL


 


POC Glucose (mg/dL)   155 H  (75-99)  mg/dL


 


Calcium  8.4 L   (8.7-10.3)  mg/dL


 


Iron  34 L   ()  ug/dL


 


TIBC  213 L   (228-460)  ug/dL


 


Transferrin  152.0 L   (204.0-354.0)  mg/dL


 


Ferritin  422.0 H   (22.0-322.0)  ng/mL


 


Total Protein  5.8 L   (6.2-8.2)  g/dL


 


Albumin  3.4 L   (3.8-4.9)  g/dL


 


Albumin/Globulin Ratio  1.45 L   (1.60-3.17)  g/dL


 


Amylase  12 L   ()  U/L


 


Urine Protein    (Negative)  


 


Urine Glucose (UA)    (Negative)  


 


Urine Ketones    (Negative)  














Assessment and Plan


Plan: 





CT scan - abdomen: report reviewed


CT scan - pelvis: report reviewed





Assessment and Plan


(1) Abdominal pain


Current Visit: Yes   Status: Acute   Priority: High   Code(s): R10.9 - UN

SPECIFIED ABDOMINAL PAIN   SNOMED Code(s): 40652169


    - Compoenent of constipation but ensure no evidence of obstruction or 

ischemic bowel





(2) Hyperglycemia


Current Visit: Yes   Status: Acute   Priority: High   Code(s): R73.9 - 

HYPERGLYCEMIA, UNSPECIFIED   SNOMED Code(s): 31548399


   





(3) Small cell lung cancer


Current Visit: No   Status: Chronic   Priority: Medium   Code(s): C34.90 - 

MALIGNANT NEOPLASM OF UNSP PART OF UNSP BRONCHUS OR LUNG   SNOMED Code(s): 

233847870


   


Plan: 


Dr. Freire reviewed CT of the abdomen and pelvis with patient yesterday evening.  

There is an adrenal nodule that is enlarged but not felt to be contributing to 

patient's acute situation.  


Patient has been using a very excessive amount of PPIs, changed to IV PPI and 

Carafateper Dr. Freirebefore meals and at bedtime.





With Excessive PPI use must consider bacterial overgrowth and/or fungal 

compoenent - EGD maybe beneficial





Consulted Surgeon to evaluate patient, concerns for maybe in early onset 

ischemic bowel?  Pending their evaluation and recommendations as they have not 

seen at his time yet. .





Patient is actually been doing pretty well on this most recent treatment for 

small cell lung cancer.  Pending recovery from his current situation, treatment 

will be rescheduled.





Xray of abdomene with fecal status:


 - Bowel regimen


 - Reglan (?) await Surgery feliz Yee attests: I have performed H&P, seen and examined patient, developed 

impression and plan of care.  Discussed with dictator.  Agree with documentation

dictated as a scribe

## 2022-06-11 LAB
GLUCOSE BLD-MCNC: 107 MG/DL (ref 75–99)
GLUCOSE BLD-MCNC: 124 MG/DL (ref 75–99)
GLUCOSE BLD-MCNC: 129 MG/DL (ref 75–99)
GLUCOSE BLD-MCNC: 97 MG/DL (ref 75–99)

## 2022-06-11 RX ADMIN — DOCUSATE SODIUM AND SENNOSIDES SCH EACH: 50; 8.6 TABLET ORAL at 08:06

## 2022-06-11 RX ADMIN — INSULIN ASPART SCH: 100 INJECTION, SOLUTION INTRAVENOUS; SUBCUTANEOUS at 17:31

## 2022-06-11 RX ADMIN — ISODIUM CHLORIDE SCH MG: 0.03 SOLUTION RESPIRATORY (INHALATION) at 09:04

## 2022-06-11 RX ADMIN — HYDROMORPHONE HYDROCHLORIDE PRN MG: 1 INJECTION, SOLUTION INTRAMUSCULAR; INTRAVENOUS; SUBCUTANEOUS at 10:43

## 2022-06-11 RX ADMIN — ISODIUM CHLORIDE SCH MG: 0.03 SOLUTION RESPIRATORY (INHALATION) at 11:36

## 2022-06-11 RX ADMIN — ISODIUM CHLORIDE SCH: 0.03 SOLUTION RESPIRATORY (INHALATION) at 10:34

## 2022-06-11 RX ADMIN — SUCRALFATE SCH GM: 1 TABLET ORAL at 08:06

## 2022-06-11 RX ADMIN — SUCRALFATE SCH GM: 1 TABLET ORAL at 12:46

## 2022-06-11 RX ADMIN — HYDROMORPHONE HYDROCHLORIDE PRN MG: 1 INJECTION, SOLUTION INTRAMUSCULAR; INTRAVENOUS; SUBCUTANEOUS at 13:24

## 2022-06-11 RX ADMIN — PANTOPRAZOLE SODIUM SCH MG: 40 INJECTION, POWDER, FOR SOLUTION INTRAVENOUS at 21:25

## 2022-06-11 RX ADMIN — CEFAZOLIN SCH MLS/HR: 330 INJECTION, POWDER, FOR SOLUTION INTRAMUSCULAR; INTRAVENOUS at 15:52

## 2022-06-11 RX ADMIN — HYDROMORPHONE HYDROCHLORIDE PRN MG: 1 INJECTION, SOLUTION INTRAMUSCULAR; INTRAVENOUS; SUBCUTANEOUS at 19:11

## 2022-06-11 RX ADMIN — HYDROMORPHONE HYDROCHLORIDE PRN MG: 1 INJECTION, SOLUTION INTRAMUSCULAR; INTRAVENOUS; SUBCUTANEOUS at 02:39

## 2022-06-11 RX ADMIN — INSULIN ASPART SCH: 100 INJECTION, SOLUTION INTRAVENOUS; SUBCUTANEOUS at 11:59

## 2022-06-11 RX ADMIN — ISODIUM CHLORIDE SCH MG: 0.03 SOLUTION RESPIRATORY (INHALATION) at 19:06

## 2022-06-11 RX ADMIN — SUCRALFATE SCH GM: 1 TABLET ORAL at 21:24

## 2022-06-11 RX ADMIN — SUCRALFATE SCH GM: 1 TABLET ORAL at 15:52

## 2022-06-11 RX ADMIN — ENOXAPARIN SODIUM SCH MG: 40 INJECTION SUBCUTANEOUS at 08:06

## 2022-06-11 RX ADMIN — ATORVASTATIN CALCIUM SCH MG: 20 TABLET, FILM COATED ORAL at 08:06

## 2022-06-11 RX ADMIN — BUDESONIDE SCH MG: 0.25 SUSPENSION RESPIRATORY (INHALATION) at 09:02

## 2022-06-11 RX ADMIN — HYDROMORPHONE HYDROCHLORIDE PRN MG: 1 INJECTION, SOLUTION INTRAMUSCULAR; INTRAVENOUS; SUBCUTANEOUS at 05:28

## 2022-06-11 RX ADMIN — ISODIUM CHLORIDE SCH: 0.03 SOLUTION RESPIRATORY (INHALATION) at 07:34

## 2022-06-11 RX ADMIN — PANTOPRAZOLE SODIUM SCH MG: 40 INJECTION, POWDER, FOR SOLUTION INTRAVENOUS at 08:06

## 2022-06-11 RX ADMIN — HYDROMORPHONE HYDROCHLORIDE PRN MG: 1 INJECTION, SOLUTION INTRAMUSCULAR; INTRAVENOUS; SUBCUTANEOUS at 08:07

## 2022-06-11 RX ADMIN — DOCUSATE SODIUM AND SENNOSIDES SCH EACH: 50; 8.6 TABLET ORAL at 21:25

## 2022-06-11 RX ADMIN — ISODIUM CHLORIDE SCH MG: 0.03 SOLUTION RESPIRATORY (INHALATION) at 15:05

## 2022-06-11 RX ADMIN — CEFAZOLIN SCH MLS/HR: 330 INJECTION, POWDER, FOR SOLUTION INTRAMUSCULAR; INTRAVENOUS at 04:18

## 2022-06-11 RX ADMIN — ISODIUM CHLORIDE SCH: 0.03 SOLUTION RESPIRATORY (INHALATION) at 07:35

## 2022-06-11 RX ADMIN — HYDROMORPHONE HYDROCHLORIDE PRN MG: 1 INJECTION, SOLUTION INTRAMUSCULAR; INTRAVENOUS; SUBCUTANEOUS at 22:02

## 2022-06-11 RX ADMIN — INSULIN ASPART SCH: 100 INJECTION, SOLUTION INTRAVENOUS; SUBCUTANEOUS at 21:25

## 2022-06-11 RX ADMIN — HYDROMORPHONE HYDROCHLORIDE PRN MG: 1 INJECTION, SOLUTION INTRAMUSCULAR; INTRAVENOUS; SUBCUTANEOUS at 15:53

## 2022-06-11 RX ADMIN — INSULIN ASPART SCH: 100 INJECTION, SOLUTION INTRAVENOUS; SUBCUTANEOUS at 07:26

## 2022-06-11 RX ADMIN — BUDESONIDE SCH MG: 0.25 SUSPENSION RESPIRATORY (INHALATION) at 19:07

## 2022-06-11 NOTE — P.PN
Subjective


Progress Note Date: 06/11/22


Principal diagnosis: 





Abdominal pain likely due to interval progression of the metastatic nodules.


New metastatic nodular lesion to the left adrenal gland unlikely the cause of 

pain.


Lactic acidosis


Hyperglycemia with uncontrolled diabetes type 2 non-insulin-dependent


Hypovolemic hyponatremia/pseudohyponatremia


Thrombocytopenia





 62-year-old male metastatic Lung cancer, COPD, GERD, diabetes type 2 non 

insulin-dependent, anxiety/depression and previous history of smoking presents 

to ER with complaints of abdominal pain mainly in the mid abdomen and bloating 

and distention.  Patient has been having symptoms since the yesterday.  However 

his patient did have a small bowel movement yesterday.  Denies any nausea or 

vomiting.  No fever no chills.  No cough or sputum.  No chest pain or shortness 

of breath.  CT of the abdomen pelvis CT abdomen pelvis showed known gastric 

lesion is not appreciated by the CT scan.  No evidence of small or large bowel o

bstruction.  Progressive metastatic pericardiophrenic and upper abdominal 

nodules and lymph nodes present.  Interval progression of the metastatic nodule 

adjacent to the left adrenal gland measuring 3 cm compared to 1.8 cm 

previously.Patient is currently on chemotherapy and last dose was 2 weeks ago.  

Patient is supposed to get chemotherapy today.





Laboratory data showed WBC 7.9 hemoglobin 10.7 and platelets 70


Sodium 127 potassium 4.0 chloride 97 bicarb is 20 BUN 11 and creatinine 0.57 and

lactic acid 2.2 blood sugars 412





06/11/2022


Patient continues to have complaints of back pain and some mild epigastric pain.

 He is tolerating clear liquids.


Vital signs are reviewed and reveal temperature 90.8, pulse 1:15, respiration 18

and blood pressure 112/61 and O2 saturation of 96% on


On exam vitals are stable.  Abdomen soft.


Metastatic cancer multiple rhytides.  Patient will continue to have supportive 

care.  No surgical intervention is planned.  He is tolerating his diet 

currently.





Objective





- Vital Signs


Vital signs: 


                                   Vital Signs











Temp  98.8 F   06/11/22 11:32


 


Pulse  112 H  06/11/22 11:47


 


Resp  18   06/11/22 11:32


 


BP  112/61   06/11/22 11:32


 


Pulse Ox  96   06/11/22 11:32


 


FiO2      








                                 Intake & Output











 06/10/22 06/11/22 06/11/22





 18:59 06:59 18:59


 


Intake Total 1020 1330 


 


Balance 1020 1330 


 


Intake:   


 


  Intake, IV Titration 900 750 





  Amount   


 


    Sodium Chloride 0.9% 1, 900 750 





    000 ml @ 75 mls/hr IV .   





    [id number] [facility] Rx#:[id number]   


 


  Oral 120 580 


 


Other:   


 


  Voiding Method Toilet Toilet 


 


  # Voids  5 














- Exam





Patient is lying in the bed comfortably, no acute distress, awake alert and 

oriented.. 


HEENT: Normocephalic. Neck is supple. Pupils reactive. Nostrils clear. Oral 

cavity is moist. 


Neck reveals no JVD, carotid bruits, or thyromegaly. 


CHEST EXAMINATION: Trachea is central. Symmetrical expansion. Lung fields clear 

to auscultation and percussion. 


CARDIAC: Normal S1, S2 with no gallops. No murmurs 


ABDOMEN: Soft. Bowel sounds present.  Nontender.  No organomegaly. No abdominal 

bruits. 


Extremities: reveal no edema.  No clubbing or cyanosis


Neurologically awake, alert, oriented x3 with well-coordinated movements.  No 

focal deficits noted


Skin: No rash or skin lesions. 





- Labs


CBC & Chem 7: 


                                 06/10/22 07:41





                                 06/10/22 07:41


Labs: 


                  Abnormal Lab Results - Last 24 Hours (Table)











  06/10/22 06/10/22 06/11/22 Range/Units





  16:58 20:09 07:10 


 


POC Glucose (mg/dL)  131 H  121 H  124 H  (75-99)  mg/dL














Assessment and Plan


Assessment: 





Abdominal pain likely due to interval progression of the metastatic nodules.


New metastatic nodular lesion to the left adrenal gland unlikely the cause of 

pain.


Lactic acidosis


Hyperglycemia with uncontrolled diabetes type 2 non-insulin-dependent


Hypovolemic hyponatremia/pseudohyponatremia


Thrombocytopenia


COPD not in exacerbation


GERD


Anxiety/depression


Previous history of smoking





Plan:


Patient will be current on IV hydration and pain management with Dilaudid IV.  

Continue with insulin sliding scale and will add long-acting insulin as needed.


Patient is also on hydrocortisone at home.  Oncology was consulted and follow-up

closely.  Continue with supportive care.  Prognosis is guarded at this time.

## 2022-06-11 NOTE — P.PN
Progress Note - Text


Progress Note Date: 06/11/22





Patient has some complaints of back pain and some mild epigastric pain.  He is 

tolerating clear liquids.





On exam vitals are stable.  Abdomen soft.





Metastatic cancer multiple rhytides.  Patient will continue to have supportive 

care.  No surgical intervention is planned.  He is tolerating his diet 

currently.

## 2022-06-12 VITALS — RESPIRATION RATE: 16 BRPM

## 2022-06-12 LAB
ANION GAP SERPL CALC-SCNC: 10.8 MMOL/L (ref 10–18)
BASOPHILS # BLD AUTO: 0.02 X 10*3/UL (ref 0–0.1)
BASOPHILS NFR BLD AUTO: 0.3 %
BUN SERPL-SCNC: 5 MG/DL (ref 9–27)
BUN/CREAT SERPL: 8.31 RATIO (ref 12–20)
CALCIUM SPEC-MCNC: 8.3 MG/DL (ref 8.7–10.3)
CHLORIDE SERPL-SCNC: 98 MMOL/L (ref 96–109)
CO2 SERPL-SCNC: 23.3 MMOL/L (ref 20–27.5)
EOSINOPHIL # BLD AUTO: 0.12 X 10*3/UL (ref 0.04–0.35)
EOSINOPHIL NFR BLD AUTO: 1.7 %
ERYTHROCYTE [DISTWIDTH] IN BLOOD BY AUTOMATED COUNT: 2.61 X 10*6/UL (ref 4.4–5.6)
ERYTHROCYTE [DISTWIDTH] IN BLOOD: 19.2 % (ref 11.5–14.5)
GLUCOSE BLD-MCNC: 102 MG/DL (ref 75–99)
GLUCOSE BLD-MCNC: 112 MG/DL (ref 75–99)
GLUCOSE BLD-MCNC: 131 MG/DL (ref 75–99)
GLUCOSE BLD-MCNC: 94 MG/DL (ref 75–99)
GLUCOSE SERPL-MCNC: 122 MG/DL (ref 70–110)
HCT VFR BLD AUTO: 27.5 % (ref 39.6–50)
HGB BLD-MCNC: 9.1 G/DL (ref 13–17)
IMM GRANULOCYTES BLD QL AUTO: 0.4 %
LYMPHOCYTES # SPEC AUTO: 1.07 X 10*3/UL (ref 0.9–5)
LYMPHOCYTES NFR SPEC AUTO: 15 %
MCH RBC QN AUTO: 34.9 PG (ref 27–32)
MCHC RBC AUTO-ENTMCNC: 33.1 G/DL (ref 32–37)
MCV RBC AUTO: 105.4 FL (ref 80–97)
MONOCYTES # BLD AUTO: 0.95 X 10*3/UL (ref 0.2–1)
MONOCYTES NFR BLD AUTO: 13.3 %
NEUTROPHILS # BLD AUTO: 4.96 X 10*3/UL (ref 1.8–7.7)
NEUTROPHILS NFR BLD AUTO: 69.3 %
NRBC BLD AUTO-RTO: 0 /100 WBCS (ref 0–0)
PLATELET # BLD AUTO: 80 X 10*3/UL (ref 140–440)
POTASSIUM SERPL-SCNC: 3.1 MMOL/L (ref 3.5–5.5)
ROULEAUX BLD QL SMEAR: PRESENT
SODIUM SERPL-SCNC: 132 MMOL/L (ref 135–145)
WBC # BLD AUTO: 7.15 X 10*3/UL (ref 4.5–10)

## 2022-06-12 RX ADMIN — HYDROMORPHONE HYDROCHLORIDE PRN MG: 1 INJECTION, SOLUTION INTRAMUSCULAR; INTRAVENOUS; SUBCUTANEOUS at 10:00

## 2022-06-12 RX ADMIN — HYDROMORPHONE HYDROCHLORIDE PRN MG: 1 INJECTION, SOLUTION INTRAMUSCULAR; INTRAVENOUS; SUBCUTANEOUS at 07:16

## 2022-06-12 RX ADMIN — ISODIUM CHLORIDE SCH MG: 0.03 SOLUTION RESPIRATORY (INHALATION) at 07:44

## 2022-06-12 RX ADMIN — SUCRALFATE SCH GM: 1 TABLET ORAL at 21:07

## 2022-06-12 RX ADMIN — DOCUSATE SODIUM AND SENNOSIDES SCH: 50; 8.6 TABLET ORAL at 21:08

## 2022-06-12 RX ADMIN — POTASSIUM CHLORIDE SCH MEQ: 20 TABLET, EXTENDED RELEASE ORAL at 16:48

## 2022-06-12 RX ADMIN — DOCUSATE SODIUM AND SENNOSIDES SCH EACH: 50; 8.6 TABLET ORAL at 07:15

## 2022-06-12 RX ADMIN — ISODIUM CHLORIDE SCH MG: 0.03 SOLUTION RESPIRATORY (INHALATION) at 11:51

## 2022-06-12 RX ADMIN — INSULIN ASPART SCH: 100 INJECTION, SOLUTION INTRAVENOUS; SUBCUTANEOUS at 17:19

## 2022-06-12 RX ADMIN — POTASSIUM CHLORIDE SCH MEQ: 20 TABLET, EXTENDED RELEASE ORAL at 11:06

## 2022-06-12 RX ADMIN — SUCRALFATE SCH GM: 1 TABLET ORAL at 16:08

## 2022-06-12 RX ADMIN — HYDROMORPHONE HYDROCHLORIDE PRN MG: 1 INJECTION, SOLUTION INTRAMUSCULAR; INTRAVENOUS; SUBCUTANEOUS at 04:10

## 2022-06-12 RX ADMIN — BUDESONIDE SCH MG: 0.25 SUSPENSION RESPIRATORY (INHALATION) at 07:44

## 2022-06-12 RX ADMIN — HYDROMORPHONE HYDROCHLORIDE PRN MG: 1 INJECTION, SOLUTION INTRAMUSCULAR; INTRAVENOUS; SUBCUTANEOUS at 01:23

## 2022-06-12 RX ADMIN — INSULIN ASPART SCH: 100 INJECTION, SOLUTION INTRAVENOUS; SUBCUTANEOUS at 07:22

## 2022-06-12 RX ADMIN — HYDROMORPHONE HYDROCHLORIDE PRN MG: 1 INJECTION, SOLUTION INTRAMUSCULAR; INTRAVENOUS; SUBCUTANEOUS at 19:25

## 2022-06-12 RX ADMIN — CEFAZOLIN SCH MLS/HR: 330 INJECTION, POWDER, FOR SOLUTION INTRAMUSCULAR; INTRAVENOUS at 16:08

## 2022-06-12 RX ADMIN — SUCRALFATE SCH: 1 TABLET ORAL at 13:41

## 2022-06-12 RX ADMIN — ATORVASTATIN CALCIUM SCH MG: 20 TABLET, FILM COATED ORAL at 07:16

## 2022-06-12 RX ADMIN — INSULIN ASPART SCH: 100 INJECTION, SOLUTION INTRAVENOUS; SUBCUTANEOUS at 12:34

## 2022-06-12 RX ADMIN — ISODIUM CHLORIDE SCH MG: 0.03 SOLUTION RESPIRATORY (INHALATION) at 19:33

## 2022-06-12 RX ADMIN — HYDROMORPHONE HYDROCHLORIDE PRN MG: 1 INJECTION, SOLUTION INTRAMUSCULAR; INTRAVENOUS; SUBCUTANEOUS at 22:13

## 2022-06-12 RX ADMIN — PANTOPRAZOLE SODIUM SCH MG: 40 INJECTION, POWDER, FOR SOLUTION INTRAVENOUS at 21:08

## 2022-06-12 RX ADMIN — BUDESONIDE SCH MG: 0.25 SUSPENSION RESPIRATORY (INHALATION) at 19:33

## 2022-06-12 RX ADMIN — SUCRALFATE SCH GM: 1 TABLET ORAL at 07:16

## 2022-06-12 RX ADMIN — PANTOPRAZOLE SODIUM SCH MG: 40 INJECTION, POWDER, FOR SOLUTION INTRAVENOUS at 07:16

## 2022-06-12 RX ADMIN — ENOXAPARIN SODIUM SCH MG: 40 INJECTION SUBCUTANEOUS at 07:15

## 2022-06-12 RX ADMIN — POTASSIUM CHLORIDE SCH MEQ: 20 TABLET, EXTENDED RELEASE ORAL at 10:00

## 2022-06-12 RX ADMIN — INSULIN ASPART SCH: 100 INJECTION, SOLUTION INTRAVENOUS; SUBCUTANEOUS at 21:06

## 2022-06-12 RX ADMIN — HYDROMORPHONE HYDROCHLORIDE PRN MG: 1 INJECTION, SOLUTION INTRAMUSCULAR; INTRAVENOUS; SUBCUTANEOUS at 13:38

## 2022-06-12 RX ADMIN — ISODIUM CHLORIDE SCH MG: 0.03 SOLUTION RESPIRATORY (INHALATION) at 15:12

## 2022-06-12 RX ADMIN — POTASSIUM CHLORIDE SCH MEQ: 20 TABLET, EXTENDED RELEASE ORAL at 16:08

## 2022-06-12 RX ADMIN — HYDROMORPHONE HYDROCHLORIDE PRN MG: 1 INJECTION, SOLUTION INTRAMUSCULAR; INTRAVENOUS; SUBCUTANEOUS at 16:47

## 2022-06-12 RX ADMIN — CEFAZOLIN SCH MLS/HR: 330 INJECTION, POWDER, FOR SOLUTION INTRAMUSCULAR; INTRAVENOUS at 04:13

## 2022-06-12 NOTE — P.PN
Progress Note - Text


Progress Note Date: 06/12/22





Patient is clinically unchanged.  He is tolerating a diet.  His abdomen soft.





Metastatic cancer.  Patient feels supportive care.

## 2022-06-13 LAB
CELLS COUNTED: 100
EOSINOPHIL # BLD MANUAL: 0.26 K/UL (ref 0–0.7)
ERYTHROCYTE [DISTWIDTH] IN BLOOD BY AUTOMATED COUNT: 2.63 M/UL (ref 4.3–5.9)
ERYTHROCYTE [DISTWIDTH] IN BLOOD: 18.1 % (ref 11.5–15.5)
GLUCOSE BLD-MCNC: 104 MG/DL (ref 75–99)
GLUCOSE BLD-MCNC: 112 MG/DL (ref 75–99)
GLUCOSE BLD-MCNC: 116 MG/DL (ref 75–99)
GLUCOSE BLD-MCNC: 151 MG/DL (ref 75–99)
HCT VFR BLD AUTO: 28.8 % (ref 39–53)
HGB BLD-MCNC: 9.3 GM/DL (ref 13–17.5)
LYMPHOCYTES # BLD MANUAL: 1.17 K/UL (ref 1–4.8)
MAGNESIUM SPEC-SCNC: 1.7 MG/DL (ref 1.6–2.3)
MCH RBC QN AUTO: 35.5 PG (ref 25–35)
MCHC RBC AUTO-ENTMCNC: 32.4 G/DL (ref 31–37)
MCV RBC AUTO: 109.6 FL (ref 80–100)
MONOCYTES # BLD MANUAL: 0.51 K/UL (ref 0–1)
NEUTROPHILS NFR BLD MANUAL: 62 %
NEUTS SEG # BLD MANUAL: 3.16 K/UL (ref 1.3–7.7)
PLATELET # BLD AUTO: 72 K/UL (ref 150–450)
POTASSIUM SERPL-SCNC: 3.6 MMOL/L (ref 3.5–5.1)
WBC # BLD AUTO: 5.1 K/UL (ref 3.8–10.6)

## 2022-06-13 RX ADMIN — DOCUSATE SODIUM AND SENNOSIDES SCH EACH: 50; 8.6 TABLET ORAL at 21:47

## 2022-06-13 RX ADMIN — BUDESONIDE SCH MG: 0.25 SUSPENSION RESPIRATORY (INHALATION) at 08:59

## 2022-06-13 RX ADMIN — INSULIN ASPART SCH: 100 INJECTION, SOLUTION INTRAVENOUS; SUBCUTANEOUS at 07:35

## 2022-06-13 RX ADMIN — PANTOPRAZOLE SODIUM SCH MG: 40 INJECTION, POWDER, FOR SOLUTION INTRAVENOUS at 21:47

## 2022-06-13 RX ADMIN — SUCRALFATE SCH GM: 1 TABLET ORAL at 21:47

## 2022-06-13 RX ADMIN — DOCUSATE SODIUM AND SENNOSIDES SCH EACH: 50; 8.6 TABLET ORAL at 08:20

## 2022-06-13 RX ADMIN — HYDROMORPHONE HYDROCHLORIDE PRN MG: 1 INJECTION, SOLUTION INTRAMUSCULAR; INTRAVENOUS; SUBCUTANEOUS at 09:50

## 2022-06-13 RX ADMIN — ISODIUM CHLORIDE SCH MG: 0.03 SOLUTION RESPIRATORY (INHALATION) at 11:49

## 2022-06-13 RX ADMIN — CEFAZOLIN SCH MLS/HR: 330 INJECTION, POWDER, FOR SOLUTION INTRAMUSCULAR; INTRAVENOUS at 08:19

## 2022-06-13 RX ADMIN — INSULIN ASPART SCH: 100 INJECTION, SOLUTION INTRAVENOUS; SUBCUTANEOUS at 12:09

## 2022-06-13 RX ADMIN — MAGNESIUM SULFATE IN DEXTROSE SCH MLS/HR: 10 INJECTION, SOLUTION INTRAVENOUS at 19:36

## 2022-06-13 RX ADMIN — INSULIN ASPART SCH: 100 INJECTION, SOLUTION INTRAVENOUS; SUBCUTANEOUS at 17:42

## 2022-06-13 RX ADMIN — IPRATROPIUM BROMIDE AND ALBUTEROL SULFATE SCH ML: .5; 3 SOLUTION RESPIRATORY (INHALATION) at 16:04

## 2022-06-13 RX ADMIN — MAGNESIUM SULFATE IN DEXTROSE SCH MLS/HR: 10 INJECTION, SOLUTION INTRAVENOUS at 17:45

## 2022-06-13 RX ADMIN — SUCRALFATE SCH GM: 1 TABLET ORAL at 08:20

## 2022-06-13 RX ADMIN — ISODIUM CHLORIDE SCH MG: 0.03 SOLUTION RESPIRATORY (INHALATION) at 08:59

## 2022-06-13 RX ADMIN — INSULIN ASPART SCH UNIT: 100 INJECTION, SOLUTION INTRAVENOUS; SUBCUTANEOUS at 21:47

## 2022-06-13 RX ADMIN — HYDROMORPHONE HYDROCHLORIDE PRN MG: 1 INJECTION, SOLUTION INTRAMUSCULAR; INTRAVENOUS; SUBCUTANEOUS at 01:14

## 2022-06-13 RX ADMIN — SUCRALFATE SCH GM: 1 TABLET ORAL at 12:42

## 2022-06-13 RX ADMIN — BUDESONIDE SCH MG: 0.25 SUSPENSION RESPIRATORY (INHALATION) at 20:16

## 2022-06-13 RX ADMIN — SUCRALFATE SCH GM: 1 TABLET ORAL at 17:45

## 2022-06-13 RX ADMIN — CEFAZOLIN SCH MLS/HR: 330 INJECTION, POWDER, FOR SOLUTION INTRAMUSCULAR; INTRAVENOUS at 17:05

## 2022-06-13 RX ADMIN — IPRATROPIUM BROMIDE AND ALBUTEROL SULFATE SCH ML: .5; 3 SOLUTION RESPIRATORY (INHALATION) at 20:08

## 2022-06-13 RX ADMIN — HYDROMORPHONE HYDROCHLORIDE PRN MG: 1 INJECTION, SOLUTION INTRAMUSCULAR; INTRAVENOUS; SUBCUTANEOUS at 06:34

## 2022-06-13 RX ADMIN — PANTOPRAZOLE SODIUM SCH MG: 40 INJECTION, POWDER, FOR SOLUTION INTRAVENOUS at 08:20

## 2022-06-13 RX ADMIN — HYDROMORPHONE HYDROCHLORIDE PRN MG: 1 INJECTION, SOLUTION INTRAMUSCULAR; INTRAVENOUS; SUBCUTANEOUS at 22:28

## 2022-06-13 RX ADMIN — ATORVASTATIN CALCIUM SCH MG: 20 TABLET, FILM COATED ORAL at 08:21

## 2022-06-13 RX ADMIN — ENOXAPARIN SODIUM SCH MG: 40 INJECTION SUBCUTANEOUS at 08:21

## 2022-06-13 RX ADMIN — HYDROMORPHONE HYDROCHLORIDE PRN MG: 1 INJECTION, SOLUTION INTRAMUSCULAR; INTRAVENOUS; SUBCUTANEOUS at 16:09

## 2022-06-13 RX ADMIN — HYDROMORPHONE HYDROCHLORIDE PRN MG: 1 INJECTION, SOLUTION INTRAMUSCULAR; INTRAVENOUS; SUBCUTANEOUS at 13:08

## 2022-06-13 RX ADMIN — HYDROMORPHONE HYDROCHLORIDE PRN MG: 1 INJECTION, SOLUTION INTRAMUSCULAR; INTRAVENOUS; SUBCUTANEOUS at 19:36

## 2022-06-13 NOTE — P.PN
Subjective


Progress Note Date: 06/13/22


This is a 62-year-old gentleman with past medical history of metastatic small 

cell lung CA, last treatment approximately 3 weeks ago, admitted with "constant 

sharp abdominal pain, unchanged since admission, nontender to palpation.  

Requiring Dilaudid IV push every 3 hours but states it only is lasting him two. 

Currently not on any oral opioids .Diarrhea by surgery with no surgical 

intervention recommended.  X-ray of abdomen reported fecal stasis, patient 

reports having bowel movements, last one yesterday.  Increased appetite, 

tolerating well with no nausea or vomiting.  Blood sugars controlled.   Denies 

chest pain, palpitations or shortness of breath.  








Objective





- Vital Signs


Vital signs: 


                                   Vital Signs











Temp  98.9 F   06/13/22 05:00


 


Pulse  80   06/13/22 11:50


 


Resp  16   06/12/22 21:00


 


BP  113/70   06/13/22 05:00


 


Pulse Ox  96   06/13/22 05:00


 


FiO2      








                                 Intake & Output











 06/12/22 06/13/22 06/13/22





 18:59 06:59 18:59


 


Intake Total 360 590 


 


Balance 360 590 


 


Intake:   


 


  Oral 360 590 


 


Other:   


 


  Voiding Method Toilet Toilet Toilet


 


  # Voids 4 2 














- Exam


PHYSICAL EXAM:


VITAL SIGNS: As above


GENERAL: Alert and oriented 3, Sitting up at side of bed, no acute distress


HEENT:  Conjunctivae normal. eyes normal. 


NECK:  No JVD. No thyroid enlargement. No LNs


CARDIOVASCULAR:  S1, S2 regular. No murmur


RESPIRATION: Breath sounds diminished in the bases. No rhonchi or crackles. No 

bronchial breathing.


ABDOMEN:  Soft,  nontender . No guarding. no masses palpable. Bowel sounds 

heard.


LEGS:  No edema. no swelling 


NERVOUS SYSTEM: Cranial N 2-12 grossly normal. Moves all 4 limbs. No focal 

deficits.  Strength and sensation grossly intact.


Skin: Warm and dry, no rash 














- Labs


CBC & Chem 7: 


                                 06/12/22 06:04





                                 06/12/22 14:17


Labs: 


                  Abnormal Lab Results - Last 24 Hours (Table)











  06/12/22 06/12/22 06/12/22 Range/Units





  14:17 17:01 20:36 


 


Potassium  3.2 L    (3.5-5.1)  mmol/L


 


POC Glucose (mg/dL)   102 H  112 H  (75-99)  mg/dL














  06/13/22 06/13/22 Range/Units





  07:21 11:37 


 


Potassium    (3.5-5.1)  mmol/L


 


POC Glucose (mg/dL)  116 H  112 H  (75-99)  mg/dL














Assessment and Plan


Assessment: 


Abdominal pain.  CT reports progressive metastatic cardiophrenic and upper 

abdominal nodules, lymph nodes in a patient with a gastric tumor.  Enlarged ad

renal nodule per oncology's review of CT-not contributing to patient's acute 

abdominal pain.





Small cell lung CA





Diabetes mellitus type 2





Hyponatremia, hypovolemic





Thrombocytopenia





Gastroesophageal reflux disease





COPD, stable





Anxiety





Depression





Former nicotine dependence














Plan: Continue on current medication regime ,monitoring and symptomatic 

treatment. Oral opioids added to pain management- Percocet. CBC, potassium 

magnesium level ordered/pending .Discharge planning in progress for tomorrow, 

pending oncology DC recommendations including pain management and clearance.











The impression and plan of care has been dictated as directed.





:


I performed a history and examination of this patient,  discussed the same with 

the dictator.  I agree with the dictator's note ,documented as a scribe.  Any 

additional findings or plans will be noted.

## 2022-06-13 NOTE — P.PN
Subjective


Progress Note Date: 06/13/22


Principal diagnosis: 





ITP





Patient sitting on side of bed, states he feels ok. 





Objective





- Vital Signs


Vital signs: 


                                   Vital Signs











Temp  98.4 F   06/13/22 12:22


 


Pulse  106 H  06/13/22 12:22


 


Resp  16   06/13/22 12:22


 


BP  110/61   06/13/22 12:22


 


Pulse Ox  99   06/13/22 12:22


 


FiO2      








                                 Intake & Output











 06/12/22 06/13/22 06/13/22





 18:59 06:59 18:59


 


Intake Total 360 590 


 


Balance 360 590 


 


Intake:   


 


  Oral 360 590 


 


Other:   


 


  Voiding Method Toilet Toilet Toilet


 


  # Voids 4 2 














- Exam





 Constitutional


General appearance: average body habitus, cooperative, no acute distress





- EENT


Eyes: anicteric sclerae, EOMI


ENT: hearing grossly normal, normal oropharynx





- Neck


Neck: no lymphadenopathy





- Respiratory


Respiratory: bilateral: CTA, diminished





- Cardiovascular


Rhythm: regular


Heart sounds: normal: S1, S2


Abnormal Heart Sounds: no systolic murmur, no diastolic murmur, no rub, no S3 

Gallop, no S4 Gallop, no click, no other


  ** leg


Peripheral Edema: bilateral: None





- Gastrointestinal


General gastrointestinal: no absent bowel sounds, decreased bowel sounds, no 

distended, no hepatomegaly, no hyperactive bowel sounds, no normal bowel sounds,

no organomegaly, no rigid, no scaphoid, soft, no splenomegaly, no tenderness, no

umbilical hernia, no ventral hernia





- Integumentary


Integumentary: pale





- Neurologic


Neurologic: CNII-XII intact





- Musculoskeletal


Musculoskeletal: strength equal bilaterally





- Labs


CBC & Chem 7: 


                                 06/13/22 13:00





                                 06/13/22 13:00


Labs: 


                  Abnormal Lab Results - Last 24 Hours (Table)











  06/12/22 06/12/22 06/13/22 Range/Units





  17:01 20:36 07:21 


 


RBC     (4.30-5.90)  m/uL


 


Hgb     (13.0-17.5)  gm/dL


 


Hct     (39.0-53.0)  %


 


MCV     (80.0-100.0)  fL


 


MCH     (25.0-35.0)  pg


 


RDW     (11.5-15.5)  %


 


Plt Count     (150-450)  k/uL


 


Macrocytosis     


 


POC Glucose (mg/dL)  102 H  112 H  116 H  (75-99)  mg/dL














  06/13/22 06/13/22 Range/Units





  11:37 13:00 


 


RBC   2.63 L  (4.30-5.90)  m/uL


 


Hgb   9.3 L  (13.0-17.5)  gm/dL


 


Hct   28.8 L  (39.0-53.0)  %


 


MCV   109.6 H  (80.0-100.0)  fL


 


MCH   35.5 H  (25.0-35.0)  pg


 


RDW   18.1 H  (11.5-15.5)  %


 


Plt Count   72 L  (150-450)  k/uL


 


Macrocytosis   Marked A  


 


POC Glucose (mg/dL)  112 H   (75-99)  mg/dL














Assessment and Plan


Plan: 





CT scan - abdomen: report reviewed


CT scan - pelvis: report reviewed





Assessment and Plan


(1) Abdominal pain


Current Visit: Yes   Status: Acute   Priority: High   Code(s): R10.9 - 

UNSPECIFIED ABDOMINAL PAIN   SNOMED Code(s): 52410689


    - Compoenent of constipation but ensure no evidence of obstruction or 

ischemic bowel





(2) Hyperglycemia


Current Visit: Yes   Status: Acute   Priority: High   Code(s): R73.9 - 

HYPERGLYCEMIA, UNSPECIFIED   SNOMED Code(s): 08148917


   





(3) Small cell lung cancer


Current Visit: No   Status: Chronic   Priority: Medium   Code(s): C34.90 - 

MALIGNANT NEOPLASM OF UNSP PART OF UNSP BRONCHUS OR LUNG   SNOMED Code(s): 

362373295


   


Plan: 


Dr. Freire reviewed CT of the abdomen and pelvis with patient yesterday evening.  

There is an adrenal nodule that is enlarged but not felt to be contributing to 

patient's acute situation.  


Patient has been using a very excessive amount of PPIs, changed to IV PPI and 

Carafateper Dr. Freirebefore meals and at bedtime.





With Excessive PPI use must consider bacterial overgrowth and/or fungal 

compoenent - EGD maybe beneficial





Consulted Surgeon to evaluate patient, concerns for maybe in early onset 

ischemic bowel?  Pending their evaluation and recommendations as they have not 

seen at his time yet. .





Patient is actually been doing pretty well on this most recent treatment for 

small cell lung cancer.  Pending recovery from his current situation, treatment 

will be rescheduled.





Xray of abdomene with fecal status:


 - Bowel regimen


 - Reglan (?)


 - Surgery has evaluated and do not feel he is a surgical candidate at this time





Qill continue supportive care and await recovery to resume treatment for SCLCA

## 2022-06-13 NOTE — P.PN
Progress Note - Text


Progress Note Date: 06/13/22


Patient is resting in his bed comfortably.  He denies any significant abdominal 

pain.  He is tolerating diet.





On exam vital signs are still.  Abdomen soft.





Patient will continue receive supportive care.  No surgical intervention is 

planned.

## 2022-06-13 NOTE — P.PN
Subjective


Progress Note Date: 06/12/22


Principal diagnosis: 





Abdominal pain likely due to interval progression of the metastatic nodules.


New metastatic nodular lesion to the left adrenal gland unlikely the cause of 

pain.


Lactic acidosis


Hyperglycemia with uncontrolled diabetes type 2 non-insulin-dependent


Hypovolemic hyponatremia/pseudohyponatremia


Thrombocytopenia








 62-year-old male metastatic Lung cancer, COPD, GERD, diabetes type 2 non 

insulin-dependent, anxiety/depression and previous history of smoking presents 

to ER with complaints of abdominal pain mainly in the mid abdomen and bloating 

and distention.  Patient has been having symptoms since the yesterday.  However 

his patient did have a small bowel movement yesterday.  Denies any nausea or 

vomiting.  No fever no chills.  No cough or sputum.  No chest pain or shortness 

of breath.  CT of the abdomen pelvis CT abdomen pelvis showed known gastric 

lesion is not appreciated by the CT scan.  No evidence of small or large bowel 

obstruction.  Progressive metastatic pericardiophrenic and upper abdominal 

nodules and lymph nodes present.  Interval progression of the metastatic nodule 

adjacent to the left adrenal gland measuring 3 cm compared to 1.8 cm 

previously.Patient is currently on chemotherapy and last dose was 2 weeks ago.  

Patient is supposed to get chemotherapy today.





Laboratory data showed WBC 7.9 hemoglobin 10.7 and platelets 70


Sodium 127 potassium 4.0 chloride 97 bicarb is 20 BUN 11 and creatinine 0.57 and

lactic acid 2.2 blood sugars 412





06/11/2022


Patient continues to have complaints of back pain and some mild epigastric pain.

 He is tolerating clear liquids.


Vital signs are reviewed and reveal temperature 90.8, pulse 1:15, respiration 18

and blood pressure 112/61 and O2 saturation of 96% on


On exam vitals are stable.  Abdomen soft.


Metastatic cancer multiple rhytides.  Patient will continue to have supportive 

care.  No surgical intervention is planned.  He is tolerating his diet 

currently.








6/12/22


Patient is currently sitting in a chair.  Awake alert oriented 3.  No 

compressive chest pain or shortness of breath.  Abdominal pain is controlled 

with medications.  No fever no chills.  No other acute overnight issues


Patient is tolerating oral diet.  Anticipate discharge next 24 hours.


Laboratory data showed WBC 7.1 hemoglobin 9.1 and platelets 80


Sodium 132 potassium 3.1 chloride 98 bicarb is 23.3 BUN 5 and creatinine 0.6 

blood sugar is 122


Current medications reviewed.





Objective





- Vital Signs


Vital signs: 


                                   Vital Signs











Temp  98.4 F   06/12/22 11:37


 


Pulse  104 H  06/12/22 12:02


 


Resp  18   06/12/22 11:37


 


BP  141/67   06/12/22 11:37


 


Pulse Ox  95   06/12/22 04:32


 


FiO2      








                                 Intake & Output











 06/11/22 06/12/22 06/12/22





 18:59 06:59 18:59


 


Intake Total 900 500 


 


Balance 900 500 


 


Intake:   


 


  Intake, IV Titration 900  





  Amount   


 


    Sodium Chloride 0.9% 1, 900  





    000 ml @ 75 mls/hr IV .   





    [id number]   


 


  Oral  500 


 


Other:   


 


  Voiding Method  Toilet Toilet


 


  # Voids  4 














- Labs


CBC & Chem 7: 


                                 06/12/22 06:04





                                 06/12/22 14:17


Labs: 


                  Abnormal Lab Results - Last 24 Hours (Table)











  06/11/22 06/11/22 06/12/22 Range/Units





  17:13 21:02 06:04 


 


RBC    2.61 L  (4.40-5.60)  X 10*6/uL


 


Hgb    9.1 L  (13.0-17.0)  g/dL


 


Hct    27.5 L  (39.6-50.0)  %


 


MCV    105.4 H  (80.0-97.0)  fL


 


MCH    34.9 H  (27.0-32.0)  pg


 


RDW    19.2 H  (11.5-14.5)  %


 


Plt Count    80 L  (140-440)  X 10*3/uL


 


Plt Count Comment    DECREASED A  


 


Sodium     (135-145)  mmol/L


 


Potassium     (3.5-5.5)  mmol/L


 


BUN     (9.0-27.0)  mg/dL


 


BUN/Creatinine Ratio     (12.00-20.00)  Ratio


 


Glucose     ()  mg/dL


 


POC Glucose (mg/dL)  107 H  129 H   (75-99)  mg/dL


 


Calcium     (8.7-10.3)  mg/dL














  06/12/22 06/12/22 06/12/22 Range/Units





  06:04 07:03 14:17 


 


RBC     (4.40-5.60)  X 10*6/uL


 


Hgb     (13.0-17.0)  g/dL


 


Hct     (39.6-50.0)  %


 


MCV     (80.0-97.0)  fL


 


MCH     (27.0-32.0)  pg


 


RDW     (11.5-14.5)  %


 


Plt Count     (140-440)  X 10*3/uL


 


Plt Count Comment     


 


Sodium  132 L    (135-145)  mmol/L


 


Potassium  3.1 L   3.2 L  (3.5-5.5)  mmol/L


 


BUN  5.0 L    (9.0-27.0)  mg/dL


 


BUN/Creatinine Ratio  8.31 L    (12.00-20.00)  Ratio


 


Glucose  122 H    ()  mg/dL


 


POC Glucose (mg/dL)   131 H   (75-99)  mg/dL


 


Calcium  8.3 L    (8.7-10.3)  mg/dL














Assessment and Plan


Assessment: 





Abdominal pain due to interval progression of the metastatic nodules.  

Controlledwith pain medications.


New metastatic nodular lesion to the left adrenal gland unlikely the cause of 

pain.


Lactic acidosis


Hyperglycemia with uncontrolled diabetes type 2 non-insulin-dependent


Hypovolemic hyponatremia/pseudohyponatremia


Thrombocytopenia


COPD not in exacerbation


GERD


Anxiety/depression


Previous history of smoking





Plan:


Patient will be current on IV hydration and pain management with Dilaudid IV.  

Continue with insulin sliding scale and will add long-acting insulin as needed. 

Replace electrolyte.


Patient is also on hydrocortisone at home.  Oncology was consulted and follow-up

closely.  Continue with supportive care.  Prognosis is guarded at this time.

## 2022-06-14 VITALS — DIASTOLIC BLOOD PRESSURE: 54 MMHG | TEMPERATURE: 98 F | SYSTOLIC BLOOD PRESSURE: 103 MMHG | HEART RATE: 106 BPM

## 2022-06-14 LAB
ANION GAP SERPL CALC-SCNC: 11.5 MMOL/L (ref 10–18)
BUN SERPL-SCNC: 2.4 MG/DL (ref 9–27)
BUN/CREAT SERPL: 4 RATIO (ref 12–20)
CALCIUM SPEC-MCNC: 8 MG/DL (ref 8.7–10.3)
CHLORIDE SERPL-SCNC: 102 MMOL/L (ref 96–109)
CO2 SERPL-SCNC: 22.5 MMOL/L (ref 20–27.5)
GLUCOSE BLD-MCNC: 138 MG/DL (ref 75–99)
GLUCOSE BLD-MCNC: 70 MG/DL (ref 75–99)
GLUCOSE SERPL-MCNC: 128 MG/DL (ref 70–110)
MAGNESIUM SPEC-SCNC: 2 MG/DL (ref 1.5–2.4)
POTASSIUM SERPL-SCNC: 3.8 MMOL/L (ref 3.5–5.5)
SODIUM SERPL-SCNC: 136 MMOL/L (ref 135–145)

## 2022-06-14 RX ADMIN — ENOXAPARIN SODIUM SCH MG: 40 INJECTION SUBCUTANEOUS at 08:37

## 2022-06-14 RX ADMIN — INSULIN ASPART SCH UNIT: 100 INJECTION, SOLUTION INTRAVENOUS; SUBCUTANEOUS at 08:35

## 2022-06-14 RX ADMIN — SUCRALFATE SCH GM: 1 TABLET ORAL at 12:09

## 2022-06-14 RX ADMIN — IPRATROPIUM BROMIDE AND ALBUTEROL SULFATE SCH ML: .5; 3 SOLUTION RESPIRATORY (INHALATION) at 08:20

## 2022-06-14 RX ADMIN — PANTOPRAZOLE SODIUM SCH MG: 40 INJECTION, POWDER, FOR SOLUTION INTRAVENOUS at 08:37

## 2022-06-14 RX ADMIN — IPRATROPIUM BROMIDE AND ALBUTEROL SULFATE SCH ML: .5; 3 SOLUTION RESPIRATORY (INHALATION) at 11:35

## 2022-06-14 RX ADMIN — HYDROMORPHONE HYDROCHLORIDE PRN MG: 1 INJECTION, SOLUTION INTRAMUSCULAR; INTRAVENOUS; SUBCUTANEOUS at 01:09

## 2022-06-14 RX ADMIN — ATORVASTATIN CALCIUM SCH MG: 20 TABLET, FILM COATED ORAL at 08:36

## 2022-06-14 RX ADMIN — SUCRALFATE SCH GM: 1 TABLET ORAL at 08:36

## 2022-06-14 RX ADMIN — BUDESONIDE SCH MG: 0.25 SUSPENSION RESPIRATORY (INHALATION) at 08:20

## 2022-06-14 RX ADMIN — HYDROMORPHONE HYDROCHLORIDE PRN MG: 1 INJECTION, SOLUTION INTRAMUSCULAR; INTRAVENOUS; SUBCUTANEOUS at 08:36

## 2022-06-14 RX ADMIN — DOCUSATE SODIUM AND SENNOSIDES SCH: 50; 8.6 TABLET ORAL at 08:38

## 2022-06-14 RX ADMIN — INSULIN ASPART SCH: 100 INJECTION, SOLUTION INTRAVENOUS; SUBCUTANEOUS at 12:08

## 2022-06-14 RX ADMIN — HYDROMORPHONE HYDROCHLORIDE PRN MG: 1 INJECTION, SOLUTION INTRAMUSCULAR; INTRAVENOUS; SUBCUTANEOUS at 04:19

## 2022-06-14 RX ADMIN — CEFAZOLIN SCH MLS/HR: 330 INJECTION, POWDER, FOR SOLUTION INTRAMUSCULAR; INTRAVENOUS at 05:58

## 2022-06-14 NOTE — P.PN
Progress Note - Text


Progress Note Date: 06/14/22





Patient feels better today.  He does have some back pain.  He is requesting 

Morty was placed on a regular diet.





On exam vital signs are stable.  Abdomen soft.





Patient will have his diet advanced to regular diet.  No surgical intervention 

is planned.

## 2022-06-15 LAB
LDH SERPL P TO L-CCNC: 202 U/L (ref 120–250)
LDH1 CFR SERPL ELPH: 24 % (ref 19–38)
LDH2 CFR SERPL ELPH: 37 % (ref 30–43)
LDH3 CFR SERPL ELPH: 21 % (ref 16–26)
LDH4 CFR SERPL ELPH: 8 % (ref 3–12)
LDH5 CFR SERPL ELPH: 10 % (ref 3–14)

## 2022-06-15 NOTE — P.DS
Providers


Date of admission: 


06/09/22 09:01





Expected date of discharge: 06/14/22


Attending physician: 


Brenden Ortiz MD





Consults: 





                                        





06/09/22 09:09


Consult Physician Urgent 


   Consulting Provider: Morales Freire


   Consult Reason/Comments: Established patient


   Do you want consulting provider notified?: Yes





06/09/22 21:02


Consult Physician Routine 


   Consulting Provider: Mina Mcbride


   Consult Reason/Comments: abd pain, on chemo, concern for bowel ischemia


   Do you want consulting provider notified?: Yes, Notify in am











Primary care physician: 


Rosa Ortiz





[facility] Course: 


Final Diagnoses:





Abdominal pain.  CT reports progressive metastatic cardiophrenic and upper 

abdominal nodules, lymph nodes in a patient with a gastric tumor.  Enlarged 

adrenal nodule per oncology's review of CT-not contributing to patient's acute 

abdominal pain.





Right hand cellulitis, secondary to infiltrated IV





Small cell lung CA





Diabetes mellitus type 2





Hyponatremia, hypovolemic





Thrombocytopenia





Gastroesophageal reflux disease





COPD, stable





Anxiety





Depression





Former nicotine dependence














Hospital course:This is a 62-year-old gentleman with past medical history of 

metastatic small cell lung CA, last treatment approximately 3 weeks ago, 

admitted with "constant sharp abdominal pain, unchanged since admission, 

nontender to palpation.  Requiring Dilaudid IV push every 3 hours but states it 

only is lasting him two.  Currently not on any oral opioids .Diarrhea by surgery

with no surgical intervention recommended.  X-ray of abdomen reported fecal 

stasis, patient reports having bowel movements, last one yesterday.  Increased 

appetite, tolerating well with no nausea or vomiting.  Blood sugars controlled. 

 Denies chest pain, palpitations or shortness of breath.  








Percocet added to med regimen yesterday in preparation for planning for oral 

pain management and discharge today.  Patient reports tried one last night at 

bedtime, didn't work as well as the Dilaudid IV and continued on Dilaudid IV 

push throughout the night into this morning.  Percocet dose will be increased.  

Positive bowel movement this morning.  Denies nausea or vomiting.  Pain 

currently controlled.  Patient also presents with a right hand cellulitis from 

IV saline infiltration  and will be discharged home on oral antibiotics 

.Significant clinical improvement.  Patient will be discharged home today in a 

stable condition with guarded prognosis.




















The impression and plan of care has been dictated as directed.





:


I performed a history and examination of this patient,  discussed the same with 

the dictator.  I agree with the dictator's note ,documented as a scribe.  Any 

additional findings or plans will be noted.





























Patient Condition at Discharge: Stable





Plan - Discharge Summary


Discharge Rx Participant: No


New Discharge Prescriptions: 


New


   polyethylene glycoL 3350 [Miralax] 17 gm PO DAILY  packet


   oxyCODONE-APAP 10-325MG [Percocet  mg] 1 each PO Q8H PRN #9 tab


     PRN Reason: Pain


   Sennosides-Docusate Sodium [Senokot-S] 2 each PO BID  tab


   Cephalexin [Keflex] 500 mg PO QID 5 Days #20 cap





Continue


   Sucralfate [Carafate] 1 gm PO QID


   Pantoprazole Sodium [Protonix] 40 mg PO BID


   OXcarbazepine [Trileptal] 300 mg PO BID


   Atorvastatin [Lipitor] 20 mg PO DAILY


   traZODone HCL [Desyrel] 100 mg PO HS


   Ergocalciferol [Vitamin D2 (1250 Mcg = 96792 Iu)] 1,250 mcg PO Q7D


   Ondansetron [Zofran] 4 mg PO Q8HR PRN


     PRN Reason: Nausea


   metFORMIN  mg PO BID


   Famotidine [Pepcid] 40 mg PO DAILY


   Omeprazole [PriLOSEC] 40 mg PO BID


   Hydrocortisone [Cortef] 20 mg PO DAILY


   Budesonide [Pulmicort] 0.25 mg INHALATION RT-BID


   Fluconazole [Diflucan] 100 mg PO DAILY PRN


     PRN Reason: THRUSH


   Albuterol Nebulized [Ventolin Nebulized] 2.5 mg INHALATION RT-QID


   Ipratropium-Albuterol Nebulize [Duoneb 0.5 mg-3 mg/3 ml Soln] 3 ml INHALATION

RT-QID


   ALPRAZolam [Xanax] 0.5 mg PO BID


   Hydrocortisone [Cortef] 10 mg PO W/SUPPER


   Insulin Aspart [NovoLOG Flexpen] See Protocol SQ ACHS





Discontinued


   HYDROcodone/APAP 10-325MG [Norco ] 1 tab PO Q12HR PRN 30 Days #60 tab


     PRN Reason: Pain


Discharge Medication List





Atorvastatin [Lipitor] 20 mg PO DAILY 06/25/21 [History]


OXcarbazepine [Trileptal] 300 mg PO BID 06/25/21 [History]


Pantoprazole Sodium [Protonix] 40 mg PO BID 06/25/21 [History]


Sucralfate [Carafate] 1 gm PO QID 06/25/21 [History]


traZODone HCL [Desyrel] 100 mg PO HS 06/25/21 [History]


ALPRAZolam [Xanax] 0.5 mg PO BID 04/29/22 [History]


Budesonide [Pulmicort] 0.25 mg INHALATION RT-BID 04/29/22 [History]


Ergocalciferol [Vitamin D2 (1250 Mcg = 21372 Iu)] 1,250 mcg PO Q7D 04/29/22 

[History]


Famotidine [Pepcid] 40 mg PO DAILY 04/29/22 [History]


Hydrocortisone [Cortef] 10 mg PO W/SUPPER 04/29/22 [History]


Hydrocortisone [Cortef] 20 mg PO DAILY 04/29/22 [History]


Ipratropium-Albuterol Nebulize [Duoneb 0.5 mg-3 mg/3 ml Soln] 3 ml INHALATION 

RT-QID 04/29/22 [History]


Omeprazole [PriLOSEC] 40 mg PO BID 04/29/22 [History]


Ondansetron [Zofran] 4 mg PO Q8HR PRN 04/29/22 [History]


metFORMIN  mg PO BID 04/29/22 [History]


Albuterol Nebulized [Ventolin Nebulized] 2.5 mg INHALATION RT-QID 06/09/22 

[History]


Fluconazole [Diflucan] 100 mg PO DAILY PRN 06/09/22 [History]


Insulin Aspart [NovoLOG Flexpen] See Protocol SQ ACHS 06/10/22 [History]


Cephalexin [Keflex] 500 mg PO QID 5 Days #20 cap 06/14/22 [Rx]


Sennosides-Docusate Sodium [Senokot-S] 2 each PO BID  tab 06/14/22 [Rx]


oxyCODONE-APAP 10-325MG [Percocet  mg] 1 each PO Q8H PRN #9 tab 06/14/22 

[Rx]


polyethylene glycoL 3350 [Miralax] 17 gm PO DAILY  packet 06/14/22 [Rx]








Follow up Appointment(s)/Referral(s): 


Morales Freire MD [STAFF PHYSICIAN] - 1 Week


(Dr. [place] office will be in contact to arrange for follow-up appt. and next 

chemotherapy.


)


Rosa Ortiz DO [Primary Care Provider] - 06/16/22 2:45 pm (This will be at the 

[place] office.)


Patient Instructions/Handouts:  Cephalexin (By mouth), Oxycodone/Acetaminophen 

(By mouth), Hyponatremia (DC), Type 2 Diabetes in the Older Adult (DC)


Activity/Diet/Wound Care/Special Instructions: 


Right hand, site of prior IV infiltration of saline.  


Minimally raised, reddened with serous drainage, cultured.  


Discharge on Keflex 500 mg 4 times a day 5 days-reevaluate site in clinic at 

follow-up visit with PCP.





Per Dr. Mcbride-Diet as tolerated-Consistent Carbohydrate


Discharge Disposition: HOME SELF-CARE

## 2022-06-20 ENCOUNTER — HOSPITAL ENCOUNTER (OUTPATIENT)
Dept: HOSPITAL 47 - PNWHC3 | Age: 62
Discharge: HOME | End: 2022-06-20
Attending: ANESTHESIOLOGY
Payer: MEDICARE

## 2022-06-20 VITALS
HEART RATE: 109 BPM | TEMPERATURE: 98.1 F | SYSTOLIC BLOOD PRESSURE: 134 MMHG | DIASTOLIC BLOOD PRESSURE: 71 MMHG | RESPIRATION RATE: 18 BRPM

## 2022-06-20 DIAGNOSIS — M51.16: Primary | ICD-10-CM

## 2022-06-20 PROCEDURE — 99211 OFF/OP EST MAY X REQ PHY/QHP: CPT

## 2022-06-20 NOTE — P.PAINPG
PQRS Measure Charge Sheet


Comment: 





HISTORY OF PRESENT ILLNESS:


62 yr old male presents today with severe and chronic LBP secondary to DDD and 

facet arthropathy for evaluation. Pt completed a BL facet block L4-L5, L5-S1 #2 

with 75% pain relief x 2 hrs s/p procedure. Today, he states his LBP is 4/10 in 

intensity, constant, dull & achy in character but escalates to 8/10 in intensity

when standing with change in character to sharp and shooting intensity. Pain is 

relieved with medications (Tylenol OTC, Norco), pain patches but are difficult 

to put on/take off by himself, alternating heat or ice, sitting, repositioning 

and rest. Pt has a history of Lung CA and states the Norco 10/325mg prescribed 

to him at a prior visit is ineffective in managing his pain.





REVIEW OF ORGAN SYSTEMS:


                     CONSTITUTIONAL:  No fevers or chills. No recent weight 

loss.


                     HEENT:  No visual acuity loss, eye pain, difficulties with 

hearing. No nosebleeds.  No difficulty swallowing. 


                     RESPIRATORY:  Denies any troubles with breathing or dyspnea

on exertion. 


                     CARDIOVASCULAR:  Denies any chest pain, palpitations, or 

recent heart attacks. 


                     GASTROINTESTINAL: Denies fatty food intolerance. Has change

in bowel habits and gas bloat.  


                     GENITOURINARY:  Denies any blood in urine.  Has increased 

urinary frequency.  


                     NEUROLOGICAL: +  numbness and tingling along the distal 

extremities. No seizure disorders or headaches.


                     MUSCULOSKELETAL: + back pain 


                     SKIN: No  skin cancer. No rash.


                     PSYCHIATRIC:  Denies current depression or suicidal 

thoughts.


                     ENDOCRINE:  Denies current thyroid disorders. Denies any 

blood sugar glucose intolerance.


                     HEME/LYMPHATIC: Denies any lumps and bumps around the neck.

 History of deep venous thrombosis.


                     ALLERGY/IMMUNOLOGY:  No immunoglobulin therapy. No immune 

deficiencies.


                     BREAST: Denies current breast lumps, pain or nipple 

discharge.


    


   Physical Examinations  :


                Constitutional : Cooperative , not in acute distress .


                HEENT:  Neck  supple. No Lymphadenopathy. Normal  thyroid  size 

.


                                         Eyes  no ptosis , no icterus,  no 

photophobia .  


                                         Hearing intact. Normal  oropharynx.   

No Thrush.  


                Respiratory : Chest clear to auscultations bilaterally. No 

wheezing. No rhonchi.  


                Cardiovascular : Regular rate and rhythm , S1 /  S2.   No  S3 . 

No  S4.


                Gastrointestinal :  Abdomen soft. No tenderness. Bowel sounds x 

4. No organomegaly .


                Genitourinary :   Deferred.                                     

                                                                                

                                                                                

                                                                                

                                                                                

          


                Neurologic :   Cranial nerve II   to  XII  intact. No focal 

neurological deficits.


                Psychiatric : alert & oriented  x 3. Matching mood & appropriate

affect. Judgment & insight intact. 


                Lymphatic  No Lymphadenopathy.


                Musculoskeletal :     


                               Cervical Spine 


                                         Motor strength in the deltoid and 

biceps: Normal  right side. Normal  Left side


                                         Motor strength biceps and the wrist 

extensors:   Normal right side . Normal left side 


                                         Motor strength in the triceps muscle: 

Normal right side.  Normal  left side  


                                         Deep tendon reflexes:  Normal at the 

biceps. Normal at Brachioradialis. Normal at triceps


                                         Cervical facet loading test: positive 

bilaterally


                                         Spurling test: positive bilaterally


                                         Neck distraction test: positive 

bilaterally


                                         Elpidio sign: positive bilaterally


                                  Lumbar spine


                                         Motor strength lower extremities ,thigh

and legs  5/5 Right side ,  5/5  Left side 


                                         Deep tendon reflexes :   Normal  Knee 

Jerk. Normal   Ankle Jerk  


                                         Vertebral body tenderness over 


                                         Lumbar facet Loading Test: positive 

Right  / positive Left  


                                         Range of motion of the lumbar spine  

Flexion  30 degrees,   extension   10 degrees


                                         Straight Leg Raise test: Left/ Right 

positive at   degree   


                                         Mirna test: positive right /  positive 

left.


                                         Severe tenderness over the Sacroiliac 

joint  on the Right / Left  sides   


                                         Gaenslen  test: positive bilaterally


                                         Seated flexion test: positive 

bilaterally.


                                 Sacral spine :


                                         Severe tenderness over the Sacroiliac 

joint:  right side / left side 


                                         Range of motion: Flexion of the lumbar 

spine <60 degrees


                                         Range of motion: Extension of the 

lumbar spine <20 degrees


                                         Gaenslen's Test positive 


                                         Jerry's Test positive 


                                         Mirna test: positive  right side /  

left side


                                         Thigh Thrust Test


                                         Sacral Thrust Test


Assessment/ Plan : 


Lumbar DDD, Lumbar Radiculopathy


Recommendation of BL RFA L4-L5, L5-S1.  Patient experienced sufficient and 

satisfactory pain relief with the prior facet blocks of the medial branches. 

Risks, benefits of procedure discussed and patient verbalized understanding.


Denies aspirin or anti- coagulant use.  Admits to a medical history of diabetes.

 Protocol for discontinuation/continuation of medications adolfo procedure 

discussed.


All questions answered.


Discontinue Norco 10/325mg #90. Prescribe MS ER 15mg BID #60 w 1 refill. New 

opiate agreement signed. Use of medication, side effects, storage, drug 

interactions and what to do in case of emergency discussed. Pt acknowledged 

understanding.


I have spent greater than 50 minutes on patient care today. Dr Cummins was 

available by phone for the evaluation of this patient. The time was used to 

review the medical records including relevant urine studies and Prescription 

history (MAPs), review of the available imaging, evaluation and examination of 

the patient, coordination of care with the medical staff and if applicable 

referring physicians, as well as creation of the medical record








- Pain Location


  ** Bilateral Lower Back


Non-Pharmacological Interventions: Inactivity, Sitting


Pharmacological Interventions: Block, Scheduled Medication


PQRS Narrative: 


                                        





Narcotic Agreement Date Signed   02/07/22


Hx Alcohol Use (MH)              No








Home Medications: 


Ambulatory Orders





Atorvastatin [Lipitor] 20 mg PO DAILY 06/25/21 


OXcarbazepine [Trileptal] 300 mg PO BID 06/25/21 


Pantoprazole Sodium [Protonix] 40 mg PO BID 06/25/21 


Sucralfate [Carafate] 1 gm PO QID 06/25/21 


traZODone HCL [Desyrel] 100 mg PO HS 06/25/21 


ALPRAZolam [Xanax] 0.5 mg PO BID 04/29/22 


Budesonide [Pulmicort] 0.25 mg INHALATION RT-BID 04/29/22 


Ergocalciferol [Vitamin D2 (1250 Mcg = 57765 Iu)] 1,250 mcg PO Q7D 04/29/22 


Famotidine [Pepcid] 40 mg PO DAILY 04/29/22 


Hydrocortisone [Cortef] 10 mg PO W/SUPPER 04/29/22 


Hydrocortisone [Cortef] 20 mg PO DAILY 04/29/22 


Ipratropium-Albuterol Nebulize [Duoneb 0.5 mg-3 mg/3 ml Soln] 3 ml INHALATION 

RT-QID 04/29/22 


Omeprazole [PriLOSEC] 40 mg PO BID 04/29/22 


Ondansetron [Zofran] 4 mg PO Q8HR PRN 04/29/22 


metFORMIN  mg PO BID 04/29/22 


Albuterol Nebulized [Ventolin Nebulized] 2.5 mg INHALATION RT-QID 06/09/22 


Fluconazole [Diflucan] 100 mg PO DAILY PRN 06/09/22 


Insulin Aspart [NovoLOG Flexpen] See Protocol SQ ACHS 06/10/22 


Cephalexin [Keflex] 500 mg PO QID 5 Days #20 cap 06/14/22 


Sennosides-Docusate Sodium [Senokot-S] 2 each PO BID  tab 06/14/22 


oxyCODONE-APAP 10-325MG [Percocet  mg] 1 each PO Q8H PRN #9 tab 06/14/22 


polyethylene glycoL 3350 [Miralax] 17 gm PO DAILY  packet 06/14/22 


Morphine Sulfate ER [Ms Contin] 15 mg PO BID 30 Days #60 tab 06/20/22 


Morphine Sulfate ER [Ms Contin] 15 mg PO Q12HR 30 Days #60 tab 06/20/22 











Controlled Substance Measures





- Controlled Substance Measures


Is patient prescribed a controlled substance at discharge?: Yes


When asked, does pt state using other controlled substances?: Yes


If prescribed controlled substance>3 days was MAPS reviewed?: Yes


If Rx opioid, was Start Talking consent form obtained?: Yes


If opioid is for acute pain is fill amount 7 days or less?: Yes


Was information provided regarding opioid addiction?: Yes

## 2022-07-13 ENCOUNTER — HOSPITAL ENCOUNTER (OUTPATIENT)
Dept: HOSPITAL 47 - RADCTMAIN | Age: 62
Discharge: HOME | End: 2022-07-13
Attending: INTERNAL MEDICINE
Payer: MEDICARE

## 2022-07-13 DIAGNOSIS — Z03.89: Primary | ICD-10-CM

## 2022-07-13 DIAGNOSIS — C34.92: ICD-10-CM

## 2022-07-13 LAB — BUN SERPL-SCNC: 8 MG/DL (ref 9–20)

## 2022-07-13 PROCEDURE — 36415 COLL VENOUS BLD VENIPUNCTURE: CPT

## 2022-07-13 PROCEDURE — 82565 ASSAY OF CREATININE: CPT

## 2022-07-13 PROCEDURE — 84520 ASSAY OF UREA NITROGEN: CPT

## 2022-07-13 PROCEDURE — 71260 CT THORAX DX C+: CPT

## 2022-07-13 PROCEDURE — 74177 CT ABD & PELVIS W/CONTRAST: CPT

## 2022-07-13 NOTE — CT
EXAMINATION TYPE: CT ChestAbdPelvis w con

CT DLP: 1411 mGycm, Automated exposure control for dose reduction was used.

 

DATE OF EXAM: 7/13/2022 5:11 PM

 

COMPARISON: CT chest abdomen pelvis 5/5/2022 and 6/9/2022 CT abdomen pelvis.

 

CLINICAL INDICATION:Male, 62 years old with history of C34.92 lung ca; h/o Lung CA, f/u

 

Technique: Multiple axial images of the chest, abdomen, and pelvis were obtained following the intrav
enous administration of 70 mL Isovue-300. Two-dimensional coronal and sagittal reconstructions were o
btained. 

 

Findings: 

 

CHEST:

LUNGS/ PLEURA: Left perihilar soft tissue/lymphadenopathy measures 25 x 24 mm. There is more distal s
uspected atelectasis changes present extending to the pleural surface. These are not significantly ch
anged from immediate prior. There is severe centrilobular emphysema changes throughout the lungs. Sca
ttered increased reticulation of the lung parenchyma is present

AIRWAY: Patent and unremarkable..

HEART: Size within normal limits. .

Mediastinum: interval decrease in epicardial fat lymph nodes now measuring 7 mm short axis previously
 measuring 9 mm. There is similar versus fractionally increased in size lymph node measuring 17 mm on
 today's exam and 16 mm on prior, this could be within measuring error. AP window lymph node is small
er measuring 16 mm in short axis, previously 18 mm. Layering oral contrast is seen throughout the eso
phagus.

VASCULATURE:  No aortic aneurysm. Atherosclerosis of the arterial vasculature is present.

 

MUSCULOSKELETAL: No acute osseous abnormalities. Right shoulder arthroplasty changes are present. Sub
acute left rib 6 fracture.

SOFT TISSUES/LYMPH NODES: Unremarkable.

LOWER NECK: No significant findings.

 

ABDOMEN:

ABDOMEN

LIVER: Diffusely hypoattenuating parenchyma.

GALLBLADDER AND BILE DUCTS: Unremarkable.

PANCREAS: Unremarkable.

SPLEEN: Unremarkable.

ADRENAL GLANDS: Interval decrease in size of left adrenal mass now measuring 21 x 16 mm, previously 3
0 x 21 mm.

KIDNEYS AND URETERS: No evidence of hydronephrosis or renal calculus. The ureters are unremarkable.  


 

PELVIS 

Evaluation of the pelvis is limited given streak artifact.

BLADDER: Incompletely evaluated due to streak artifact.

REPRODUCTIVE: Incompletely evaluated due to streak artifact.

 

ABDOMEN & PELVIS

STOMACH AND BOWEL: There is a moderate stool burden throughout the colon. No evidence of bowel obstru
ction. 

PERITONEUM: No evidence of pneumoperitoneum or free fluid.

 

VASCULATURE: There is moderate to severe atherosclerosis changes throughout the visualized aorta.

MUSCULOSKELETAL: No acute osseous abnormalities. There are vertebroplasty changes at L1 and T11. Jw
tional multilevel biconcave vertebral body compression deformities are present. Overall the osseous s
tructure findings are not significantly changed from prior examinations.

 

LYMPH NODES: Lymph nodes just superior to the left renal sinus measures 6 mm in short axis which is l
arger than prior where it measured 4 mm.

SOFT TISSUE/ABDOMINAL WALL: Fat containing umbilical hernia.

 

IMPRESSION: 

1.  Persistent left hilar consolidation with suspected atelectasis extending to the lung periphery.

2.  Interval decrease in size of a majority of the lymph nodes seen on prior. One right low paratrach
eal lymph node appears stable to fractionally increased in size. Additional intra-abdominal lymph nod
e just superior to the left renal sinus has increased in size on today's exam. 

3.  Layering debris within the esophagus likely secondary to reflux and/or esophageal dysmotility.

4.  Hepatic steatosis.

5.  Severe COPD

## 2022-07-22 ENCOUNTER — HOSPITAL ENCOUNTER (OUTPATIENT)
Dept: HOSPITAL 47 - ORPAIN | Age: 62
Discharge: HOME | End: 2022-07-22
Attending: ANESTHESIOLOGY
Payer: MEDICARE

## 2022-07-22 VITALS — DIASTOLIC BLOOD PRESSURE: 71 MMHG | SYSTOLIC BLOOD PRESSURE: 126 MMHG | HEART RATE: 106 BPM

## 2022-07-22 VITALS — RESPIRATION RATE: 18 BRPM | TEMPERATURE: 97.8 F

## 2022-07-22 VITALS — BODY MASS INDEX: 29.8 KG/M2

## 2022-07-22 DIAGNOSIS — Z97.2: ICD-10-CM

## 2022-07-22 DIAGNOSIS — E11.9: ICD-10-CM

## 2022-07-22 DIAGNOSIS — F32.A: ICD-10-CM

## 2022-07-22 DIAGNOSIS — M47.817: Primary | ICD-10-CM

## 2022-07-22 DIAGNOSIS — C34.90: ICD-10-CM

## 2022-07-22 DIAGNOSIS — Z79.899: ICD-10-CM

## 2022-07-22 DIAGNOSIS — Z79.4: ICD-10-CM

## 2022-07-22 DIAGNOSIS — F41.9: ICD-10-CM

## 2022-07-22 DIAGNOSIS — J44.9: ICD-10-CM

## 2022-07-22 DIAGNOSIS — Z79.84: ICD-10-CM

## 2022-07-22 DIAGNOSIS — E66.01: ICD-10-CM

## 2022-07-22 LAB — GLUCOSE BLD-MCNC: 140 MG/DL (ref 70–110)

## 2022-07-22 PROCEDURE — 64636 DESTROY L/S FACET JNT ADDL: CPT

## 2022-07-22 PROCEDURE — 64635 DESTROY LUMB/SAC FACET JNT: CPT

## 2022-07-22 NOTE — P.PCN
Date of Procedure: 07/22/22


Surgeon: Christiano Somers


Pathology: none sent


Condition: stable


Disposition: PACU


Description of Procedure: 


PREOPERATIVE DIAGNOSIS: Lumbar spondylosis without myelopathy, morbid obesity


POSTOPERATIVE DIAGNOSIS: Lumbar spondylosis without myelopathy,morbid obesity


PROCEDURES : Bilateral  Radiofrequency thermocoagulation L4-L5, and L5-S1  

medial branch, with fluoroscopic guidance





ANESTHESIA:  IV moderate conscious sedation with versed and fentanyl by the 

anesthesia Department and local infiltration with lidocaine 1% 5 ml 





Physician:Christiano Somers MD





EBL: Minimal





PROCEDURE INDICATION: The patient with low back pain secondary to lumbar facet  

arthropathy who had significant relief of  pain with previous diagnostic lumbar 

medial branch block with Ropivacaine0.5%. 





PROCEDURE DESCRIPTION / TECHNIQUE: The patient was seen and identified in the 

preoperative area. Risks, benefits, complications, including but not limited to 

risk of infection ,bleeding , allergic reactions to the medications and no 

complete pain relief , and alternatives were discussed with the patient, the 

patient agreed to proceed with the procedure and signed the consent. IV was 

started. Vital signs remained stable throughout the procedure.





Patient was taken to the OR and time out was completed. The patient was placed 

in the prone position on the procedure table. The lumber  area was prepped and 

draped in the usual sterile fashion. . Vital signs were closely monitored during

the procedure .IV sedation was used during the procedure to decrease patients 

anxiety. 





The target points were identified as follows: For the L5-S1 level which 

corresponds to the dorsal ramus of L5 the target point was at the superior 

medial aspect of the sacral ala on the Rt side of the spine on the AP view of 

fluoroscopy and for the L3, and L4 medial branches the target points were at the

connection between the transverse process and the superior articular process of 

L4, and L5 vertebra respectively on the Rt oblique view of fluoroscopy. skin was

marked, and localized with 1% lidocaineat these points.  Subsequently, an 18  

[id number] radiofrequency needles with a 10-mm curved  active tips were 

advanced guided by fluoroscopy to each of the target points mentioned above in a

superior medial direction to get the active tips as parallel as possible to the 

medial branches tracks.  AP, oblique, and lateral views of fluoroscopy were used

to verify needle tips position.  Each level then underwent  motor testing at 2.5

 Hz and 0 to 3 volt with local stimulation, but no radicular symptoms down the 

legs.  I then injected 1 mL of lidocaine 1% in each needle before starting   

radiofrequency thermocoagulation  at 80 degrees celsius for 90 seconds.  After 

that I injected  1 ml of PF Ropivacaine 0.5%(3 mls) with 40 mg of Kenalog, 1 mL 

of this mixture was given in each needle before taking the needles out intact.  

Then the left side with the same levels was done in the same manner.            

             





At the end of the procedure, the skin was cleansed and bandages were applied.  A

copy of needle placement fluoroscopy was saved on the C-arm machine.





COMPLICATIONS: No acute complications.





DISPOSITION / PLANS: The patient was placed in a supine position and  

transferred to the recovery area in a stable condition for observation  and was 

discharged from the recovery room after meeting discharge criteria. Home 

discharge instructions given to the patient by the staff. The patient was 

reexamined prior to discharge. The patient will schedule a follow up in the 

clinic in 2-4 weeks.

## 2022-07-22 NOTE — FL
Fluoroscopy

 

HISTORY: Pain

 

29 seconds fluoroscopy time supplied to the referring clinician.  5 intraoperative C-arm images docum
ent the procedure. See dictated report from anesthesia.

## 2022-07-27 ENCOUNTER — HOSPITAL ENCOUNTER (EMERGENCY)
Dept: HOSPITAL 47 - EC | Age: 62
Discharge: HOME | End: 2022-07-27
Payer: MEDICARE

## 2022-07-27 VITALS — RESPIRATION RATE: 18 BRPM

## 2022-07-27 VITALS — DIASTOLIC BLOOD PRESSURE: 60 MMHG | HEART RATE: 103 BPM | TEMPERATURE: 98.2 F | SYSTOLIC BLOOD PRESSURE: 119 MMHG

## 2022-07-27 DIAGNOSIS — Z87.891: ICD-10-CM

## 2022-07-27 DIAGNOSIS — Z20.822: ICD-10-CM

## 2022-07-27 DIAGNOSIS — Z79.899: ICD-10-CM

## 2022-07-27 DIAGNOSIS — J44.9: ICD-10-CM

## 2022-07-27 DIAGNOSIS — C34.90: ICD-10-CM

## 2022-07-27 DIAGNOSIS — Z79.51: ICD-10-CM

## 2022-07-27 DIAGNOSIS — C76.2: Primary | ICD-10-CM

## 2022-07-27 DIAGNOSIS — K21.9: ICD-10-CM

## 2022-07-27 DIAGNOSIS — E87.6: ICD-10-CM

## 2022-07-27 DIAGNOSIS — D53.9: ICD-10-CM

## 2022-07-27 LAB
ALBUMIN SERPL-MCNC: 3.6 G/DL (ref 3.5–5)
ALP SERPL-CCNC: 86 U/L (ref 38–126)
ALT SERPL-CCNC: 26 U/L (ref 4–49)
AMYLASE SERPL-CCNC: 30 U/L (ref 30–110)
ANION GAP SERPL CALC-SCNC: 8 MMOL/L
APTT BLD: 23.8 SEC (ref 22–30)
AST SERPL-CCNC: 24 U/L (ref 17–59)
BASOPHILS # BLD AUTO: 0 K/UL (ref 0–0.2)
BASOPHILS NFR BLD AUTO: 0 %
BUN SERPL-SCNC: 22 MG/DL (ref 9–20)
CALCIUM SPEC-MCNC: 8.5 MG/DL (ref 8.4–10.2)
CHLORIDE SERPL-SCNC: 100 MMOL/L (ref 98–107)
CO2 SERPL-SCNC: 26 MMOL/L (ref 22–30)
EOSINOPHIL # BLD AUTO: 0.1 K/UL (ref 0–0.7)
EOSINOPHIL NFR BLD AUTO: 1 %
ERYTHROCYTE [DISTWIDTH] IN BLOOD BY AUTOMATED COUNT: 2.78 M/UL (ref 4.3–5.9)
ERYTHROCYTE [DISTWIDTH] IN BLOOD: 17.6 % (ref 11.5–15.5)
GLUCOSE SERPL-MCNC: 113 MG/DL (ref 74–99)
HCT VFR BLD AUTO: 30.6 % (ref 39–53)
HGB BLD-MCNC: 10.2 GM/DL (ref 13–17.5)
INR PPP: 1 (ref ?–1.2)
LIPASE SERPL-CCNC: 158 U/L (ref 23–300)
LYMPHOCYTES # SPEC AUTO: 1.1 K/UL (ref 1–4.8)
LYMPHOCYTES NFR SPEC AUTO: 22 %
MCH RBC QN AUTO: 36.7 PG (ref 25–35)
MCHC RBC AUTO-ENTMCNC: 33.4 G/DL (ref 31–37)
MCV RBC AUTO: 109.8 FL (ref 80–100)
MONOCYTES # BLD AUTO: 0.6 K/UL (ref 0–1)
MONOCYTES NFR BLD AUTO: 12 %
NEUTROPHILS # BLD AUTO: 3 K/UL (ref 1.3–7.7)
NEUTROPHILS NFR BLD AUTO: 61 %
PLATELET # BLD AUTO: 75 K/UL (ref 150–450)
POTASSIUM SERPL-SCNC: 3.3 MMOL/L (ref 3.5–5.1)
PROT SERPL-MCNC: 6.6 G/DL (ref 6.3–8.2)
PT BLD: 11.2 SEC (ref 9–12)
SODIUM SERPL-SCNC: 134 MMOL/L (ref 137–145)
WBC # BLD AUTO: 4.9 K/UL (ref 3.8–10.6)

## 2022-07-27 PROCEDURE — 85025 COMPLETE CBC W/AUTO DIFF WBC: CPT

## 2022-07-27 PROCEDURE — 99285 EMERGENCY DEPT VISIT HI MDM: CPT

## 2022-07-27 PROCEDURE — 93005 ELECTROCARDIOGRAM TRACING: CPT

## 2022-07-27 PROCEDURE — 71045 X-RAY EXAM CHEST 1 VIEW: CPT

## 2022-07-27 PROCEDURE — 96361 HYDRATE IV INFUSION ADD-ON: CPT

## 2022-07-27 PROCEDURE — 82150 ASSAY OF AMYLASE: CPT

## 2022-07-27 PROCEDURE — 96374 THER/PROPH/DIAG INJ IV PUSH: CPT

## 2022-07-27 PROCEDURE — 87635 SARS-COV-2 COVID-19 AMP PRB: CPT

## 2022-07-27 PROCEDURE — 96375 TX/PRO/DX INJ NEW DRUG ADDON: CPT

## 2022-07-27 PROCEDURE — 96376 TX/PRO/DX INJ SAME DRUG ADON: CPT

## 2022-07-27 PROCEDURE — 83605 ASSAY OF LACTIC ACID: CPT

## 2022-07-27 PROCEDURE — 80053 COMPREHEN METABOLIC PANEL: CPT

## 2022-07-27 PROCEDURE — 85730 THROMBOPLASTIN TIME PARTIAL: CPT

## 2022-07-27 PROCEDURE — 74177 CT ABD & PELVIS W/CONTRAST: CPT

## 2022-07-27 PROCEDURE — 74018 RADEX ABDOMEN 1 VIEW: CPT

## 2022-07-27 PROCEDURE — 85610 PROTHROMBIN TIME: CPT

## 2022-07-27 PROCEDURE — 83690 ASSAY OF LIPASE: CPT

## 2022-07-27 NOTE — XR
EXAMINATION TYPE: XR KUB

 

DATE OF EXAM: 7/27/2022 4:50 PM

 

INDICATION: 

Patient age:Male;  62 years old; 

Reason for study: abdominal pain; 

 

COMPARISON: Abdominal radiograph 6/10/2022

 

TECHNIQUE: One radiographic view of the abdomen was obtained.

 

FINDINGS: The bowel gas pattern is nonspecific without dilated loops of small or large bowel. There i
s no evidence for organomegaly or pneumoperitoneum.  The osseous structures are intact.  No abnormal 
calcifications are present. Fecal material and gas are demonstrated throughout the colon and rectum. 
Bilateral hip prostheses. There is multilevel disc degeneration changes with vertebroplasty changes.

 

IMPRESSION:

Nonspecific bowel gas pattern without radiographic evidence for acute process.

## 2022-07-27 NOTE — XR
EXAMINATION TYPE: XR chest 1V portable

 

DATE OF EXAM: 7/27/2022 4:50 PM

 

COMPARISON: Chest radiographs from 6/10/2022

 

TECHNIQUE: XR chest 1V portable Portable AP radiograph of the chest.

 

CLINICAL INDICATION:Male, 62 years old with history of abdominal pain; 

 

FINDINGS: 

Lungs/Pleura: Prominent interstitial lung markings are seen scattered throughout the lungs. No eviden
ce of focal consolidation, pneumothorax or pleural effusion. 

Pulmonary vascularity: Unremarkable.

Heart/mediastinum: Cardiomediastinal silhouette is unremarkable.

Musculoskeletal: No acute osseous pathology. Right shoulder arthroplasty changes. Hardware is intact.


 

IMPRESSION: 

Chronic changes without acute pulmonary process. No significant change from prior.

## 2022-07-27 NOTE — ED
General Adult HPI





- General


Chief complaint: Abdominal Pain


Stated complaint: ABD Pain


Time Seen by Provider: 07/27/22 16:25


Source: patient, EMS, RN notes reviewed, old records reviewed


Mode of arrival: EMS


Limitations: no limitations





- History of Present Illness


Initial comments: 





Patient is a 62-year-old male with past medical history remarkable for lung 

cancer currently receiving chemotherapy presents emergency Department 

complaining of 2 day history of diffuse nonspecific sharp abdominal pain.  Does 

not radiate.  Denies nausea, vomiting, diarrhea.  States he still having normal 

bowel movements.  Still tolerating oral intake.  Has a history of gastritis.  

Denies any chest pain, shortness of breath.  His no other acute complaints at 

this time.  Presents emergency department for further evaluation.  Denies any 

fevers, chills, cough.  Uncertain what is causing this pain.





- Related Data


                                Home Medications











 Medication  Instructions  Recorded  Confirmed


 


Atorvastatin [Lipitor] 20 mg PO DAILY 06/25/21 07/27/22


 


OXcarbazepine [Trileptal] 300 mg PO BID 06/25/21 07/27/22


 


Pantoprazole Sodium [Protonix] 40 mg PO BID 06/25/21 07/27/22


 


Sucralfate [Carafate] 1 gm PO QID 06/25/21 07/27/22


 


traZODone HCL [Desyrel] 100 mg PO HS 06/25/21 07/27/22


 


ALPRAZolam [Xanax] 0.5 mg PO BID 04/29/22 07/27/22


 


Budesonide [Pulmicort] 0.25 mg INHALATION RT-BID 04/29/22 07/27/22


 


Hydrocortisone [Cortef] 10 mg PO W/SUPPER 04/29/22 07/27/22


 


Hydrocortisone [Cortef] 20 mg PO DAILY 04/29/22 07/27/22


 


Ipratropium-Albuterol Nebulize 3 ml INHALATION RT-QID 04/29/22 07/27/22





[Duoneb 0.5 mg-3 mg/3 ml Soln]   


 


Ondansetron [Zofran] 4 mg PO Q8HR PRN 04/29/22 07/27/22


 


metFORMIN  mg PO BID 04/29/22 07/27/22


 


Albuterol Nebulized [Ventolin 2.5 mg INHALATION RT-QID 06/09/22 07/27/22





Nebulized]   


 


Fluconazole [Diflucan] 100 mg PO DAILY PRN 06/09/22 07/27/22


 


Insulin Aspart [NovoLOG Flexpen] See Protocol SQ ACHS 06/10/22 07/27/22


 


Famotidine 40 mg PO DAILY 07/27/22 07/27/22


 


Gabapentin [Neurontin] 400 mg PO BID 07/27/22 07/27/22


 


Metoclopramide [Reglan] 5 mg PO AC-BID PRN 07/27/22 07/27/22


 


Semaglutide [Rybelsus] 3 mg PO DAILY 07/27/22 07/27/22


 


oxyCODONE-APAP 10-325MG [Percocet 1 tab PO Q8HR PRN 07/27/22 07/27/22





 mg]   








                                  Previous Rx's











 Medication  Instructions  Recorded


 


polyethylene glycoL 3350 [Miralax] 17 gm PO DAILY  packet 06/14/22


 


Morphine Sulfate ER [Ms Contin] 15 mg PO BID 30 Days #60 tab 06/20/22


 


Dicyclomine [Bentyl] 10 mg PO TID PRN 10 Days #30 07/27/22





 capsule 


 


HYDROcodone/APAP 5-325MG [Norco 1 tab PO Q6HR PRN 3 Days #12 tab 07/27/22





5-325]  











                                    Allergies











Allergy/AdvReac Type Severity Reaction Status Date / Time


 


No Known Allergies Allergy   Verified 07/27/22 17:00














Review of Systems


ROS Statement: 


Those systems with pertinent positive or pertinent negative responses have been 

documented in the HPI.


Review of Systems:


CONST: Denies fever 


EYES: Denies blurry vision 


ENT: Denies nasal congestion  


C/V:  Denies Chest pain


RESP: Denies shortness of breath 


GI: Endorses abdominal pain


: Denies dysuria  


SKIN: Denies rash.


MSK: Denies joint pain.


NEURO: Denies headache 


ROS Other: All systems not noted in ROS Statement are negative.





Past Medical History


Past Medical History: Cancer, COPD, GERD/Reflux


Additional Past Medical History / Comment(s): LUNG CANCER WITH TUMOR IN STOMACH 

AND STATES ALSO IN HIS NECK.,.  RECEIVING CHEMOTHERAPY., DDD WITH BACK ,HIP & 

LEG PAIN., (PAIN CLINIC PATIENT)., EMPHYSEMA., GASTRITIS.


History of Any Multi-Drug Resistant Organisms: None Reported


Past Surgical History: Joint Replacement, Orthopedic Surgery


Additional Past Surgical History / Comment(s): RIGHT AND LEFT TOTAL HIPS,  RIGHT

SHOULDER SURGERY.


Past Anesthesia/Blood Transfusion Reactions: No Reported Reaction


Past Psychological History: Anxiety, Depression


Smoking Status: Former smoker


Past Alcohol Use History: None Reported


Past Drug Use History: None Reported





- Past Family History


  ** Father


Family Medical History: No Reported History





General Exam





- General Exam Comments


Initial Comments: 





General: Appears in no acute distress.


HEAD:  Normal with no signs of head trauma.


EYES:  PERRLA, EOMI, conjunctiva normal, no discharge.


ENT:  Hearing grossly intact, normal oropharynx.


RESPIRATORY:  Clear breath sounds bilaterally.  No wheezes, rales, or rhonchi.  


C/V:  Regular rate and rhythm. S1 and S2 auscultated, no edema, peripheral 

pulses 2+ and intact throughout


ABD: Abdomen soft, nondistended.  Diffusely tender with no focal areas.  No 

guarding.  No peritoneal signs.  No rebound tenderness.


EXT: Normal range of motion, no obvious deformity


SKIN:  No rashes or lesions observed on exposed skin.


NEURO: Alert and oriented 4.  No focal deficits.


Limitations: no limitations





Course


                                   Vital Signs











  07/27/22 07/27/22 07/27/22





  15:50 18:15 19:52


 


Temperature 98.1 F  98.2 F


 


Pulse Rate 110 H 105 H 103 H


 


Respiratory 20 18 18





Rate   


 


Blood Pressure 165/84 133/69 119/60


 


O2 Sat by Pulse 98 97 97





Oximetry   














Medical Decision Making





- Medical Decision Making





Based on the patient's presentation and physical exam, I'm concerned for acute 

intra-abdominal process for his current symptoms.  We'll obtain abdominal 

laboratory studies, screening EKG, as well as abdominal imaging.  We will be 

symptomatically treated with IV pain medications, GI cocktail, 1 L fluid bolus. 

He was in agreement this plan.





Laboratory studies remarkable for a macrocytic anemia with hemoglobin of 10.2 

which is chronic for the patient.  Patient also has some lymphocytopenia with a 

platelet count 75 which is also chronic for the patient.  Both likely related to

his current chemoradiation.  Patient is slightly hypokalemic at 3.3, Covid is 

negative.  Urine is still pending.  Chest and abdominal x-ray revealed no acute 

process.  Abdominal and pelvis CT was obtained as well.  It shows possibly 

worsening metastatic cancer disease.  The lymphadenopathy seems to be progressed

versus CAT scan from last month.





I discussed the findings with the patient.  I discussed discharged home with his

oncology follow-up or admission for pain control, as his symptoms seem chronic, 

likely secondary to his progressing disease..  I believe it is safe for him to 

go home and he was in agreement this plan.  Will be sent home with Norco 5.  He 

was in agreement this plan.  We'll follow up with his oncologist this week.





I will provide the patient with a prescription for Norco 5. I instructed the 

patient to follow up with their PCP in the next 1-3 days.  I explained that the 

patient should return to the emergency department if they experience any 

worsening symptoms. Strict return precautions were discussed with the patient. 

The patient expressed understanding of these instructions. I answered all 

questions that the patient had. The patient was discharged home in fair 

condition with their prescriptions and follow up information.





- Lab Data


Result diagrams: 


                                 07/27/22 16:43





                                 07/27/22 16:43


                                   Lab Results











  07/27/22 07/27/22 07/27/22 Range/Units





  16:43 16:43 16:43 


 


WBC  4.9    (3.8-10.6)  k/uL


 


RBC  2.78 L    (4.30-5.90)  m/uL


 


Hgb  10.2 L    (13.0-17.5)  gm/dL


 


Hct  30.6 L    (39.0-53.0)  %


 


MCV  109.8 H    (80.0-100.0)  fL


 


MCH  36.7 H    (25.0-35.0)  pg


 


MCHC  33.4    (31.0-37.0)  g/dL


 


RDW  17.6 H    (11.5-15.5)  %


 


Plt Count  75 L D    (150-450)  k/uL


 


MPV  9.3    


 


Neutrophils %  61    %


 


Lymphocytes %  22    %


 


Monocytes %  12    %


 


Eosinophils %  1    %


 


Basophils %  0    %


 


Neutrophils #  3.0    (1.3-7.7)  k/uL


 


Lymphocytes #  1.1    (1.0-4.8)  k/uL


 


Monocytes #  0.6    (0-1.0)  k/uL


 


Eosinophils #  0.1    (0-0.7)  k/uL


 


Basophils #  0.0    (0-0.2)  k/uL


 


Manual Slide Review  Performed    


 


Polychromasia  Present    


 


Hypochromasia  Slight    


 


Poikilocytosis  Slight    


 


Anisocytosis  Slight    


 


Macrocytosis  Marked A    


 


PT   11.2   (9.0-12.0)  sec


 


INR   1.0   (<1.2)  


 


APTT   23.8   (22.0-30.0)  sec


 


Sodium    134 L  (137-145)  mmol/L


 


Potassium    3.3 L  (3.5-5.1)  mmol/L


 


Chloride    100  ()  mmol/L


 


Carbon Dioxide    26  (22-30)  mmol/L


 


Anion Gap    8  mmol/L


 


BUN    22 H  (9-20)  mg/dL


 


Creatinine    0.63 L  (0.66-1.25)  mg/dL


 


Est GFR (CKD-EPI)AfAm    >90  (>60 ml/min/1.73 sqM)  


 


Est GFR (CKD-EPI)NonAf    >90  (>60 ml/min/1.73 sqM)  


 


Glucose    113 H  (74-99)  mg/dL


 


Plasma Lactic Acid Santo     (0.7-2.0)  mmol/L


 


Calcium    8.5  (8.4-10.2)  mg/dL


 


Total Bilirubin    0.8  (0.2-1.3)  mg/dL


 


AST    24  (17-59)  U/L


 


ALT    26  (4-49)  U/L


 


Alkaline Phosphatase    86  ()  U/L


 


Total Protein    6.6  (6.3-8.2)  g/dL


 


Albumin    3.6  (3.5-5.0)  g/dL


 


Amylase    30  ()  U/L


 


Lipase    158  ()  U/L


 


Coronavirus (PCR)     (Not Detectd)  














  07/27/22 07/27/22 Range/Units





  16:43 17:24 


 


WBC    (3.8-10.6)  k/uL


 


RBC    (4.30-5.90)  m/uL


 


Hgb    (13.0-17.5)  gm/dL


 


Hct    (39.0-53.0)  %


 


MCV    (80.0-100.0)  fL


 


MCH    (25.0-35.0)  pg


 


MCHC    (31.0-37.0)  g/dL


 


RDW    (11.5-15.5)  %


 


Plt Count    (150-450)  k/uL


 


MPV    


 


Neutrophils %    %


 


Lymphocytes %    %


 


Monocytes %    %


 


Eosinophils %    %


 


Basophils %    %


 


Neutrophils #    (1.3-7.7)  k/uL


 


Lymphocytes #    (1.0-4.8)  k/uL


 


Monocytes #    (0-1.0)  k/uL


 


Eosinophils #    (0-0.7)  k/uL


 


Basophils #    (0-0.2)  k/uL


 


Manual Slide Review    


 


Polychromasia    


 


Hypochromasia    


 


Poikilocytosis    


 


Anisocytosis    


 


Macrocytosis    


 


PT    (9.0-12.0)  sec


 


INR    (<1.2)  


 


APTT    (22.0-30.0)  sec


 


Sodium    (137-145)  mmol/L


 


Potassium    (3.5-5.1)  mmol/L


 


Chloride    ()  mmol/L


 


Carbon Dioxide    (22-30)  mmol/L


 


Anion Gap    mmol/L


 


BUN    (9-20)  mg/dL


 


Creatinine    (0.66-1.25)  mg/dL


 


Est GFR (CKD-EPI)AfAm    (>60 ml/min/1.73 sqM)  


 


Est GFR (CKD-EPI)NonAf    (>60 ml/min/1.73 sqM)  


 


Glucose    (74-99)  mg/dL


 


Plasma Lactic Acid Santo  1.6   (0.7-2.0)  mmol/L


 


Calcium    (8.4-10.2)  mg/dL


 


Total Bilirubin    (0.2-1.3)  mg/dL


 


AST    (17-59)  U/L


 


ALT    (4-49)  U/L


 


Alkaline Phosphatase    ()  U/L


 


Total Protein    (6.3-8.2)  g/dL


 


Albumin    (3.5-5.0)  g/dL


 


Amylase    ()  U/L


 


Lipase    ()  U/L


 


Coronavirus (PCR)   Not Detected  (Not Detectd)  














- EKG Data


-: EKG Interpreted by Me


EKG Comments: 





12-lead Electrocardiogram Interpretation Note





EKG was reviewed and interpreted by myself. 12-lead ECG performed at 1711 is 

interpreted by me as revealing sinus tachycardia at a rate of 111 beats per 

minute.  Axis is normal.  CO interval is 131 ms, QRS duration is 82 ms, QTc is 

416 ms..  There were no ST or T wave abnormalities to suggest myocardial 

ischemia or injury. R wave progression across the precordium was satisfactory. 

By my interpretation this EKG is non-diagnostic for acute ischemia.





Disposition


Clinical Impression: 


 Abdominal pain of unknown cause, Metastatic cancer, Chronic abdominal pain





Disposition: HOME SELF-CARE


Condition: Fair


Instructions (If sedation given, give patient instructions):  Abdominal Pain 

(ED)


Prescriptions: 


Dicyclomine [Bentyl] 10 mg PO TID PRN 10 Days #30 capsule


 PRN Reason: Pain


HYDROcodone/APAP 5-325MG [Norco 5-325] 1 tab PO Q6HR PRN 3 Days #12 tab


 PRN Reason: Pain


Is patient prescribed a controlled substance at d/c from ED?: Yes


When asked, does pt state using other controlled substances?: No


If prescribed controlled substance>3 days was MAPS reviewed?: Prescribed <3 Days


If opioid is for acute pain is fill amount 7 days or less?: Yes


If Rx opioid, was Start Talking consent form obtained?: Yes


Referrals: 


Rosa Ortiz DO [Primary Care Provider] - 1-2 days


Time of Disposition: 19:00

## 2022-08-03 ENCOUNTER — HOSPITAL ENCOUNTER (OUTPATIENT)
Dept: HOSPITAL 47 - PNWHC3 | Age: 62
Discharge: HOME | End: 2022-08-03
Attending: ANESTHESIOLOGY
Payer: MEDICARE

## 2022-08-03 VITALS
RESPIRATION RATE: 18 BRPM | DIASTOLIC BLOOD PRESSURE: 78 MMHG | HEART RATE: 109 BPM | SYSTOLIC BLOOD PRESSURE: 123 MMHG | TEMPERATURE: 97.7 F

## 2022-08-03 DIAGNOSIS — M47.896: Primary | ICD-10-CM

## 2022-08-03 DIAGNOSIS — M51.36: ICD-10-CM

## 2022-08-03 PROCEDURE — 99211 OFF/OP EST MAY X REQ PHY/QHP: CPT

## 2022-08-08 NOTE — P.PAINPG
PQRS Measure Charge Sheet


Comment: 





A  62  yr old male with a history of severe and chronic low back pain secondary 

to lumbar degenerative disc diseases and lumbar spondylosis with facet 

arthropathy presents today for evaluation s/p BL RFA L3-L5 and medication 

refills. Pt states he received 20% pain relief s/p procedure. Pain level is  

currently at  4/10 in intensity, constant,  sharp, stabbing, sore shooting 

towards the BLEs. Pain is provoked by chiropractic treatments which provide no 

relief, standing/ walking for periods of 15 min or more. Pain is alleviated with

 medications (MS), pain patches, heat, ice, sitting, repositioning and rest.





Interventional pain procedures completed include LESIs, BL RFA L3-L5


Patient is currently on Morphine Sulfate 15mg #60


Patient denies any side effects of the medication(s), denies excessive 

drowsiness or sleepiness, denies suicidal ideation and reports that the current 

pain medication is  helping to control the  pain and improve activities of daily

living.


Patient denies any motor or sensory deficits. Patient denies any fever or night 

sweats, denies any change in the bowel movements or urination.


 


Physical Examination:


  -Constitutional: Cooperative. Not in acute distress .                         

                                                                                

                                                                                

  


 - Neurologic:  Cranial nerve II to  XII  intact. No focal neurological 

deficits.


 - Psychatric: Alert & oriented x 3. Matching mood & appropriate affect. 

Judgment and insight intact.  


 - Musculoskeletal:     


Cervical spine: 


      Muscle bulk/ tone/ strength in the bilateral upper extremities normal


      Vertebral body tenderness to palpation over 


      Spurling test positive


      Distraction test positive


      Facet loading test positive





Thoracic spine   


     Muscle bulk / tone/ strength in the bilateral paraspinal muscles normal


     Vertebral body tender to palpation over


     Facet loading test positive


                                                                                

                                                                                

                                                                                

                                                                                

                                                                                

    


Lumbar spine: 


     Motor bulk/ tone/ strength  lower extremities , thigh and legs : 5/5 


     Deep tendon reflexes :   Normal  Knee Jerk. Normal Ankle Jerk  .


     Vertebral body tenderness to palpation over L1-L2


     Lumbar Facet Loading Test  positive 


     Straight Leg Raise: positive at  30  degrees right side/ left side 


     Gaenslen's Test positive  





Sacral spine :


     Severe tenderness over the Sacroiliac joint:  right side / left side 


     Range of motion: Flexion of the lumbar spine <60 degrees


     Range of motion: Extension of the lumbar spine <20 degrees


     Gaenslen's Test positive 


     Jerry's Test positive 


     Mirna test: positive  right side /  left side


     Thigh Thrust Test


     Sacral Thrust Test





Assessment and plan:


       Chronic low back pain secondary to lumbar degenerative disc disease , 

lumbar spondylosis with facet arthropathy without myelopathy


       Recommendation of LESI L1-L2. May need a series of injections, up to 4 

within a 12 mo period, for optimal pain relief.  Risks, benefits of procedure 

discussed and pt verbalized understanding. Denies anticoagulant use or medical 

history of diabetes. 


       Chronic and current use of high-risk medication (Opioids).


       The patient was counseled about risk of opioid use, psychological risk 

associated with opioids and was orally counseled to not overuse ,


       divert or sell medications. Pt is to store medication in a safe location.

                     


       The  patient is counseled against driving while using narcotic 

medications and also not to use alcohol or any illicit recreational drugs. 


       Patient verbalized understanding that the lack of compliance will result 

in failure to renew narcotic prescription(s) as well as possible discharge from 

the clinic


       Diagnoses, prognosis and treatment options including but not limited to 

physical therapy, surgical interventions, interventional therapies and 

medication management including narcotics and adjuvant medication were 

discussed.


       All patient questions answered 


       MAPS reviewed and it was appropriate.


       UDS to be collected at next visit. Pt states he no longer takes Norco 

because it does not alleviate pain.


       Prescription refill for Morphine Sulfate 30mg #60 w 1 refill.





I have spent less than 30 minutes on patient care today. Dr Cummins was 

available by phone for the evaluation of this patient. The time was used to 

review the medical records including relevant urine studies and Prescription 

history (MAPs), review of the available imaging, evaluation and examination of 

the patient, coordination of care with the medical staff and if applicable 

referring physicians, as well as creation of the medical record


                                                  





- Pain Location


  ** Bilateral Lower Back


Non-Pharmacological Interventions: Chiropractic Treatment, Heat, Ice, 

Inactivity, Sitting


Pharmacological Interventions: Block, Epidural, Scheduled Medication, Topical 

Medication


PQRS Narrative: 


                                        





Narcotic Agreement Date Signed   02/07/22


Hx Alcohol Use (MH)              No








Home Medications: 


Ambulatory Orders





Atorvastatin [Lipitor] 20 mg PO DAILY 06/25/21 


OXcarbazepine [Trileptal] 300 mg PO BID 06/25/21 


Pantoprazole Sodium [Protonix] 40 mg PO BID 06/25/21 


Sucralfate [Carafate] 1 gm PO QID 06/25/21 


traZODone HCL [Desyrel] 100 mg PO HS 06/25/21 


ALPRAZolam [Xanax] 0.5 mg PO BID 04/29/22 


Budesonide [Pulmicort] 0.25 mg INHALATION RT-BID 04/29/22 


Hydrocortisone [Cortef] 10 mg PO W/SUPPER 04/29/22 


Ipratropium-Albuterol Nebulize [Duoneb 0.5 mg-3 mg/3 ml Soln] 3 ml INHALATION 

RT-QID 04/29/22 


Ondansetron [Zofran] 4 mg PO Q8HR PRN 04/29/22 


metFORMIN  mg PO BID 04/29/22 


Albuterol Nebulized [Ventolin Nebulized] 2.5 mg INHALATION RT-QID 06/09/22 


Fluconazole [Diflucan] 100 mg PO DAILY PRN 06/09/22 


Insulin Aspart [NovoLOG Flexpen] See Protocol SQ ACHS 06/10/22 


polyethylene glycoL 3350 [Miralax] 17 gm PO DAILY  packet 06/14/22 


Dicyclomine [Bentyl] 10 mg PO TID PRN 10 Days #30 capsule 07/27/22 


Famotidine 40 mg PO DAILY 07/27/22 


Gabapentin [Neurontin] 400 mg PO BID 07/27/22 


Metoclopramide [Reglan] 5 mg PO AC-BID PRN 07/27/22 


Semaglutide [Rybelsus] 3 mg PO DAILY 07/27/22 


oxyCODONE-APAP 10-325MG [Percocet  mg] 1 tab PO Q8HR PRN 07/27/22 


Morphine Sulfate ER [Ms Contin] 30 mg PO BID 30 Days #60 tab 08/03/22 


Morphine Sulfate ER [Ms Contin] 30 mg PO Q12HR 30 Days #60 tab 08/03/22 











Controlled Substance Measures





- Controlled Substance Measures


Is patient prescribed a controlled substance at discharge?: Yes


When asked, does pt state using other controlled substances?: No


If prescribed controlled substance>3 days was MAPS reviewed?: Yes


If Rx opioid, was Start Talking consent form obtained?: Yes


Was information provided regarding opioid addiction?: Yes

## 2022-09-07 ENCOUNTER — HOSPITAL ENCOUNTER (OUTPATIENT)
Dept: HOSPITAL 47 - PNWHC3 | Age: 62
End: 2022-09-07
Attending: ANESTHESIOLOGY
Payer: MEDICARE

## 2022-09-07 VITALS
RESPIRATION RATE: 16 BRPM | SYSTOLIC BLOOD PRESSURE: 142 MMHG | TEMPERATURE: 98.6 F | DIASTOLIC BLOOD PRESSURE: 58 MMHG | HEART RATE: 110 BPM

## 2022-09-07 DIAGNOSIS — Z79.891: ICD-10-CM

## 2022-09-07 DIAGNOSIS — G89.29: ICD-10-CM

## 2022-09-07 DIAGNOSIS — M47.816: ICD-10-CM

## 2022-09-07 DIAGNOSIS — M51.36: Primary | ICD-10-CM

## 2022-09-07 PROCEDURE — 99211 OFF/OP EST MAY X REQ PHY/QHP: CPT

## 2022-09-07 NOTE — P.PN
Subjective


Progress Note Date: 09/07/22





This is  62  yr old male with a history of severe and chronic low back pain 

secondary to lumbar degenerative disc diseases ,and lumbar spondylosis with 

facet arthropathy presents today for evaluation s/p BL RFA L3-L5 and medication 

refills. Pt states he received 20% pain relief s/p procedure.  Patient continued

to have severe low back pain with some numbness and tingling sensation in the 

lower extremity Pain level is  currently at  4/10 in intensity, constant,  

sharp, stabbing, sore shooting towards the BLEs. Pain is provoked by 

chiropractic treatments which provide no relief, standing/ walking for periods 

of 15 min or more, she'll currently on morphine sulfate 30 mg twice a day, was 

on Neurontin 400 mg twice a day and he stopped taking it for a few weeks, since 

reported that the current medication is not helping his pain


Patient denies any side effects of the medication(s), denies excessive d

rowsiness or sleepiness, denies suicidal ideation and reports that the current 

pain medication is  helping to control the  pain and improve activities of daily

living.


Patient denies any motor or sensory deficits. Patient denies any fever or night 

sweats, denies any change in the bowel movements or urination.


 


    


   Physical Examinations  :


    -Constitutiona       : Cooperative , not in acute distress .


    -HEENT                :  nech :  supple ,  no Lymphadenopathy  , normal  

thyroid  size .


                               :   eyes  :  no ptosis , no icterus,  no 

photophobia .                                                                   

                                                                                

                                                                                

                                                                                

                                                               


    - neurologic         :   Cranial nerve II   to  XII  intact ,  no   focal 

neurological deffecit  .


    -psychatric          : alert ,  oriented  X 3  ,   appropriate affect   , 

intact judgment  and insight  .  


    -Lymphatic          :    no Lymphadenopathy .


   - musculoskeltal   :     


                                   Lumber spine


                                         moter stegnth lower extremities ,thigh 

and legs  5/5 Right side ,  5/5  Left side 


                                         deep tendon reflexes :   normal  Knee 

Jerk    , normal   ankle Jerk  


                                         lumber facet Loading Test =positive 

Right , positive Left 


                                         Range of motion of the lumbar spine  

Flexion  30 degrees,   extension   10 degrees


                                         strait leg raising test = negative 

bilaterally 


                                          Fabere test= positive Right ,    and  

positive  LT .


                                          mild tenderness over the  Sacroiliac 

joint  on the Right , and Left  sides   


                                   





Assessment and plan:


       Chronic low back pain secondary to lumbar degenerative disc disease , 

lumbar spondylosis with facet arthropathy without myelopathy


       he had limited benefit from interventional pain management 


       Chronic and current use of high-risk medication (Opioids).


       The patient was counseled about risk of opioid use, psychological risk 

associated with opioids and was orally counseled to not overuse ,


       divert or sell medications. Pt is to store medication in a safe location.

                     


       The  patient is counseled against driving while using narcotic 

medications and also not to use alcohol or any illicit recreational drugs. 


       Patient verbalized understanding that the lack of compliance will result 

in failure to renew narcotic prescription(s) as well as possible discharge from 

the clinic


       Diagnoses, prognosis and treatment options including but not limited to 

physical therapy, surgical interventions, interventional therapies and 

medication management including narcotics and adjuvant medication were 

discussed.


       All patient questions answered 


       MAPS reviewed and it was appropriate.


       UDS to be collected at next visit. Pt states he no longer takes Norco 

because it does not alleviate pain.


       Recommend to discontinue morphine sulfate (Asian had no benefit from it )


       Recommend to start patient on fentanyl patch 25 g every 72 hours 

dispense 10 with no refills.


       ReCommend to restart patient on Neurontin 100 mg 3 times a day, dispensed

90 with no refills


       Next visit we'll reevaluate





Objective





- Vital Signs


Vital signs: 


                                   Vital Signs











Temp  98.6 F   09/07/22 14:09


 


Pulse  110 H  09/07/22 14:09


 


Resp  16   09/07/22 14:09


 


BP  142/58   09/07/22 14:09


 


Pulse Ox  96   09/07/22 14:09


 


FiO2      








                                 Intake & Output











 09/06/22 09/07/22 09/07/22





 18:59 06:59 18:59


 


Weight   80.286 kg

## 2022-09-23 ENCOUNTER — HOSPITAL ENCOUNTER (OUTPATIENT)
Dept: HOSPITAL 47 - RADCTMAIN | Age: 62
Discharge: HOME | End: 2022-09-23
Attending: INTERNAL MEDICINE
Payer: MEDICARE

## 2022-09-23 DIAGNOSIS — C34.92: ICD-10-CM

## 2022-09-23 DIAGNOSIS — Z03.89: Primary | ICD-10-CM

## 2022-09-23 LAB — BUN SERPL-SCNC: 9 MG/DL (ref 9–20)

## 2022-09-23 PROCEDURE — 82565 ASSAY OF CREATININE: CPT

## 2022-09-23 PROCEDURE — 36415 COLL VENOUS BLD VENIPUNCTURE: CPT

## 2022-09-23 PROCEDURE — 84520 ASSAY OF UREA NITROGEN: CPT

## 2022-09-23 PROCEDURE — 71260 CT THORAX DX C+: CPT

## 2022-09-23 PROCEDURE — 74177 CT ABD & PELVIS W/CONTRAST: CPT

## 2022-09-23 NOTE — CT
EXAMINATION TYPE: CT ChestAbdPelvis w con

 

DATE OF EXAM: 9/23/2022

 

COMPARISON: 7/13/2022

 

HISTORY: Lung cancer

 

CT DLP: 1106.7 mGycm

 

CONTRAST: 

CT scan of the chest, abdomen and pelvis is performed with Oral Contrast and with IV Contrast, patien
t injected with 70 mL of Isovue 300.

 

CT  Chest:

LUNGS: Left suprahilar mass with extension to the left upper lobe pleural surface. Suprahilar compone
nt measures 3.0 x 2.9 cm versus 2.5 x 2.4 cm previously. Left upper lobe pleural-based mass component
 currently measuring 3.0 x 3.0 cm versus 2.8 x 2.5 cm previously.

1 cm pleural-based nodule left upper lobe posteriorly image 23. No additional masses present. Scatter
ed subpleural fibrosis. Small stable 4 mm nodular density right upper lobe image 26.

 

MEDIASTINUM: Progressive mediastinal adenopathy. Low right paratracheal lymph node measures 1.7 cm pr
eviously versus 3.4 cm currently. AP window adenopathy measures 1.7 cm versus 1.6 cm. Increasing left
 hilar adenopathy measuring up to 1.8 cm. Left Epicardial lymph nodes persist and have increased in s
ize measuring 2.1 cm and 1.8 cm versus 10 mm and 7 mm previously. Thoracic aorta is of normal caliber
.: No evidence for mass.  No hilar adenopathy is appreciated.

 

OTHER: There are new paraspinal soft tissue masses measuring 1.6 cm and 2.1 cm left paraspinal region
 of T8 and T9 levels.

 

CONTRAST CT ABDOMEN AND PELVIS FINDINGS: 

 

LIVER/GB:   No calcified gallstones.  Hepatic steatosis noted. No space occupying hepatic lesion. Herberth
iary tree is of normal caliber. 

 

PANCREAS:  No inflammation.  No distinct mass. 

 

SPLEEN:  No splenic enlargement.  No lesion seen. 

 

ADRENALS: Left adrenal mass has increased in size and measures 3.1 x 3.2 cm versus 2.1 x 1.6 cm previ
ously.

 

KIDNEYS/BLADDER:  No hydronephrosis.  No nephrolithiasis.  No distinct renal mass. 

 

BOWEL: Normal appendix.  Normal bowel caliber.  No inflammation. 

 

GENITAL ORGANS:  No gross abnormality. 

 

LYMPH NODES:  No greater than 1cm abdominal or pelvic lymph nodes are appreciated.

 

AORTA: No significant abnormality. 

 

OSSEOUS STRUCTURES: Loss of vertebral body height at multiple levels persists. Bilateral hip prosthes
es with streak artifact within the pelvis.

 

OTHER:  No significant additional abnormality is seen.  

 

IMPRESSION: 

1. Findings compatible with disease progression with enlarging left upper mass and increasing adenopa
thy.

There is a new Pleural-based nodule left upper lobe posteriorly

2. There are new paraspinal soft tissue masses measuring 1.6 cm and 2.1 cm left paraspinal region of 
T8 and T9 levels. 3 enlarging left adrenal mass.

## 2022-10-01 ENCOUNTER — HOSPITAL ENCOUNTER (INPATIENT)
Dept: HOSPITAL 47 - EC | Age: 62
LOS: 10 days | Discharge: HOME | DRG: 392 | End: 2022-10-11
Attending: FAMILY MEDICINE | Admitting: FAMILY MEDICINE
Payer: MEDICARE

## 2022-10-01 VITALS — BODY MASS INDEX: 29.3 KG/M2

## 2022-10-01 DIAGNOSIS — K29.70: Primary | ICD-10-CM

## 2022-10-01 DIAGNOSIS — I27.20: ICD-10-CM

## 2022-10-01 DIAGNOSIS — E78.5: ICD-10-CM

## 2022-10-01 DIAGNOSIS — F41.9: ICD-10-CM

## 2022-10-01 DIAGNOSIS — K64.4: ICD-10-CM

## 2022-10-01 DIAGNOSIS — E87.1: ICD-10-CM

## 2022-10-01 DIAGNOSIS — E27.8: ICD-10-CM

## 2022-10-01 DIAGNOSIS — Z92.3: ICD-10-CM

## 2022-10-01 DIAGNOSIS — Z79.84: ICD-10-CM

## 2022-10-01 DIAGNOSIS — E87.6: ICD-10-CM

## 2022-10-01 DIAGNOSIS — F32.A: ICD-10-CM

## 2022-10-01 DIAGNOSIS — C79.70: ICD-10-CM

## 2022-10-01 DIAGNOSIS — R00.0: ICD-10-CM

## 2022-10-01 DIAGNOSIS — K52.9: ICD-10-CM

## 2022-10-01 DIAGNOSIS — D64.9: ICD-10-CM

## 2022-10-01 DIAGNOSIS — K12.30: ICD-10-CM

## 2022-10-01 DIAGNOSIS — D17.5: ICD-10-CM

## 2022-10-01 DIAGNOSIS — Z85.118: ICD-10-CM

## 2022-10-01 DIAGNOSIS — Z96.643: ICD-10-CM

## 2022-10-01 DIAGNOSIS — Z79.899: ICD-10-CM

## 2022-10-01 DIAGNOSIS — Z92.21: ICD-10-CM

## 2022-10-01 DIAGNOSIS — C34.90: ICD-10-CM

## 2022-10-01 DIAGNOSIS — G89.3: ICD-10-CM

## 2022-10-01 DIAGNOSIS — C78.00: ICD-10-CM

## 2022-10-01 DIAGNOSIS — C79.51: ICD-10-CM

## 2022-10-01 DIAGNOSIS — I31.39: ICD-10-CM

## 2022-10-01 DIAGNOSIS — K21.9: ICD-10-CM

## 2022-10-01 DIAGNOSIS — J43.9: ICD-10-CM

## 2022-10-01 DIAGNOSIS — D69.6: ICD-10-CM

## 2022-10-01 LAB
ALBUMIN SERPL-MCNC: 3.8 G/DL (ref 3.5–5)
ALP SERPL-CCNC: 88 U/L (ref 38–126)
ALT SERPL-CCNC: 26 U/L (ref 4–49)
ANION GAP SERPL CALC-SCNC: 15 MMOL/L
APTT BLD: 32.2 SEC (ref 22–30)
AST SERPL-CCNC: 41 U/L (ref 17–59)
BASOPHILS # BLD AUTO: 0 K/UL (ref 0–0.2)
BASOPHILS NFR BLD AUTO: 0 %
BUN SERPL-SCNC: 13 MG/DL (ref 9–20)
CALCIUM SPEC-MCNC: 8.6 MG/DL (ref 8.4–10.2)
CHLORIDE SERPL-SCNC: 96 MMOL/L (ref 98–107)
CO2 SERPL-SCNC: 15 MMOL/L (ref 22–30)
EOSINOPHIL # BLD AUTO: 0 K/UL (ref 0–0.7)
EOSINOPHIL NFR BLD AUTO: 1 %
ERYTHROCYTE [DISTWIDTH] IN BLOOD BY AUTOMATED COUNT: 2.38 M/UL (ref 4.3–5.9)
ERYTHROCYTE [DISTWIDTH] IN BLOOD: 17.6 % (ref 11.5–15.5)
GLUCOSE SERPL-MCNC: 205 MG/DL (ref 74–99)
GLUCOSE UR QL: (no result)
HCT VFR BLD AUTO: 26.3 % (ref 39–53)
HGB BLD-MCNC: 8.7 GM/DL (ref 13–17.5)
INR PPP: 1 (ref ?–1.2)
KETONES UR QL STRIP.AUTO: (no result)
LYMPHOCYTES # SPEC AUTO: 0.7 K/UL (ref 1–4.8)
LYMPHOCYTES NFR SPEC AUTO: 9 %
MAGNESIUM SPEC-SCNC: 1.7 MG/DL (ref 1.6–2.3)
MCH RBC QN AUTO: 36.5 PG (ref 25–35)
MCHC RBC AUTO-ENTMCNC: 33 G/DL (ref 31–37)
MCV RBC AUTO: 110.6 FL (ref 80–100)
MONOCYTES # BLD AUTO: 0.3 K/UL (ref 0–1)
MONOCYTES NFR BLD AUTO: 4 %
NEUTROPHILS # BLD AUTO: 6.7 K/UL (ref 1.3–7.7)
NEUTROPHILS NFR BLD AUTO: 85 %
PH UR: 5.5 [PH] (ref 5–8)
PLATELET # BLD AUTO: 90 K/UL (ref 150–450)
POTASSIUM SERPL-SCNC: 4.4 MMOL/L (ref 3.5–5.1)
PROT SERPL-MCNC: 7 G/DL (ref 6.3–8.2)
PT BLD: 10.5 SEC (ref 9–12)
SODIUM SERPL-SCNC: 126 MMOL/L (ref 137–145)
SP GR UR: 1.05 (ref 1–1.03)
UROBILINOGEN UR QL STRIP: <2 MG/DL (ref ?–2)
WBC # BLD AUTO: 7.9 K/UL (ref 3.8–10.6)

## 2022-10-01 PROCEDURE — 94640 AIRWAY INHALATION TREATMENT: CPT

## 2022-10-01 PROCEDURE — 77412 RADIATION TX DELIVERY LVL 3: CPT

## 2022-10-01 PROCEDURE — 43239 EGD BIOPSY SINGLE/MULTIPLE: CPT

## 2022-10-01 PROCEDURE — 93306 TTE W/DOPPLER COMPLETE: CPT

## 2022-10-01 PROCEDURE — 71275 CT ANGIOGRAPHY CHEST: CPT

## 2022-10-01 PROCEDURE — 85610 PROTHROMBIN TIME: CPT

## 2022-10-01 PROCEDURE — 74176 CT ABD & PELVIS W/O CONTRAST: CPT

## 2022-10-01 PROCEDURE — 77280 THER RAD SIMULAJ FIELD SMPL: CPT

## 2022-10-01 PROCEDURE — 83735 ASSAY OF MAGNESIUM: CPT

## 2022-10-01 PROCEDURE — 85027 COMPLETE CBC AUTOMATED: CPT

## 2022-10-01 PROCEDURE — 96374 THER/PROPH/DIAG INJ IV PUSH: CPT

## 2022-10-01 PROCEDURE — 83880 ASSAY OF NATRIURETIC PEPTIDE: CPT

## 2022-10-01 PROCEDURE — 85730 THROMBOPLASTIN TIME PARTIAL: CPT

## 2022-10-01 PROCEDURE — 77387 GUIDANCE FOR RADJ TX DLVR: CPT

## 2022-10-01 PROCEDURE — 77290 THER RAD SIMULAJ FIELD CPLX: CPT

## 2022-10-01 PROCEDURE — 99285 EMERGENCY DEPT VISIT HI MDM: CPT

## 2022-10-01 PROCEDURE — 88305 TISSUE EXAM BY PATHOLOGIST: CPT

## 2022-10-01 PROCEDURE — 77332 RADIATION TREATMENT AID(S): CPT

## 2022-10-01 PROCEDURE — 94760 N-INVAS EAR/PLS OXIMETRY 1: CPT

## 2022-10-01 PROCEDURE — 96375 TX/PRO/DX INJ NEW DRUG ADDON: CPT

## 2022-10-01 PROCEDURE — 77334 RADIATION TREATMENT AID(S): CPT

## 2022-10-01 PROCEDURE — 45380 COLONOSCOPY AND BIOPSY: CPT

## 2022-10-01 PROCEDURE — 80053 COMPREHEN METABOLIC PANEL: CPT

## 2022-10-01 PROCEDURE — 77307 TELETHX ISODOSE PLAN CPLX: CPT

## 2022-10-01 PROCEDURE — 96361 HYDRATE IV INFUSION ADD-ON: CPT

## 2022-10-01 PROCEDURE — 81003 URINALYSIS AUTO W/O SCOPE: CPT

## 2022-10-01 PROCEDURE — 85025 COMPLETE CBC W/AUTO DIFF WBC: CPT

## 2022-10-01 PROCEDURE — 93005 ELECTROCARDIOGRAM TRACING: CPT

## 2022-10-01 PROCEDURE — 36415 COLL VENOUS BLD VENIPUNCTURE: CPT

## 2022-10-01 PROCEDURE — 84484 ASSAY OF TROPONIN QUANT: CPT

## 2022-10-01 PROCEDURE — 80048 BASIC METABOLIC PNL TOTAL CA: CPT

## 2022-10-01 RX ADMIN — HYDROMORPHONE HYDROCHLORIDE PRN MG: 1 INJECTION, SOLUTION INTRAMUSCULAR; INTRAVENOUS; SUBCUTANEOUS at 23:44

## 2022-10-01 RX ADMIN — ISODIUM CHLORIDE SCH MG: 0.03 SOLUTION RESPIRATORY (INHALATION) at 19:28

## 2022-10-01 NOTE — ED
General Adult HPI





- General


Chief complaint: Shortness of Breath


Stated complaint: JARRED, Back Pain


Time Seen by Provider: 10/01/22 16:14


Source: patient


Mode of arrival: ambulatory


Limitations: no limitations





- History of Present Illness


Initial comments: 


Patient presents to the ED complaining of having left posterior rib/back pain 

that has become worse over the past few weeks.  Patient has metastatic lung 

cancer, and he states that he is always dyspneic, but he admits to having 

slightly increased dyspnea today.  Patient states that he did take 2 home neb 

treatments today.  Patient denies known trauma/injury/fall, fever or chills, 

headache, focal neuro deficit, chest pain or pressure, cough or cold symptoms, 

palpitations, dizziness, abdominal pain, nausea/vomiting/diarrhea, 

dysuria/hematuria/urinary frequency/urinary symptoms, leg pain/numbness/

weakness, incontinence, urinary retention, leg or calf swelling or pain, or any 

other symptoms or complaints.








- Related Data


                                Home Medications











 Medication  Instructions  Recorded  Confirmed


 


Atorvastatin [Lipitor] 20 mg PO DAILY 06/25/21 09/09/22


 


OXcarbazepine [Trileptal] 300 mg PO BID 06/25/21 09/09/22


 


Pantoprazole Sodium [Protonix] 40 mg PO BID 06/25/21 09/09/22


 


Sucralfate [Carafate] 1 gm PO QID 06/25/21 09/09/22


 


traZODone HCL [Desyrel] 100 mg PO HS 06/25/21 09/09/22


 


ALPRAZolam [Xanax] 0.5 mg PO BID 04/29/22 09/09/22


 


Budesonide [Pulmicort] 0.25 mg INHALATION RT-BID 04/29/22 09/09/22


 


Hydrocortisone [Cortef] 10 mg PO W/SUPPER 04/29/22 09/09/22


 


Ipratropium-Albuterol Nebulize 3 ml INHALATION RT-QID 04/29/22 09/09/22





[Duoneb 0.5 mg-3 mg/3 ml Soln]   


 


Ondansetron [Zofran] 4 mg PO Q8HR PRN 04/29/22 09/09/22


 


metFORMIN  mg PO BID 04/29/22 09/09/22


 


Albuterol Nebulized [Ventolin 2.5 mg INHALATION RT-QID 06/09/22 09/09/22





Nebulized]   


 


Fluconazole [Diflucan] 100 mg PO DAILY PRN 06/09/22 09/09/22


 


Insulin Aspart [NovoLOG Flexpen] See Protocol SQ ACHS 06/10/22 09/09/22


 


Famotidine 40 mg PO DAILY 07/27/22 09/09/22


 


Metoclopramide [Reglan] 5 mg PO AC-BID PRN 07/27/22 09/09/22


 


Semaglutide [Rybelsus] 3 mg PO DAILY 07/27/22 09/09/22








                                  Previous Rx's











 Medication  Instructions  Recorded


 


polyethylene glycoL 3350 [Miralax] 17 gm PO DAILY  packet 06/14/22


 


Dicyclomine [Bentyl] 10 mg PO TID PRN 10 Days #30 07/27/22





 capsule 


 


fentaNYL 25MCG/HR PATCH [Duragesic 25 mcg TRANSDERM Q72H 30 Days #10 09/07/22





25MCG/HR] patch 


 


fentaNYL 25MCG/HR PATCH [Duragesic 25 mcg TRANSDERM Q72H 15 Days #5 09/12/22





25MCG/HR] patch 


 


fentaNYL 25MCG/HR PATCH [Duragesic 25 mcg TRANSDERM Q72H 30 Days #10 09/28/22





25MCG/HR] patch 


 


Gabapentin [Neurontin] 100 mg PO BID 30 Days #60 cap 09/29/22


 


fentaNYL 25MCG/HR PATCH [Duragesic 25 mcg TRANSDERM Q48H 30 Days #15 09/29/22





25MCG/HR] patch 











                                    Allergies











Allergy/AdvReac Type Severity Reaction Status Date / Time


 


No Known Allergies Allergy   Verified 10/01/22 16:13














Review of Systems


ROS Statement: 


Those systems with pertinent positive or pertinent negative responses have been 

documented in the HPI.





ROS Other: All systems not noted in ROS Statement are negative.





Past Medical History


Past Medical History: Cancer, COPD, GERD/Reflux


Additional Past Medical History / Comment(s): LUNG CANCER WITH TUMOR IN STOMACH 

AND STATES ALSO IN HIS NECK.,.  RECEIVING CHEMOTHERAPY., DDD WITH BACK ,HIP & L

EG PAIN., (PAIN CLINIC PATIENT)., EMPHYSEMA., GASTRITIS.


History of Any Multi-Drug Resistant Organisms: None Reported


Past Surgical History: Joint Replacement, Orthopedic Surgery


Additional Past Surgical History / Comment(s): RIGHT AND LEFT TOTAL HIPS,  RIGHT

SHOULDER SURGERY.


Past Anesthesia/Blood Transfusion Reactions: No Reported Reaction


Past Psychological History: Anxiety, Depression


Smoking Status: Former smoker


Past Alcohol Use History: None Reported


Past Drug Use History: None Reported





- Past Family History


  ** Father


Family Medical History: No Reported History





General Exam


Limitations: no limitations


General appearance: alert


Head exam: Present: atraumatic, normocephalic


Eye exam: Present: normal appearance, EOMI


ENT exam: Present: mucous membranes moist


Neck exam: Present: other (Trachea is in midline)


Respiratory exam: Present: respiratory distress, wheezes.  Absent: rales, 

rhonchi, stridor, chest wall tenderness


Cardiovascular Exam: Present: normal rhythm, tachycardia, normal heart sounds, 

other (Normal radial pulses bilaterally)


GI/Abdominal exam: Present: soft.  Absent: distended, tenderness, guarding


Extremities exam: Present: other (Negative Homans sign bilaterally).  Absent: 

tenderness, pedal edema, calf tenderness


Back exam: Absent: tenderness, CVA tenderness (R), CVA tenderness (L)


Neurological exam: Present: alert, oriented X3, other (No evidence of lower 

extremity neurological deficit or saddle anesthesia on exam).  Absent: motor 

sensory deficit


Psychiatric exam: Present: anxious


Skin exam: Present: warm, dry, intact, normal color





Course


                                   Vital Signs











  10/01/22 10/01/22 10/01/22





  16:11 16:47 16:54


 


Temperature 98 F  


 


Pulse Rate 130 H 100 108 H


 


Respiratory 38 H  





Rate   


 


Blood Pressure 146/91  


 


O2 Sat by Pulse 96  





Oximetry   














  10/01/22





  17:11


 


Temperature 


 


Pulse Rate 123 H


 


Respiratory 25 H





Rate 


 


Blood Pressure 126/76


 


O2 Sat by Pulse 97





Oximetry 














- Reevaluation(s)


Reevaluation #1: 





10/01/22 19:07


Case, H&P, test results and ED management were discussed with BERNICE Summers. 

He accepts hospital admission on behalf of himself in Dr. Haines.  He agrees 

with oncology (Dr. Freire) consultation.  He has no further recommendations at this

time.


10/01/22 19:10


Patient is now breathing more comfortably.  Patient's wheezing has improved on 

examination.  Patient states that his dyspnea and pain have both improved, and 

he denies development of any new symptoms while in the ED.  Patient is aware of 

his test results, and he agrees with hospital admission at this time.





EKG Findings





- EKG Comments:


EKG Findings:: EKG is somewhat limited secondary to motion; Sinus tachycardia, 

ventricular rate 117 bpm, no ectopy, normal MI and QRS intervals, normal QT 

interval, normal axis, no definite ST or T-wave abnormality





Medical Decision Making





- Medical Decision Making


Patient's CTs are negative for a definite explanation for the patient's pain.  

Patient does not have any evidence of pulmonary embolism.  I suspect that the 

patient's pain is likely due to metastatic disease.  I suspect that the patien

t's wheezing and dyspnea are likely due to COPD exacerbation.  Will admit the 

patient to the hospital for continued pain management, neb treatments and 

oncology consultation.  BERNICE Summers has accepted admission on behalf of 

himself and Dr. Haines.








- Lab Data


Result diagrams: 


                                 10/01/22 16:43





                                 10/01/22 16:43


                                   Lab Results











  10/01/22 10/01/22 10/01/22 Range/Units





  16:43 16:43 16:43 


 


WBC  7.9    (3.8-10.6)  k/uL


 


RBC  2.38 L    (4.30-5.90)  m/uL


 


Hgb  8.7 L    (13.0-17.5)  gm/dL


 


Hct  26.3 L    (39.0-53.0)  %


 


MCV  110.6 H    (80.0-100.0)  fL


 


MCH  36.5 H    (25.0-35.0)  pg


 


MCHC  33.0    (31.0-37.0)  g/dL


 


RDW  17.6 H    (11.5-15.5)  %


 


Plt Count  90 L D    (150-450)  k/uL


 


MPV  8.9    


 


Neutrophils %  85    %


 


Lymphocytes %  9    %


 


Monocytes %  4    %


 


Eosinophils %  1    %


 


Basophils %  0    %


 


Neutrophils #  6.7    (1.3-7.7)  k/uL


 


Lymphocytes #  0.7 L    (1.0-4.8)  k/uL


 


Monocytes #  0.3    (0-1.0)  k/uL


 


Eosinophils #  0.0    (0-0.7)  k/uL


 


Basophils #  0.0    (0-0.2)  k/uL


 


Manual Slide Review  Performed    


 


Hypochromasia  Slight    


 


Poikilocytosis  Slight    


 


Anisocytosis  Slight    


 


Macrocytosis  Marked A    


 


PT   10.5   (9.0-12.0)  sec


 


INR   1.0   (<1.2)  


 


APTT   32.2 H   (22.0-30.0)  sec


 


Sodium    126 L  (137-145)  mmol/L


 


Potassium    4.4  (3.5-5.1)  mmol/L


 


Chloride    96 L  ()  mmol/L


 


Carbon Dioxide    15 L  (22-30)  mmol/L


 


Anion Gap    15  mmol/L


 


BUN    13  (9-20)  mg/dL


 


Creatinine    0.51 L  (0.66-1.25)  mg/dL


 


Est GFR (CKD-EPI)AfAm    >90  (>60 ml/min/1.73 sqM)  


 


Est GFR (CKD-EPI)NonAf    >90  (>60 ml/min/1.73 sqM)  


 


Glucose    205 H  (74-99)  mg/dL


 


Calcium    8.6  (8.4-10.2)  mg/dL


 


Magnesium    1.7  (1.6-2.3)  mg/dL


 


Total Bilirubin    0.2  (0.2-1.3)  mg/dL


 


AST    41  (17-59)  U/L


 


ALT    26  (4-49)  U/L


 


Alkaline Phosphatase    88  ()  U/L


 


Troponin I     (0.000-0.034)  ng/mL


 


NT-Pro-B Natriuret Pep     pg/mL


 


Total Protein    7.0  (6.3-8.2)  g/dL


 


Albumin    3.8  (3.5-5.0)  g/dL














  10/01/22 10/01/22 Range/Units





  16:43 16:43 


 


WBC    (3.8-10.6)  k/uL


 


RBC    (4.30-5.90)  m/uL


 


Hgb    (13.0-17.5)  gm/dL


 


Hct    (39.0-53.0)  %


 


MCV    (80.0-100.0)  fL


 


MCH    (25.0-35.0)  pg


 


MCHC    (31.0-37.0)  g/dL


 


RDW    (11.5-15.5)  %


 


Plt Count    (150-450)  k/uL


 


MPV    


 


Neutrophils %    %


 


Lymphocytes %    %


 


Monocytes %    %


 


Eosinophils %    %


 


Basophils %    %


 


Neutrophils #    (1.3-7.7)  k/uL


 


Lymphocytes #    (1.0-4.8)  k/uL


 


Monocytes #    (0-1.0)  k/uL


 


Eosinophils #    (0-0.7)  k/uL


 


Basophils #    (0-0.2)  k/uL


 


Manual Slide Review    


 


Hypochromasia    


 


Poikilocytosis    


 


Anisocytosis    


 


Macrocytosis    


 


PT    (9.0-12.0)  sec


 


INR    (<1.2)  


 


APTT    (22.0-30.0)  sec


 


Sodium    (137-145)  mmol/L


 


Potassium    (3.5-5.1)  mmol/L


 


Chloride    ()  mmol/L


 


Carbon Dioxide    (22-30)  mmol/L


 


Anion Gap    mmol/L


 


BUN    (9-20)  mg/dL


 


Creatinine    (0.66-1.25)  mg/dL


 


Est GFR (CKD-EPI)AfAm    (>60 ml/min/1.73 sqM)  


 


Est GFR (CKD-EPI)NonAf    (>60 ml/min/1.73 sqM)  


 


Glucose    (74-99)  mg/dL


 


Calcium    (8.4-10.2)  mg/dL


 


Magnesium    (1.6-2.3)  mg/dL


 


Total Bilirubin    (0.2-1.3)  mg/dL


 


AST    (17-59)  U/L


 


ALT    (4-49)  U/L


 


Alkaline Phosphatase    ()  U/L


 


Troponin I  <0.012   (0.000-0.034)  ng/mL


 


NT-Pro-B Natriuret Pep   182  pg/mL


 


Total Protein    (6.3-8.2)  g/dL


 


Albumin    (3.5-5.0)  g/dL














- Radiology Data


CT angiography chest with IV contrast: 





No evidence of pulmonary embolism.  Large mass at the left pulmonary hilum with 

encasement of the left lower lobe pulmonary artery which has progressed compared

to old exam.  Left upper lobe masslike infiltrate slightly decreased in size 

compared to the old exam.  Massive mediastinal adenopathy without change.  There

is right middle lobe nodule which is new compared to old exam and could be 

metastatic disease.  There is left adrenal mass increased compared to old exam 

and consistent with metastatic disease.








Noncontrast CT abdomen/pelvis:





Pericardial effusion is slightly increased compared to last exam.  Masses at the

left cardiac border are the same or increased.  Left adrenal mass slightly 

increased.  Retrocrural metastatic nodules in the left paraspinal region 

slightly increased.





Disposition


Clinical Impression: 


 COPD exacerbation, Back pain, Metastatic disease, Hyponatremia





Disposition: ADMITTED AS IP TO THIS HOSP


Condition: Stable


Is patient prescribed a controlled substance at d/c from ED?: No


Referrals: 


None,Stated [REFERRING] - 1-2 days


Time of Disposition: 19:11

## 2022-10-01 NOTE — CT
EXAMINATION TYPE: CT abdomen pelvis wo con

 

DATE OF EXAM: 10/1/2022

 

COMPARISON: 9/23/2022

 

HISTORY: left posterior rib/flank pain. pt had hard time holding breath for length of scan due to met
astatic lung CA. no contrast. pt had ct scan on 9.23.22 with oral and iv contrast

 

CT DLP: combined 2277.3 mGycm

Automated exposure control for dose reduction was used.

 

Images obtained from the diaphragm to the floor the pelvis with oral contrast only.

 

There is normal size heart. There is pericardial effusion. There are rounded masses at the left cardi
ac border consistent with metastatic disease. There is some mild interstitial infiltrates in the lowe
r lung fields. No pleural effusion.

 

Liver and spleen are intact. Stomach is intact. The bile duct are not dilated. No pancreatic mass. Ga
llbladder appears normal.

There are retrocrural masses in the left paraspinal region of the lower thoracic spine. These are con
sistent with metastatic disease. Masses measure up to 1.5 cm in thickness.

There is 4 cm rounded left adrenal mass. Kidneys have normal size. No hydronephrosis. Ureters are not
 dilated. No retroperitoneal adenopathy. Abdominal aorta is atheromatous.

 

The bladder distends smoothly. There is no inguinal hernia. There is metal artifact from bilateral hi
p prosthesis.

 

No mesenteric edema. No ascites or free air. No sign of a bowel obstruction. There is contrast materi
al down to the rectum. Appendix appears normal.

 

There is compression deformities of the lumbar vertebra up to 50%. There is biconcave deformities. Th
ere is vertebroplasty of L2 and T12.

 

IMPRESSION:

Pericardial effusion is slightly increased compared to last exam. Masses at the left cardiac border a
re the same or increased. Left adrenal mass slightly increased. Retrocrural metastatic nodules in the
 left paraspinal region slightly increased.

## 2022-10-01 NOTE — CT
EXAMINATION TYPE: CT chest angio for PE

 

DATE OF EXAM: 10/1/2022

 

COMPARISON: 3/9/2022

 

HISTORY: metastatic lung CA and JARRED

 

CT DLP: combined 2277.3 mGycm

Automated exposure control for dose reduction was used.

 

CONTRAST: 

Performed with IV Contrast, patient injected with 68 mL of Isovue 370.

 

 

There are Three-D postprocessed images.

 

There is mediastinal adenopathy with paratracheal and anterior mediastinal masses that measure up to 
3.5 cm. There is mass at the left pulmonary hilum that measures 6 cm. There is some encasement of the
 pulmonary arteries. There is significant luminal narrowing of the left lower lobe pulmonary artery. 
No filling defect. There is a 3.5 cm mass at the left cardiac border cardiophrenic angle. There is 1.
6 cm rounded mass also at the left lateral cardiac border. Thoracic aorta is intact. No aneurysm or d
issection. Heart size is normal.

 

There is some left upper lobe patchy consolidation extending from the left ovary hilum to the left up
per lateral chest wall. There is pulmonary emphysema. There is some reticular patchy interstitial inf
iltrate in the right mid lung field. There is 1 cm noncalcified nodule in the lateral right middle lo
be. There is a 4 cm left adrenal mass.

 

There is biconcave deformity and compression fractures of numerous thoracic vertebra up to 50%. [place]ountain
um is intact. There is vertebroplasty of T12.

 

IMPRESSION:

No evidence of pulmonary embolism. 

 

Large mass at the left pulmonary hilum with encasement of the left lower lobe pulmonary artery which 
has progressed compared to old exam. Left upper lobe masslike infiltrate slightly decreased in size c
ompared to the old exam. Massive mediastinal adenopathy without change. There is right middle lobe no
dule which is new compared to old exam and could be metastatic disease. There is left adrenal mass in
creased compared to old exam and consistent with metastatic disease.

## 2022-10-02 LAB
ALBUMIN SERPL-MCNC: 4.1 G/DL (ref 3.8–4.9)
ALBUMIN/GLOB SERPL: 1.35 G/DL (ref 1.6–3.17)
ALP SERPL-CCNC: 83 U/L (ref 41–126)
ALT SERPL-CCNC: 37 U/L (ref 10–49)
ANION GAP SERPL CALC-SCNC: 17.2 MMOL/L (ref 10–18)
AST SERPL-CCNC: 45 U/L (ref 14–35)
BASOPHILS # BLD AUTO: 0.01 X 10*3/UL (ref 0–0.1)
BASOPHILS NFR BLD AUTO: 0.1 %
BUN SERPL-SCNC: 15.4 MG/DL (ref 9–27)
BUN/CREAT SERPL: 19.85 RATIO (ref 12–20)
CALCIUM SPEC-MCNC: 8.4 MG/DL (ref 8.7–10.3)
CHLORIDE SERPL-SCNC: 97 MMOL/L (ref 96–109)
CO2 SERPL-SCNC: 17.4 MMOL/L (ref 20–27.5)
EOSINOPHIL # BLD AUTO: 0 X 10*3/UL (ref 0.04–0.35)
EOSINOPHIL NFR BLD AUTO: 0 %
ERYTHROCYTE [DISTWIDTH] IN BLOOD BY AUTOMATED COUNT: 2.51 X 10*6/UL (ref 4.4–5.6)
ERYTHROCYTE [DISTWIDTH] IN BLOOD: 18.6 % (ref 11.5–14.5)
GLOBULIN SER CALC-MCNC: 3 G/DL (ref 1.6–3.3)
GLUCOSE BLD-MCNC: 108 MG/DL (ref 70–110)
GLUCOSE SERPL-MCNC: 174 MG/DL (ref 70–110)
HCT VFR BLD AUTO: 28.5 % (ref 39.6–50)
HGB BLD-MCNC: 9.3 G/DL (ref 13–17)
IMM GRANULOCYTES BLD QL AUTO: 0.8 %
LYMPHOCYTES # SPEC AUTO: 0.61 X 10*3/UL (ref 0.9–5)
LYMPHOCYTES NFR SPEC AUTO: 8.6 %
MCH RBC QN AUTO: 37.1 PG (ref 27–32)
MCHC RBC AUTO-ENTMCNC: 32.6 G/DL (ref 32–37)
MCV RBC AUTO: 113.5 FL (ref 80–97)
MONOCYTES # BLD AUTO: 0.22 X 10*3/UL (ref 0.2–1)
MONOCYTES NFR BLD AUTO: 3.1 %
NEUTROPHILS # BLD AUTO: 6.16 X 10*3/UL (ref 1.8–7.7)
NEUTROPHILS NFR BLD AUTO: 87.4 %
NRBC BLD AUTO-RTO: 0 /100 WBCS (ref 0–0)
PLATELET # BLD AUTO: 89 X 10*3/UL (ref 140–440)
POTASSIUM SERPL-SCNC: 3.9 MMOL/L (ref 3.5–5.5)
PROT SERPL-MCNC: 7 G/DL (ref 6.2–8.2)
SODIUM SERPL-SCNC: 132 MMOL/L (ref 135–145)
WBC # BLD AUTO: 7.06 X 10*3/UL (ref 4.5–10)

## 2022-10-02 RX ADMIN — FAMOTIDINE SCH MG: 20 TABLET, FILM COATED ORAL at 13:00

## 2022-10-02 RX ADMIN — HYDROMORPHONE HYDROCHLORIDE PRN MG: 1 INJECTION, SOLUTION INTRAMUSCULAR; INTRAVENOUS; SUBCUTANEOUS at 02:37

## 2022-10-02 RX ADMIN — IPRATROPIUM BROMIDE AND ALBUTEROL SULFATE PRN ML: .5; 3 SOLUTION RESPIRATORY (INHALATION) at 11:50

## 2022-10-02 RX ADMIN — HYDROMORPHONE HYDROCHLORIDE PRN MG: 1 INJECTION, SOLUTION INTRAMUSCULAR; INTRAVENOUS; SUBCUTANEOUS at 13:12

## 2022-10-02 RX ADMIN — PANTOPRAZOLE SODIUM SCH MG: 40 INJECTION, POWDER, FOR SOLUTION INTRAVENOUS at 01:45

## 2022-10-02 RX ADMIN — PANTOPRAZOLE SODIUM SCH MG: 40 INJECTION, POWDER, FOR SOLUTION INTRAVENOUS at 08:20

## 2022-10-02 RX ADMIN — ISODIUM CHLORIDE SCH MG: 0.03 SOLUTION RESPIRATORY (INHALATION) at 01:21

## 2022-10-02 RX ADMIN — MORPHINE SULFATE PRN MG: 30 TABLET, FILM COATED, EXTENDED RELEASE ORAL at 13:01

## 2022-10-02 RX ADMIN — HYDROMORPHONE HYDROCHLORIDE PRN MG: 1 INJECTION, SOLUTION INTRAMUSCULAR; INTRAVENOUS; SUBCUTANEOUS at 06:23

## 2022-10-02 RX ADMIN — BUDESONIDE SCH MG: 0.25 SUSPENSION RESPIRATORY (INHALATION) at 20:24

## 2022-10-02 RX ADMIN — HYDROMORPHONE HYDROCHLORIDE PRN MG: 1 INJECTION, SOLUTION INTRAMUSCULAR; INTRAVENOUS; SUBCUTANEOUS at 21:38

## 2022-10-02 RX ADMIN — IPRATROPIUM BROMIDE AND ALBUTEROL SULFATE PRN ML: .5; 3 SOLUTION RESPIRATORY (INHALATION) at 20:24

## 2022-10-02 RX ADMIN — PANTOPRAZOLE SODIUM SCH MG: 40 TABLET, DELAYED RELEASE ORAL at 21:37

## 2022-10-02 RX ADMIN — GABAPENTIN SCH MG: 100 CAPSULE ORAL at 21:37

## 2022-10-02 RX ADMIN — HYDROMORPHONE HYDROCHLORIDE PRN MG: 1 INJECTION, SOLUTION INTRAMUSCULAR; INTRAVENOUS; SUBCUTANEOUS at 09:31

## 2022-10-02 RX ADMIN — KETOROLAC TROMETHAMINE PRN MG: 15 INJECTION, SOLUTION INTRAMUSCULAR; INTRAVENOUS at 01:46

## 2022-10-02 RX ADMIN — ISODIUM CHLORIDE SCH MG: 0.03 SOLUTION RESPIRATORY (INHALATION) at 08:09

## 2022-10-02 RX ADMIN — HYDROCORTISONE SCH MG: 10 TABLET ORAL at 17:36

## 2022-10-02 RX ADMIN — HYDROMORPHONE HYDROCHLORIDE PRN MG: 1 INJECTION, SOLUTION INTRAMUSCULAR; INTRAVENOUS; SUBCUTANEOUS at 16:36

## 2022-10-02 NOTE — P.CONS
History of Present Illness





- Reason for Consult


Consult date: 10/02/22





- History of Present Illness


  


The patient is a 62-year-old white male,well known to myself, with a a known 

diagnosis of metastatic small cell lung cancer.  The patient has had ongoing 

issues with left-sided mid back and flank pain with radiation to the abdomen, as

well as generalized abdominal pain.  He has previously been admitted with 

complaint of abdominal pain, with no etiology found on workup.  The patient 

follows up with the pain clinic, and has been prescribed fentanyl 25 g, as well

as morphine.  He was seen in the office recently, with CT scans of the chest 

abdomen and pelvis showing evidence of progression in the lung, as well as a new

paraspinal masses in the lower thoracic/upper lumbar area.  The patient 

apparently had used up his morphine prior to the end date of his prescription. 

apparently he had not received his fentanyl prescription either.he had contacted

the pain clinic and had an appointment upcoming this week. he did become his 

fentanyl prescription and apply detachment states that his lower, generalized 

abdominal pain was very significant, and not controlled with the same.  He 

therefore came into the emergency room.  In the ER, on evaluation he was also 

found to be more short of breath compared to his baseline.


  He was therefore admitted for further management.  


  His oncology history is as follows:


  He was initially seen in consult at [facility] on 6/26/21.  

He had been admitted with progressively worsening shortness of breath over the 

past 2 weeks.  In the ER he had a chest x-ray that revealed essentially 

opacification of the left hemithorax.  A CT scan showed a large mediastinal mass

measuring 9.5 cm with pleural effusion.


 The patient was subsequently admitted to the ICU.  He then had a thoracentesis.

 At the time of his initial evaluation cytology was pending.  This subsequently 

came back positive for metastatic small cell carcinoma


 CT of the abdomen and pelvis on 6/28/21 showed a large left-sided pleural 

effusion, bilateral retroperitoneal nodes largest measuring 1.9 cm.  In addition

there were periaortic nodes and multiple iliac chain and perirectal lymph nodes,

largest measuring 1.2 cm.  MRI of the brain on 6/28/21 showed no metastatic 

disease.


  Even after the thoracentesis and some improvement, the patient is still 

extremely short of breath.  After cytology was obtained, the diagnosis and 

indications were discussed in detail with the patient and his family.  They 

decided to opt for active treatment and the patient received -16 and 

carboplatin inpatient from 7/3-7/5/21.


  The patient actually tolerated treatment quite well with mucositis as the main

side effects.  Posttreatment within 2-3 days he started having improvement in 

his shortness of breath.  As the patient was still quite weak, he was 

[facility].  He came back home on 7/26/21 and was seen

for his first office visit on 7/28/21.


  The patient has a long-standing history of smoking, currently about a pack a 

day.  He had started smoking in his early teens and for most of his life and 

smoked between 1-1/2-2 packs a day.  Prior to his symptoms leading up to this 

admission his performance status had been well maintained.  He did have a prior 

history of COPD, and has been on O2 since his hospitalization.


     The patient was started back on chemotherapy after his initial office visit

on 7/28/21.  Immunotherapy was not covered by his insurance, despite multiple 

attempts from our office.


   The patient was subsequently able to get coverage for immunotherapy, and 

started Tecentriq on 1/12/22. 


   H reported some increase in cough at his visit in 1/22. Repeat Ct scans 

showed increase in size at the primary sites and in some mmediastinal homer 

areas.  It is felt that this potentially could be due to pseudo-progression from

immunotherapy.  Therefore chemotherapy was continued with plan to repeat CT 

scans in 5-6 weeks.  The patient had a CTA in the hospital in early 3/22 as he 

was having increased shortness of breath.  This confirmed further progression.


  Chemotherapy was therefore discontinued and the patient was started on second 

line treatment with carboplatin and IV irinotecan on 3/17/22.  He is status post

6 cycles


     He was admitted during C4 with abdominal pain. No specific etiology was 

found, on imaging and labs. It was felt this was possibly due to chemo related 

bowel inflammation. He improved with supportive care. 


   


 9/28/22 OV note:  He denied any fever/chills/n/vomiting. he continues to 

complain of left mid back/flank pain, with radiation and anteriorly, which has 

become more prominent.  Patient is on fentanyl 25 g, prescribed by the pain 

clinic, but states that this is not effective.


   He reports persistent SOB with exertion. this is overall stable. he has not 

had O2 at home due to insurance issues. He reports stable cough. Breathing is 

stable. He has had dizziness with changing positions, that is stable. He has not

had any near syncopes or falls. He continues to have some hoarseness of voice, 

and occasional difficulty swallowing with thin liquids.   He states that he is 

being careful after sitting or laying down for any prolonged period of time.  He

has not had any falls or syncope.  No history of any mouth sores or diarrhea.


    most recent CT scans from 9/23/22 showed progression in the lung, as well as

new paraspinal masses.  Patient's current treatment was discontinued.  He was 

recommended to be switched to Lubrinectedin, and was also referred to radiation 

oncology for radiation to the paraspinal masses.  He has not yet started his new

treatment.  





Review of Systems


Constitutional: Reports chronic pain, Reports fatigue, Reports poor appetite, 

Reports weakness


Eyes: denies blurred vision, denies pain


Ears: bilateral: decreased hearing, deny: ear discharge, earache, tinnitus


Ears, nose, mouth and throat: Reports hoarseness


Cardiovascular: Reports shortness of breath


Respiratory: Reports cough (the), Reports dyspnea


Gastrointestinal: Reports abdominal pain, Reports loss of appetite


Genitourinary: Reports as per HPI (he is an)


Musculoskeletal: Reports muscle weakness (is)


Integumentary: Denies pruritus, Denies rash


Neurological: Reports weakness (he isS)


Psychiatric: Reports anxiety (she)


Endocrine: Reports fatigue, Reports weight change (this)


Hematologic/Lymphatic: Reports as per HPI





Past Medical History


Past Medical History: Cancer, COPD, GERD/Reflux


Additional Past Medical History / Comment(s): LUNG CANCER WITH TUMOR IN STOMACH 

AND STATES ALSO IN HIS NECK.,.  RECEIVING CHEMOTHERAPY., DDD WITH BACK ,HIP & 

LEG PAIN., (PAIN CLINIC PATIENT)., EMPHYSEMA., GASTRITIS.


History of Any Multi-Drug Resistant Organisms: None Reported


Past Surgical History: Joint Replacement, Orthopedic Surgery


Additional Past Surgical History / Comment(s): RIGHT AND LEFT TOTAL HIPS,  RIGHT

SHOULDER SURGERY.


Past Anesthesia/Blood Transfusion Reactions: No Reported Reaction


Past Psychological History: Anxiety, Depression


Smoking Status: Former smoker


Past Alcohol Use History: None Reported


Past Drug Use History: None Reported





- Past Family History


  ** Father


Family Medical History: No Reported History





Medications and Allergies


                                Home Medications











 Medication  Instructions  Recorded  Confirmed  Type


 


Atorvastatin [Lipitor] 20 mg PO DAILY 06/25/21 10/01/22 History


 


OXcarbazepine [Trileptal] 300 mg PO BID 06/25/21 10/01/22 History


 


Pantoprazole Sodium [Protonix] 40 mg PO BID 06/25/21 10/01/22 History


 


Sucralfate [Carafate] 1 gm PO ACHS 06/25/21 10/01/22 History


 


traZODone HCL [Desyrel] 100 mg PO HS 06/25/21 10/01/22 History


 


ALPRAZolam [Xanax] 0.5 mg PO BID PRN 04/29/22 10/01/22 History


 


Budesonide [Pulmicort] 0.25 mg INHALATION RT-BID 04/29/22 10/01/22 History


 


Hydrocortisone [Cortef] 10 mg PO DAILY@1400 04/29/22 10/01/22 History


 


Ipratropium-Albuterol Nebulize 3 ml INHALATION RT-QID PRN 04/29/22 10/01/22 

History





[Duoneb 0.5 mg-3 mg/3 ml Soln]    


 


metFORMIN  mg PO BID 04/29/22 10/01/22 History


 


Albuterol Nebulized [Ventolin 2.5 mg INHALATION RT-QID PRN 06/09/22 10/01/22 

History





Nebulized]    


 


Fluconazole [Diflucan] 100 mg PO DAILY PRN 06/09/22 10/01/22 History


 


Insulin Aspart [NovoLOG Flexpen] See Protocol SQ ACHS 06/10/22 10/01/22 History


 


Dicyclomine [Bentyl] 10 mg PO TID PRN 10 Days #30 07/27/22 10/01/22 Rx





 capsule   


 


Famotidine 40 mg PO DAILY 07/27/22 10/01/22 History


 


Metoclopramide [Reglan] 5 mg PO AC-BID PRN 07/27/22 10/01/22 History


 


Semaglutide [Rybelsus] 3 mg PO DAILY 07/27/22 10/01/22 History


 


Gabapentin [Neurontin] 100 mg PO BID 30 Days #60 cap 09/29/22 10/01/22 Rx


 


Hydrocortisone [Cortef] 20 mg PO DAILY@0800 10/01/22 10/01/22 History


 


Morphine Sulfate ER [Ms Contin] 30 mg PO Q12HR PRN 10/01/22 10/01/22 History


 


fentaNYL 25MCG/HR PATCH [Duragesic 1 patch TRANSDERM Q72H 10/01/22 10/01/22 

History





25MCG/HR]    


 


polyethylene glycoL 3350 [Miralax] 17 gm PO DAILY PRN 10/01/22 10/01/22 History








                                    Allergies











Allergy/AdvReac Type Severity Reaction Status Date / Time


 


No Known Allergies Allergy   Verified 10/01/22 21:46














Physical Exam


Vitals: 


                                   Vital Signs











  Temp Pulse Pulse Resp BP BP Pulse Ox


 


 10/02/22 20:36   104 H     


 


 10/02/22 20:24   105 H      95


 


 10/02/22 16:13  97.9 F   117 H  18   104/59  94 L


 


 10/02/22 13:02   111 H   20  137/60   96


 


 10/02/22 11:59   110 H     


 


 10/02/22 11:50   109 H     


 


 10/02/22 08:21   110 H     


 


 10/02/22 08:09   110 H     


 


 10/02/22 07:27   111 H   18  113/69   100


 


 10/02/22 01:32   117 H     


 


 10/02/22 01:21   114 H     


 


 10/02/22 01:10  98.9 F   120 H  22   142/80  97


 


 10/01/22 23:40   116 H   20  125/73   97














  FiO2


 


 10/02/22 20:36 


 


 10/02/22 20:24  21


 


 10/02/22 16:13 


 


 10/02/22 13:02 


 


 10/02/22 11:59 


 


 10/02/22 11:50 


 


 10/02/22 08:21 


 


 10/02/22 08:09 


 


 10/02/22 07:27 


 


 10/02/22 01:32 


 


 10/02/22 01:21 


 


 10/02/22 01:10 


 


 10/01/22 23:40 








                                Intake and Output











 10/02/22 10/02/22 10/03/22





 14:59 22:59 06:59


 


Intake Total  118 


 


Balance  118 


 


Intake:   


 


  Oral  118 


 


Other:   


 


  # Voids  2 


 


  Weight  77.564 kg 














- Constitutional


General appearance: mild distress





- EENT


Eyes: EOMI, PERRLA


ENT: hearing grossly normal, normal oropharynx (visit is)





- Neck


Neck: no lymphadenopathy


Thyroid: bilateral: normal size





- Respiratory


Respiratory: bilateral: diminished





- Cardiovascular


Rhythm: regular


Heart sounds: normal: S1, S2





- Gastrointestinal


General gastrointestinal: normal bowel sounds, soft





- Integumentary


Integumentary: normal





- Neurologic


Neurologic: CNII-XII intact





- Musculoskeletal


Musculoskeletal: generalized weakness, strength equal bilaterally





- Psychiatric


Psychiatric: A&O x's 3





Results


Results: 





 





nick


CBC & Chem 7: 


                                 10/02/22 04:57





                                 10/02/22 04:57


Labs: 


                  Abnormal Lab Results - Last 24 Hours (Table)











  10/02/22 10/02/22 Range/Units





  04:57 04:57 


 


RBC  2.51 L   (4.40-5.60)  X 10*6/uL


 


Hgb  9.3 L   (13.0-17.0)  g/dL


 


Hct  28.5 L   (39.6-50.0)  %


 


MCV  113.5 H   (80.0-97.0)  fL


 


MCH  37.1 H   (27.0-32.0)  pg


 


RDW  18.6 H   (11.5-14.5)  %


 


Plt Count  89 L   (140-440)  X 10*3/uL


 


Immature Gran #  0.06 H   (0.00-0.04)  X 10*3/uL


 


Lymphocytes #  0.61 L   (0.90-5.00)  X 10*3/uL


 


Eosinophils #  0 L   (0.04-0.35)  X 10*3/uL


 


Sodium   132 L  (135-145)  mmol/L


 


Carbon Dioxide   17.4 L  (20.0-27.5)  mmol/L


 


Glucose   174 H  ()  mg/dL


 


Calcium   8.4 L  (8.7-10.3)  mg/dL


 


Total Bilirubin   <0.15 L  (0.30-1.20)  mg/dL


 


AST   45 H  (14-35)  U/L


 


Albumin/Globulin Ratio   1.35 L  (1.60-3.17)  g/dL











Comments: 





EKG reviewed


CT scan - chest: image reviewed


CT scan - pelvis: image reviewed





Assessment and Plan


(1) Abdominal pain


Narrative/Plan: 


 the patient has been having issues with abdominal pain now for some months 

without a clear etiology.  He was extensively evaluated when he was in the 

hospital previously with no etiology found.  This was therefore presumed to be 

chemotherapy effect.  However patient continues to have the same complains and 

without any definite relation to when he gets chemotherapy.  In addition he does

 not have any diarrhea which would typically be expected with colitis related to

 his regimen.


 Patient had CT scans most recently on 9/23/22 which showed no findings that 

would explain his lower and mid abdominal pain.


- Patient's pain has gotten worse recently, as he had not been able to get his 

fentanyl prescription on time, and use of his morphine prior to the end plate of

 the prescription.  He'll get started back on the fentanyl, but states that it 

is not working.


- Consult GI for endoscopic evaluation for possible etiology.


-Continue fentanyl, with IV short-acting morphine for breakthrough.  Consult 

pain management service to adjust his medications for home, depending on his 

need for the short acting medication


Current Visit: No   Status: Acute   Priority: High   Code(s): R10.9 - 

UNSPECIFIED ABDOMINAL PAIN   SNOMED Code(s): 30301029


   





(2) COPD exacerbation


Narrative/Plan: 


 the patient has chronic shortness of breath, but does not have oxygen at home, 

because of coverage issues.  His breathing was worse during this admission and 

he was admitted also for COPD exacerbation.  Deferred to the admitting service 

and pulmonary medicine for management


Current Visit: Yes   Status: Acute   Code(s): J44.1 - CHRONIC OBSTRUCTIVE 

PULMONARY DISEASE W (ACUTE) EXACERBATION   SNOMED Code(s): 203556801


   





(3) Primary small cell malignant neoplasm of lung, stage 4


Narrative/Plan: 


diagnostic and therapeutic circumstances as described.  The patient has been 

prescribed a new regimen but has not yet started that.  He was scheduled to see 

radiation oncology this week for palliative radiation to a new paraspinal masses

 which are felt to be the cause of his left flank and back pain and possibly 

some of the upper abdominal pain.  These are however unlikely to be the cause of

 his middle and lower abdominal pain, has a symptoms predate the findings of 

these masses by some months


- consult radiation oncology


Current Visit: No   Status: Acute   Code(s): C34.90 - MALIGNANT NEOPLASM OF UNSP

 PART OF UNSP BRONCHUS OR LUNG   SNOMED Code(s): 33903753384712

## 2022-10-02 NOTE — P.HPIM
History of Present Illness


H&P Date: 10/02/22


Chief Complaint: Rib pain and back pain





Patient is a 62-year-old male with a known history of lung cancer with mets to 

bone/ribs, currently undergoing chemotherapy, biliary dyskinesis, GERD, COPD, 

anxiety/depression and prior history of smoking presents to ER with complaints 

of pain mainly in the left posterior rib and back pain.  Patient has been having

pain for several weeks.  Patient is also complaining of dyspnea and also 

anxious.  Patient did take breathing treatments today without much improvement. 

Denies any falls.  No complaints of chest pain.  No cough or sputum production. 

No fever no chills.  No nausea vomiting abdominal pain or diarrhea.  Denies any 

dysuria or hematuria.  No bowel or bladder incontinence.


CTA chest showed no evidence of PE.  Lung mass at the left pulmonary hilum with 

encasement of the left lower lobe pulmonary artery which has progressed compared

to old exam.  Left upper lobe masslike infiltrate slightly decreased in size 

compared to old exam.


Massive mediastinal adenopathy without change.  There is a right middle lobe 

nodule which is new compared to old exam.  And could be metastatic disease.  

There is a left adrenal mass increased compared to old exam consistent with 

metastatic disease.


CT of the abdomen pelvis showed pericardial effusion is slightly increased 

compared to last exam.  Masses in the left cardiac border are the same or 

decreases.





Laboratory pressure WBC 7.9 hemoglobin 8.7 platelets 90


Sodium 126 potassium 4.4 chloride 96 bicarb is 15 BUN 39 creatinine 0.51 and 

blood sugar is 205


Neurology has evaluated proBNP 182 and troponin x1 negative urinalysis is 

negative for infection.








Review of Systems





Constitutional: Patient denies any fever or chills . no Generalized weakness.


Abdomen: Patient denied any nausea or vomiting or abd. pain


Cardiovascular: Patient denies any chest pain or short of breath no 

palpitations.


Respiratory: patient denied any cough . no sputum production.  No shortness of 

breath


Neurologic: Patient denied any numbness or tingling headache.


Musculoskeletal: Patient denies any complaints of joint swelling or deformity.  

Patient does have left-sided rib pains and chest pain.


Skin: Negative


Psychiatric: Negative


Endocrine: No heat or cold intolerance.  No recent weight gain.


Genitourinary: No dysuria or hematuria.


All other 14 point ROS negative except the above





Past Medical History


Past Medical History: Cancer, COPD, GERD/Reflux


Additional Past Medical History / Comment(s): LUNG CANCER WITH TUMOR IN STOMACH 

AND STATES ALSO IN HIS NECK.,.  RECEIVING CHEMOTHERAPY., DDD WITH BACK ,HIP & 

LEG PAIN., (PAIN CLINIC PATIENT)., EMPHYSEMA., GASTRITIS.


History of Any Multi-Drug Resistant Organisms: None Reported


Past Surgical History: Joint Replacement, Orthopedic Surgery


Additional Past Surgical History / Comment(s): RIGHT AND LEFT TOTAL HIPS,  RIGHT

SHOULDER SURGERY.


Past Anesthesia/Blood Transfusion Reactions: No Reported Reaction


Past Psychological History: Anxiety, Depression


Smoking Status: Former smoker


Past Alcohol Use History: None Reported


Past Drug Use History: None Reported





- Past Family History


  ** Father


Family Medical History: No Reported History





Medications and Allergies


                                Home Medications











 Medication  Instructions  Recorded  Confirmed  Type


 


Atorvastatin [Lipitor] 20 mg PO DAILY 06/25/21 10/01/22 History


 


OXcarbazepine [Trileptal] 300 mg PO BID 06/25/21 10/01/22 History


 


Pantoprazole Sodium [Protonix] 40 mg PO BID 06/25/21 10/01/22 History


 


Sucralfate [Carafate] 1 gm PO ACHS 06/25/21 10/01/22 History


 


traZODone HCL [Desyrel] 100 mg PO HS 06/25/21 10/01/22 History


 


ALPRAZolam [Xanax] 0.5 mg PO BID PRN 04/29/22 10/01/22 History


 


Budesonide [Pulmicort] 0.25 mg INHALATION RT-BID 04/29/22 10/01/22 History


 


Hydrocortisone [Cortef] 10 mg PO DAILY@1400 04/29/22 10/01/22 History


 


Ipratropium-Albuterol Nebulize 3 ml INHALATION RT-QID PRN 04/29/22 10/01/22 

History





[Duoneb 0.5 mg-3 mg/3 ml Soln]    


 


metFORMIN  mg PO BID 04/29/22 10/01/22 History


 


Albuterol Nebulized [Ventolin 2.5 mg INHALATION RT-QID PRN 06/09/22 10/01/22 

History





Nebulized]    


 


Fluconazole [Diflucan] 100 mg PO DAILY PRN 06/09/22 10/01/22 History


 


Insulin Aspart [NovoLOG Flexpen] See Protocol SQ ACHS 06/10/22 10/01/22 History


 


Dicyclomine [Bentyl] 10 mg PO TID PRN 10 Days #30 07/27/22 10/01/22 Rx





 capsule   


 


Famotidine 40 mg PO DAILY 07/27/22 10/01/22 History


 


Metoclopramide [Reglan] 5 mg PO AC-BID PRN 07/27/22 10/01/22 History


 


Semaglutide [Rybelsus] 3 mg PO DAILY 07/27/22 10/01/22 History


 


Gabapentin [Neurontin] 100 mg PO BID 30 Days #60 cap 09/29/22 10/01/22 Rx


 


Hydrocortisone [Cortef] 20 mg PO DAILY@0800 10/01/22 10/01/22 History


 


Morphine Sulfate ER [Ms Contin] 30 mg PO Q12HR PRN 10/01/22 10/01/22 History


 


fentaNYL 25MCG/HR PATCH [Duragesic 1 patch TRANSDERM Q72H 10/01/22 10/01/22 

History





25MCG/HR]    


 


polyethylene glycoL 3350 [Miralax] 17 gm PO DAILY PRN 10/01/22 10/01/22 History








                                    Allergies











Allergy/AdvReac Type Severity Reaction Status Date / Time


 


No Known Allergies Allergy   Verified 10/01/22 21:46














Physical Exam


Vitals: 


                                   Vital Signs











  Temp Pulse Pulse Resp BP BP Pulse Ox


 


 10/02/22 08:21   110 H     


 


 10/02/22 08:09   110 H     


 


 10/02/22 07:27   111 H   18  113/69   100


 


 10/02/22 01:32   117 H     


 


 10/02/22 01:21   114 H     


 


 10/02/22 01:10  98.9 F   120 H  22   142/80  97


 


 10/01/22 23:40   116 H   20  125/73   97


 


 10/01/22 21:24   121 H   26 H  137/87   97


 


 10/01/22 20:28   117 H      98


 


 10/01/22 20:03   120 H      94 L


 


 10/01/22 19:43   124 H     


 


 10/01/22 19:28   110 H     


 


 10/01/22 17:11   123 H   25 H  126/76   97


 


 10/01/22 16:54   108 H     


 


 10/01/22 16:47   100     


 


 10/01/22 16:11  98 F  130 H   38 H  146/91   96








                                Intake and Output











 10/01/22 10/02/22 10/02/22





 22:59 06:59 14:59


 


Output Total  500 


 


Balance  -500 


 


Output:   


 


  Urine  500 


 


Other:   


 


  Weight 77.564 kg  














PHYSICAL EXAMINATION: 


Patient is lying in the bed comfortably, no acute distress, awake alert and 

oriented.. 


HEENT: Normocephalic. Neck is supple. Pupils reactive. Nostrils clear. Oral 

cavity is moist. 


Neck reveals no JVD, carotid bruits, or thyromegaly. 


CHEST EXAMINATION: Trachea is central. Symmetrical expansion. Lung fields clear 

to auscultation and percussion. 


CARDIAC: Normal S1, S2 with no gallops. No murmurs 


ABDOMEN: Soft. Bowel sounds present.  Nontender.  No organomegaly. No abdominal 

bruits. 


Extremities: reveal no edema.  No clubbing or cyanosis


Tenderness of the left rib cage.


Neurologically awake, alert, oriented x3 with well-coordinated movements.  No 

focal deficits noted


Skin: No rash or skin lesions. 


Psychiatric: Coperative.  Nonsuicidal,


Musculoskeletal: No joint swelling or deformity.  Normal range of motion.








Results


CBC & Chem 7: 


                                 10/09/22 08:07





                                 10/09/22 08:07


Labs: 


                  Abnormal Lab Results - Last 24 Hours (Table)











  10/01/22 10/01/22 10/01/22 Range/Units





  16:43 16:43 16:43 


 


RBC  2.38 L    (4.30-5.90)  m/uL


 


Hgb  8.7 L    (13.0-17.5)  gm/dL


 


Hct  26.3 L    (39.0-53.0)  %


 


MCV  110.6 H    (80.0-100.0)  fL


 


MCH  36.5 H    (25.0-35.0)  pg


 


RDW  17.6 H    (11.5-15.5)  %


 


Plt Count  90 L D    (150-450)  k/uL


 


Immature Gran #     (0.00-0.04)  X 10*3/uL


 


Lymphocytes #  0.7 L    (1.0-4.8)  k/uL


 


Eosinophils #     (0.04-0.35)  X 10*3/uL


 


Macrocytosis  Marked A    


 


APTT   32.2 H   (22.0-30.0)  sec


 


Sodium    126 L  (137-145)  mmol/L


 


Chloride    96 L  ()  mmol/L


 


Carbon Dioxide    15 L  (22-30)  mmol/L


 


Creatinine    0.51 L  (0.66-1.25)  mg/dL


 


Glucose    205 H  (74-99)  mg/dL


 


Calcium     (8.7-10.3)  mg/dL


 


Total Bilirubin     (0.30-1.20)  mg/dL


 


AST     (14-35)  U/L


 


Albumin/Globulin Ratio     (1.60-3.17)  g/dL


 


Ur Specific Gravity     (1.001-1.035)  


 


Urine Protein     (Negative)  


 


Urine Glucose (UA)     (Negative)  


 


Urine Ketones     (Negative)  














  10/01/22 10/02/22 10/02/22 Range/Units





  20:28 04:57 04:57 


 


RBC   2.51 L   (4.30-5.90)  m/uL


 


Hgb   9.3 L   (13.0-17.5)  gm/dL


 


Hct   28.5 L   (39.0-53.0)  %


 


MCV   113.5 H   (80.0-100.0)  fL


 


MCH   37.1 H   (25.0-35.0)  pg


 


RDW   18.6 H   (11.5-15.5)  %


 


Plt Count   89 L   (150-450)  k/uL


 


Immature Gran #   0.06 H   (0.00-0.04)  X 10*3/uL


 


Lymphocytes #   0.61 L   (1.0-4.8)  k/uL


 


Eosinophils #   0 L   (0.04-0.35)  X 10*3/uL


 


Macrocytosis     


 


APTT     (22.0-30.0)  sec


 


Sodium    132 L  (137-145)  mmol/L


 


Chloride     ()  mmol/L


 


Carbon Dioxide    17.4 L  (22-30)  mmol/L


 


Creatinine     (0.66-1.25)  mg/dL


 


Glucose    174 H  (74-99)  mg/dL


 


Calcium    8.4 L  (8.7-10.3)  mg/dL


 


Total Bilirubin    <0.15 L  (0.30-1.20)  mg/dL


 


AST    45 H  (14-35)  U/L


 


Albumin/Globulin Ratio    1.35 L  (1.60-3.17)  g/dL


 


Ur Specific Gravity  1.050 H    (1.001-1.035)  


 


Urine Protein  Trace H    (Negative)  


 


Urine Glucose (UA)  3+ H    (Negative)  


 


Urine Ketones  1+ H    (Negative)  














Thrombosis Risk Factor Assmnt





- DVT/VTE Prophylaxis


DVT/VTE Prophylaxis: Pharmacologic Prophylaxis ordered





Assessment and Plan


Assessment: 








Left rib cage and back pain due to metastatic lung disease


Left pulmonary hilum lung mass with multiple mets in the lung increased in size 

compared to previous exam


Adrenal mass likely secondary to metastatic lesion.


Shortness of breath secondary to lung mass


COPD


GERD


Anxiety/depression


Previous history of smoking


Adrenal insufficiency currently on Cortef at home.


DVT prophylaxis with Lovenox subcu.





Plan:


Patient will be current pain management with Dilaudid IV and also on gabapentin.

  Continue with GI DVT prophylaxis.  Continue with home medications and duo 

nebs.  Oncology was consulted.  Prognosis poor at this time.  Continue to follow

 closely.





Time with Patient: Greater than 30

## 2022-10-03 LAB
ANION GAP SERPL CALC-SCNC: 12.1 MMOL/L (ref 10–18)
BASOPHILS # BLD AUTO: 0.02 X 10*3/UL (ref 0–0.1)
BASOPHILS NFR BLD AUTO: 0.3 %
BUN SERPL-SCNC: 21 MG/DL (ref 9–27)
BUN/CREAT SERPL: 23.33 RATIO (ref 12–20)
CALCIUM SPEC-MCNC: 7.9 MG/DL (ref 8.7–10.3)
CHLORIDE SERPL-SCNC: 100 MMOL/L (ref 96–109)
CO2 SERPL-SCNC: 21.9 MMOL/L (ref 20–27.5)
EOSINOPHIL # BLD AUTO: 0.08 X 10*3/UL (ref 0.04–0.35)
EOSINOPHIL NFR BLD AUTO: 1.3 %
ERYTHROCYTE [DISTWIDTH] IN BLOOD BY AUTOMATED COUNT: 2.14 X 10*6/UL (ref 4.4–5.6)
ERYTHROCYTE [DISTWIDTH] IN BLOOD: 18.8 % (ref 11.5–14.5)
GLUCOSE SERPL-MCNC: 96 MG/DL (ref 70–110)
HCT VFR BLD AUTO: 24.5 % (ref 39.6–50)
HGB BLD-MCNC: 8 G/DL (ref 13–17)
IMM GRANULOCYTES BLD QL AUTO: 0.5 %
LYMPHOCYTES # SPEC AUTO: 1.14 X 10*3/UL (ref 0.9–5)
LYMPHOCYTES NFR SPEC AUTO: 18.7 %
MCH RBC QN AUTO: 37.4 PG (ref 27–32)
MCHC RBC AUTO-ENTMCNC: 32.7 G/DL (ref 32–37)
MCV RBC AUTO: 114.5 FL (ref 80–97)
MONOCYTES # BLD AUTO: 1.14 X 10*3/UL (ref 0.2–1)
MONOCYTES NFR BLD AUTO: 18.7 %
NEUTROPHILS # BLD AUTO: 3.7 X 10*3/UL (ref 1.8–7.7)
NEUTROPHILS NFR BLD AUTO: 60.5 %
NRBC BLD AUTO-RTO: 0.5 /100 WBCS (ref 0–0)
PLATELET # BLD AUTO: 85 X 10*3/UL (ref 140–440)
POTASSIUM SERPL-SCNC: 3.3 MMOL/L (ref 3.5–5.5)
SODIUM SERPL-SCNC: 134 MMOL/L (ref 135–145)
WBC # BLD AUTO: 6.11 X 10*3/UL (ref 4.5–10)

## 2022-10-03 RX ADMIN — MORPHINE SULFATE PRN MG: 30 TABLET, FILM COATED, EXTENDED RELEASE ORAL at 05:48

## 2022-10-03 RX ADMIN — IPRATROPIUM BROMIDE AND ALBUTEROL SULFATE PRN ML: .5; 3 SOLUTION RESPIRATORY (INHALATION) at 07:46

## 2022-10-03 RX ADMIN — BUDESONIDE SCH MG: 0.25 SUSPENSION RESPIRATORY (INHALATION) at 07:46

## 2022-10-03 RX ADMIN — IPRATROPIUM BROMIDE AND ALBUTEROL SULFATE PRN ML: .5; 3 SOLUTION RESPIRATORY (INHALATION) at 12:22

## 2022-10-03 RX ADMIN — HYDROCORTISONE SCH MG: 20 TABLET ORAL at 08:34

## 2022-10-03 RX ADMIN — IPRATROPIUM BROMIDE AND ALBUTEROL SULFATE PRN ML: .5; 3 SOLUTION RESPIRATORY (INHALATION) at 15:55

## 2022-10-03 RX ADMIN — HYDROCORTISONE SCH MG: 10 TABLET ORAL at 17:49

## 2022-10-03 RX ADMIN — HYDROMORPHONE HYDROCHLORIDE PRN MG: 1 INJECTION, SOLUTION INTRAMUSCULAR; INTRAVENOUS; SUBCUTANEOUS at 08:35

## 2022-10-03 RX ADMIN — IPRATROPIUM BROMIDE AND ALBUTEROL SULFATE PRN ML: .5; 3 SOLUTION RESPIRATORY (INHALATION) at 19:56

## 2022-10-03 RX ADMIN — FAMOTIDINE SCH MG: 20 TABLET, FILM COATED ORAL at 08:34

## 2022-10-03 RX ADMIN — PANTOPRAZOLE SODIUM SCH MG: 40 TABLET, DELAYED RELEASE ORAL at 20:44

## 2022-10-03 RX ADMIN — PANTOPRAZOLE SODIUM SCH MG: 40 TABLET, DELAYED RELEASE ORAL at 08:34

## 2022-10-03 RX ADMIN — HYDROMORPHONE HYDROCHLORIDE PRN MG: 1 INJECTION, SOLUTION INTRAMUSCULAR; INTRAVENOUS; SUBCUTANEOUS at 12:39

## 2022-10-03 RX ADMIN — HYDROMORPHONE HYDROCHLORIDE PRN MG: 1 INJECTION, SOLUTION INTRAMUSCULAR; INTRAVENOUS; SUBCUTANEOUS at 20:45

## 2022-10-03 RX ADMIN — HYDROMORPHONE HYDROCHLORIDE PRN MG: 1 INJECTION, SOLUTION INTRAMUSCULAR; INTRAVENOUS; SUBCUTANEOUS at 17:49

## 2022-10-03 RX ADMIN — ENOXAPARIN SODIUM SCH MG: 30 INJECTION SUBCUTANEOUS at 08:34

## 2022-10-03 RX ADMIN — ATORVASTATIN CALCIUM SCH MG: 20 TABLET, FILM COATED ORAL at 08:34

## 2022-10-03 RX ADMIN — GABAPENTIN SCH MG: 100 CAPSULE ORAL at 10:32

## 2022-10-03 RX ADMIN — BUDESONIDE SCH MG: 0.25 SUSPENSION RESPIRATORY (INHALATION) at 19:55

## 2022-10-03 RX ADMIN — GABAPENTIN SCH MG: 100 CAPSULE ORAL at 20:44

## 2022-10-03 NOTE — P.CONS
History of Present Illness





- Reason for Consult


Consult date: 10/03/22


back pain, metastatic small cell 


Requesting physician: Morales Freire





- Chief Complaint


left back/flank pain





- History of Present Illness


The patient is a 62-year-old male with a history of the metastatic small cell 

lung cancer of the left lung, diagnosed in June 2021.  He underwent treatment 

with chemotherapy followed by immunotherapy, and had evidence of disease 

progression in March 2022.  At this time, he started carboplatin and Irinotecan.

 His most recent chemotherapy was approximately 2-3 weeks ago.  The patient was 

hospitalized secondary to increased pain and difficulty controlling his pain at 

home.





Following his hospitalization, the patient underwent a repeat CT scan of the 

chest, abdomen and pelvis on October 1.  This revealed increased size of the 

left hilar lesion, as well as stable enlarged mediastinal adenopathy 

bilaterally.  There was an increased size of the left adrenal nodule as well.  

There is increased size of left-sided paraspinal region nodularity.  At this 

time, the patient reports that he has pains that are very sharp and frequent.  

He is currently getting some relief from his pain medication, but during this 

hospital stay he is currently taking Dilaudid, a fentanyl patch and gabapentin. 

The patient reports that the pain is primarily in the left flank, and that he 

also has some generalized abdominal pain as well.  These 2 areas of pain do not 

always correlate.  His current pain is approximately 4 out of 10, and at the 

time of my visit he is about to take his next Dilaudid.








Review of Systems


Constitutional: Denies chills, Denies fever


Eyes: denies blurred vision


Ears, nose, mouth and throat: Denies headache


Respiratory: Denies congestion, Denies cough


Gastrointestinal: Reports abdominal pain


Genitourinary: Reports flank pain


Musculoskeletal: Denies arm numbness/tingling, Denies frequent falls


Neurological: Denies ataxia, Denies confusion


Psychiatric: Denies anxiety





Past Medical History


Past Medical History: Cancer, COPD, GERD/Reflux


Additional Past Medical History / Comment(s): LUNG CANCER WITH TUMOR IN STOMACH 

AND STATES ALSO IN HIS NECK.,.  RECEIVING CHEMOTHERAPY., DDD WITH BACK ,HIP & 

LEG PAIN., (PAIN CLINIC PATIENT)., EMPHYSEMA., GASTRITIS.


History of Any Multi-Drug Resistant Organisms: None Reported


Past Surgical History: Joint Replacement, Orthopedic Surgery


Additional Past Surgical History / Comment(s): RIGHT AND LEFT TOTAL HIPS,  RIGHT

SHOULDER SURGERY.


Past Anesthesia/Blood Transfusion Reactions: No Reported Reaction


Past Psychological History: Anxiety, Depression


Smoking Status: Former smoker


Past Alcohol Use History: None Reported


Past Drug Use History: None Reported





- Past Family History


  ** Father


Family Medical History: No Reported History





Medications and Allergies


                                Home Medications











 Medication  Instructions  Recorded  Confirmed  Type


 


Atorvastatin [Lipitor] 20 mg PO DAILY 06/25/21 10/01/22 History


 


OXcarbazepine [Trileptal] 300 mg PO BID 06/25/21 10/01/22 History


 


Pantoprazole Sodium [Protonix] 40 mg PO BID 06/25/21 10/01/22 History


 


Sucralfate [Carafate] 1 gm PO ACHS 06/25/21 10/01/22 History


 


traZODone HCL [Desyrel] 100 mg PO HS 06/25/21 10/01/22 History


 


ALPRAZolam [Xanax] 0.5 mg PO BID PRN 04/29/22 10/01/22 History


 


Budesonide [Pulmicort] 0.25 mg INHALATION RT-BID 04/29/22 10/01/22 History


 


Hydrocortisone [Cortef] 10 mg PO DAILY@1400 04/29/22 10/01/22 History


 


Ipratropium-Albuterol Nebulize 3 ml INHALATION RT-QID PRN 04/29/22 10/01/22 

History





[Duoneb 0.5 mg-3 mg/3 ml Soln]    


 


metFORMIN  mg PO BID 04/29/22 10/01/22 History


 


Albuterol Nebulized [Ventolin 2.5 mg INHALATION RT-QID PRN 06/09/22 10/01/22 

History





Nebulized]    


 


Fluconazole [Diflucan] 100 mg PO DAILY PRN 06/09/22 10/01/22 History


 


Insulin Aspart [NovoLOG Flexpen] See Protocol SQ ACHS 06/10/22 10/01/22 History


 


Dicyclomine [Bentyl] 10 mg PO TID PRN 10 Days #30 07/27/22 10/01/22 Rx





 capsule   


 


Famotidine 40 mg PO DAILY 07/27/22 10/01/22 History


 


Metoclopramide [Reglan] 5 mg PO AC-BID PRN 07/27/22 10/01/22 History


 


Semaglutide [Rybelsus] 3 mg PO DAILY 07/27/22 10/01/22 History


 


Gabapentin [Neurontin] 100 mg PO BID 30 Days #60 cap 09/29/22 10/01/22 Rx


 


Hydrocortisone [Cortef] 20 mg PO DAILY@0800 10/01/22 10/01/22 History


 


Morphine Sulfate ER [Ms Contin] 30 mg PO Q12HR PRN 10/01/22 10/01/22 History


 


fentaNYL 25MCG/HR PATCH [Duragesic 1 patch TRANSDERM Q72H 10/01/22 10/01/22 

History





25MCG/HR]    


 


polyethylene glycoL 3350 [Miralax] 17 gm PO DAILY PRN 10/01/22 10/01/22 History








                                    Allergies











Allergy/AdvReac Type Severity Reaction Status Date / Time


 


No Known Allergies Allergy   Verified 10/01/22 21:46














Physical Exam


Vitals: 


                                   Vital Signs











  Temp Pulse Pulse Resp BP Pulse Ox FiO2


 


 10/03/22 12:32   118 H     


 


 10/03/22 12:22   113 H     


 


 10/03/22 07:57   124 H     


 


 10/03/22 07:49       97 


 


 10/03/22 07:46   118 H     


 


 10/03/22 07:11  98.3 F   128 H  18  99/51  96 


 


 10/03/22 02:36  97.1 F L   106 H  17  111/54  95 


 


 10/02/22 20:36   104 H     


 


 10/02/22 20:24   105 H     95  21


 


 10/02/22 20:21  97.7 F   117 H  19  104/51  94 L 


 


 10/02/22 16:13  97.9 F   117 H  18  104/59  94 L 








                                Intake and Output











 10/02/22 10/03/22 10/03/22





 22:59 06:59 14:59


 


Intake Total 118  360


 


Balance 118  360


 


Intake:   


 


  Oral 118  360


 


Other:   


 


  # Voids 2 2 


 


  Weight 77.564 kg  














- Constitutional


General appearance: no acute distress





- EENT


Eyes: PERRLA


ENT: hearing grossly normal





- Neck


Neck: no lymphadenopathy





- Respiratory


Respiratory: bilateral: CTA





- Cardiovascular


Rhythm: regular





- Gastrointestinal


General gastrointestinal: no distended, no tenderness





- Integumentary


Integumentary: pale





- Neurologic


Neurologic: CNII-XII intact





- Musculoskeletal


Musculoskeletal: strength equal bilaterally





- Psychiatric


Psychiatric: A&O x's 3, appropriate affect





Results


CBC & Chem 7: 


                                 10/03/22 04:55





                                 10/03/22 04:55


Labs: 


                  Abnormal Lab Results - Last 24 Hours (Table)











  10/03/22 10/03/22 Range/Units





  04:55 04:55 


 


RBC  2.14 L   (4.40-5.60)  X 10*6/uL


 


Hgb  8.0 L   (13.0-17.0)  g/dL


 


Hct  24.5 L   (39.6-50.0)  %


 


MCV  114.5 H   (80.0-97.0)  fL


 


MCH  37.4 H   (27.0-32.0)  pg


 


RDW  18.8 H   (11.5-14.5)  %


 


Plt Count  85 L   (140-440)  X 10*3/uL


 


Absolute Nucleated RBC  0.03 H   (0.00-0.00)  X 10*3/uL


 


Monocytes #  1.14 H   (0.20-1.00)  X 10*3/uL


 


NRBC/100 WBC Diff  0.5 H   (0.0-0.0)  /100 WBCS


 


Sodium   134 L  (135-145)  mmol/L


 


Potassium   3.3 L  (3.5-5.5)  mmol/L


 


BUN/Creatinine Ratio   23.33 H  (12.00-20.00)  Ratio


 


Calcium   7.9 L  (8.7-10.3)  mg/dL











CT scan - abdomen: report reviewed, image reviewed


CT scan - chest: report reviewed, image reviewed


CT Scan - head: report reviewed, image reviewed





Assessment and Plan


Assessment: 


The patient is a 62-year-old male with a history of the metastatic small cell 

lung cancer of the left lung, diagnosed in June 2021.  He underwent treatment 

with chemotherapy followed by immunotherapy, and had evidence of disease 

progression in March 2022.  At this time, he started carboplatin and Irinotecan.

  His most recent chemotherapy was approximately 2-3 weeks ago.  The patient was

 hospitalized secondary to increased pain and difficulty controlling his pain at

 home.





Plan: 


1.  Left flank pain:  I discussed with the patient that the left paraspinal 

lesions may be the source of his pain.  These are located in the lower thoracic 

spine.  These are situated such that they may be causing compression of exiting 

nerves.  The patient also has an enlarging left adrenal mass.  I discussed with 

the patient that a palliative course of radiotherapy directed to this area would

 be reasonable.  I explained that small cell lung cancer is typically responsive

 to radiation treatments.  I discussed however that the benefit of this pain 

relief may take 1-2 weeks for full treatment affect.  I discussed the importance

 of finding is stable pain regimen during this hospital stay.





2.  Abdominal pain:  At least based on his imaging, there is no clear etiology 

for the generalized abdominal pain.  The enlarging left adrenal mass does abut 

the celiac plexus, but this may also simply be contributing to his left-sided 

flank pain.





3.  Metastatic SCLC:  As noted above, the patient unfortunately appears to be 

progressing on second line chemotherapy.  Considering this progression, it may 

be reasonable to repeat neuro imaging although the patient is not symptomatic at

 this time.  The patient is agreeable to undergo a palliative course of 

radiotherapy.  We will plan to target the paraspinal lesions, and also may 

include the left adrenal mass considering the location.  I discussed with the 

patient that he would first undergo CT simulation for treatment planning.  I 

explained the treatments to be delivered over approximately 1 week.  I discussed

 possible side effects this treatment includes, but is not limited to; fatigue, 

skin irritation, nausea, esophagitis, and low risk of long-term toxicity 

considering palliative dosing.  The patient was agreeable to move forward with 

treatment as discussed.





Time:  I spent 40 minutes with this patient, of which greater than 50% of that 

time was spent counseling, coordinating care, and reviewing the risks, benefits,

 and all potential complications of radiation. I appreciate the opportunity to 

participate in the care of this patient.





Time with Patient: Greater than 30

## 2022-10-03 NOTE — P.PAINPG
Objective





- Vital Signs


Vital signs: 


                                   Vital Signs











Temp  98.1 F   10/03/22 14:00


 


Pulse  120 H  10/03/22 14:00


 


Resp  16   10/03/22 14:00


 


BP  102/59   10/03/22 14:00


 


Pulse Ox  93 L  10/03/22 14:00


 


FiO2  21   10/02/22 20:24








                                 Intake & Output











 10/02/22 10/03/22 10/03/22





 18:59 06:59 18:59


 


Intake Total 118  360


 


Balance 118  360


 


Weight 77.564 kg  


 


Intake:   


 


  Oral 118  360


 


Other:   


 


  # Voids 2 2 














- Labs


CBC & Chem 7: 


                                 10/03/22 04:55





                                 10/03/22 04:55


Labs: 


                  Abnormal Lab Results - Last 24 Hours (Table)











  10/03/22 10/03/22 Range/Units





  04:55 04:55 


 


RBC  2.14 L   (4.40-5.60)  X 10*6/uL


 


Hgb  8.0 L   (13.0-17.0)  g/dL


 


Hct  24.5 L   (39.6-50.0)  %


 


MCV  114.5 H   (80.0-97.0)  fL


 


MCH  37.4 H   (27.0-32.0)  pg


 


RDW  18.8 H   (11.5-14.5)  %


 


Plt Count  85 L   (140-440)  X 10*3/uL


 


Absolute Nucleated RBC  0.03 H   (0.00-0.00)  X 10*3/uL


 


Monocytes #  1.14 H   (0.20-1.00)  X 10*3/uL


 


NRBC/100 WBC Diff  0.5 H   (0.0-0.0)  /100 WBCS


 


Sodium   134 L  (135-145)  mmol/L


 


Potassium   3.3 L  (3.5-5.5)  mmol/L


 


BUN/Creatinine Ratio   23.33 H  (12.00-20.00)  Ratio


 


Calcium   7.9 L  (8.7-10.3)  mg/dL














PQRS Measure Charge Sheet


Comment: 





HISTORY OF PRESENT ILLNESS:


62 yr old inpatient male as a referral from Dr Freire presents today w severe and 

intractable pain secondary to DDD, spondylosis and facet arthropathy without 

myelopathy for an evaluation. Pt states his pain level is currently at  9/10 in 

intensity, constant and localized in the mid to lower aspect of his lumbar 

spine. Pt states he normally takes pain meds at home but they have been 

ineffective in managing his pain. Fentanyl was initially prescribed once every 

72 hrs but was not managing his pain so it was documented to change to Q72H upon

the next refill date. Pt is also on MS ER 30mg which will remain unchanged at 

this time. Pt is also on Dilaudid 1mg IVP Q3H prn pain but this is only for the 

inpatient status and is not expected to be filled upon discharge.





PMH: Small Cell Carcinoma w metastasis, NIDDM, COPD/ Emphysema, GERD, 

MDD/Anxiety


PSH: BL Total Hip Replacements, R Shoulder Arthroscopy


SH: Former tobacco user, No ETOH abuse, No illicit drug use.


FH: Fa- No Reported History


All: NKDA


Meds: See list





REVIEW OF ORGAN SYSTEMS:


                     CONSTITUTIONAL:  No fevers or chills. No recent weight 

loss.


                     NEUROLOGICAL: +  numbness and tingling along the distal 

extremities. No seizure disorders or headaches.


                     MUSCULOSKELETAL: + pain 


                     PSYCHIATRIC:  Denies current depression or suicidal 

thoughts.


    


Physical Examinations  :


                Constitutional : Cooperative , not in acute distress .          

                                                                                

                                                                                

                                                                                

                                                                                

     


                Neurologic :   Cranial nerve II   to  XII  intact. No focal 

neurological deficits.


                Psychiatric : alert & oriented  x 3. Matching mood & appropriate

affect. Judgment & insight intact. 


                Musculoskeletal :     


                               Cervical Spine 


                                         Motor strength in the deltoid and 

biceps: Normal  right side. Normal  Left side


                                         Motor strength biceps and the wrist 

extensors:   Normal right side . Normal left side 


                                         Motor strength in the triceps muscle: 

Normal right side.  Normal  left side  


                                         Deep tendon reflexes:  Normal at the 

biceps. Normal at Brachioradialis. Normal at triceps


                                         Vertebral body tenderness to deep 

palpation over 


                                         Cervical facet loading test: positive 

bilaterally


                                         Spurling test: positive bilaterally


                                         Neck distraction test: positive 

bilaterally


                                         Elpidio sign: positive bilaterally


                                  Lumbar spine


                                         Motor strength lower extremities ,thigh

and legs  5/5 Right side ,  5/5  Left side 


                                         Deep tendon reflexes :   Normal  Knee 

Jerk. Normal   Ankle Jerk  


                                         Vertebral body tenderness over L2, L3, 

L4, L4


                                         Lumbar facet Loading Test: positive 

Right  / positive Left  


                                         Range of motion of the lumbar spine  

Flexion  30 degrees,   extension   10 degrees


                                         Straight Leg Raise test: Left/ Right 

positive at   degree   


                                         Mirna test: positive right /  positive 

left.


                                         Severe tenderness over the Sacroiliac 

joint  on the Right / Left  sides   


                                         Gaenslen  test: positive bilaterally


                                         Seated flexion test: positive 

bilaterally.


                                 Sacral spine :


                                         Severe tenderness over the Sacroiliac 

joint:  right side / left side 


                                         Range of motion: Flexion of the lumbar 

spine <60 degrees


                                         Range of motion: Extension of the 

lumbar spine <20 degrees


                                         Gaenslen's Test positive 


                                         Jerry's Test positive 


                                         Mirna test: positive  right side /  le

ft side


                                         Thigh Thrust Test


                                         Sacral Thrust Test


Imaging:


CT scan from 9/23/22 reviewed





Assessment/ Plan : 


Lumbar DDD, Lumbar spondylosis


Recommendation of adjusting pain medications. Will increase frequency of 

Fentanyl 25mcg/hr from Q72H to Q48H. Will continue Morphine Sulfate ER at 30mg 

BID. He is currently on Dilaudid IVP q3h prn pain but this is not expected to be

continued post discharge. Risks, benefits discussed and patient verbalized 

understanding.


All questions answered.


I have spent greater than 30 minutes on patient care today. Dr Cummins was 

available by phone for the evaluation of this patient. The time was used to 

review the medical records including relevant urine studies and Prescription 

history (MAPs), review of the available imaging, evaluation and examination of 

the patient, coordination of care with the medical staff and if applicable 

referring physicians, as well as creation of the medical record








- Pain Location


  ** Generalized


Non-Pharmacological Interventions: Darkened Room, Distraction


Pharmacological Interventions: PRN Medication





  ** None


Pharmacological Interventions: Discuss Pain Med Options, PRN Medication


PQRS Narrative: 


                                        





Do You Want the Pneumonia        Vaccine Up to Date


Vaccine AT THIS TIME?            


Narcotic Agreement Date Signed   02/07/22


Blood Pressure [Right Arm        102/59


Sitting]                         


Blood Pressure                   137/60


Pain Intensity [None]            0


Pain Intensity [Generalized]     9


Pain Intensity                   7


Pain Scale Used                  Numeric (1 - 10)


Scale Used                       Numeric (1 - 10)


Hx Alcohol Use (MH)              No








Home Medications: 


Ambulatory Orders





Atorvastatin [Lipitor] 20 mg PO DAILY 06/25/21 


OXcarbazepine [Trileptal] 300 mg PO BID 06/25/21 


Pantoprazole Sodium [Protonix] 40 mg PO BID 06/25/21 


Sucralfate [Carafate] 1 gm PO ACHS 06/25/21 


traZODone HCL [Desyrel] 100 mg PO HS 06/25/21 


ALPRAZolam [Xanax] 0.5 mg PO BID PRN 04/29/22 


Budesonide [Pulmicort] 0.25 mg INHALATION RT-BID 04/29/22 


Hydrocortisone [Cortef] 10 mg PO DAILY@1400 04/29/22 


Ipratropium-Albuterol Nebulize [Duoneb 0.5 mg-3 mg/3 ml Soln] 3 ml INHALATION 

RT-QID PRN 04/29/22 


metFORMIN  mg PO BID 04/29/22 


Albuterol Nebulized [Ventolin Nebulized] 2.5 mg INHALATION RT-QID PRN 06/09/22 


Fluconazole [Diflucan] 100 mg PO DAILY PRN 06/09/22 


Insulin Aspart [NovoLOG Flexpen] See Protocol SQ ACHS 06/10/22 


Dicyclomine [Bentyl] 10 mg PO TID PRN 10 Days #30 capsule 07/27/22 


Famotidine 40 mg PO DAILY 07/27/22 


Metoclopramide [Reglan] 5 mg PO AC-BID PRN 07/27/22 


Semaglutide [Rybelsus] 3 mg PO DAILY 07/27/22 


Gabapentin [Neurontin] 100 mg PO BID 30 Days #60 cap 09/29/22 


Hydrocortisone [Cortef] 20 mg PO DAILY@0800 10/01/22 


Morphine Sulfate ER [Ms Contin] 30 mg PO Q12HR PRN 10/01/22 


fentaNYL 25MCG/HR PATCH [Duragesic 25MCG/HR] 1 patch TRANSDERM Q72H 10/01/22 


polyethylene glycoL 3350 [Miralax] 17 gm PO DAILY PRN 10/01/22 











Controlled Substance Measures





- Controlled Substance Measures


Is patient prescribed a controlled substance at discharge?: No

## 2022-10-04 LAB
ANION GAP SERPL CALC-SCNC: 12.7 MMOL/L (ref 10–18)
BASOPHILS # BLD AUTO: 0.02 X 10*3/UL (ref 0–0.1)
BASOPHILS NFR BLD AUTO: 0.3 %
BUN SERPL-SCNC: 14 MG/DL (ref 9–27)
BUN/CREAT SERPL: 20 RATIO (ref 12–20)
CALCIUM SPEC-MCNC: 8.2 MG/DL (ref 8.7–10.3)
CHLORIDE SERPL-SCNC: 98 MMOL/L (ref 96–109)
CO2 SERPL-SCNC: 22.3 MMOL/L (ref 20–27.5)
EOSINOPHIL # BLD AUTO: 0.07 X 10*3/UL (ref 0.04–0.35)
EOSINOPHIL NFR BLD AUTO: 1 %
ERYTHROCYTE [DISTWIDTH] IN BLOOD BY AUTOMATED COUNT: 2.09 X 10*6/UL (ref 4.4–5.6)
ERYTHROCYTE [DISTWIDTH] IN BLOOD: 18.6 % (ref 11.5–14.5)
GLUCOSE SERPL-MCNC: 98 MG/DL (ref 70–110)
HCT VFR BLD AUTO: 24.9 % (ref 39.6–50)
HGB BLD-MCNC: 7.8 G/DL (ref 13–17)
IMM GRANULOCYTES BLD QL AUTO: 0.6 %
LYMPHOCYTES # SPEC AUTO: 1.28 X 10*3/UL (ref 0.9–5)
LYMPHOCYTES NFR SPEC AUTO: 17.7 %
MACROCYTES BLD QL SMEAR: (no result)
MCH RBC QN AUTO: 37.3 PG (ref 27–32)
MCHC RBC AUTO-ENTMCNC: 31.3 G/DL (ref 32–37)
MCV RBC AUTO: 119.1 FL (ref 80–97)
MONOCYTES # BLD AUTO: 1.8 X 10*3/UL (ref 0.2–1)
MONOCYTES NFR BLD AUTO: 24.8 %
NEUTROPHILS # BLD AUTO: 4.04 X 10*3/UL (ref 1.8–7.7)
NEUTROPHILS NFR BLD AUTO: 55.6 %
NRBC BLD AUTO-RTO: 0.7 /100 WBCS (ref 0–0)
PLATELET # BLD AUTO: 83 X 10*3/UL (ref 140–440)
PLATELETS.RETICULATED NFR BLD AUTO: 5.6 % (ref 1.1–6.1)
POTASSIUM SERPL-SCNC: 3.3 MMOL/L (ref 3.5–5.5)
ROULEAUX BLD QL SMEAR: PRESENT
SODIUM SERPL-SCNC: 133 MMOL/L (ref 135–145)
WBC # BLD AUTO: 7.25 X 10*3/UL (ref 4.5–10)

## 2022-10-04 RX ADMIN — HYDROMORPHONE HYDROCHLORIDE PRN MG: 1 INJECTION, SOLUTION INTRAMUSCULAR; INTRAVENOUS; SUBCUTANEOUS at 23:06

## 2022-10-04 RX ADMIN — HYDROMORPHONE HYDROCHLORIDE PRN MG: 1 INJECTION, SOLUTION INTRAMUSCULAR; INTRAVENOUS; SUBCUTANEOUS at 20:07

## 2022-10-04 RX ADMIN — IPRATROPIUM BROMIDE AND ALBUTEROL SULFATE PRN ML: .5; 3 SOLUTION RESPIRATORY (INHALATION) at 08:46

## 2022-10-04 RX ADMIN — HYDROMORPHONE HYDROCHLORIDE PRN MG: 1 INJECTION, SOLUTION INTRAMUSCULAR; INTRAVENOUS; SUBCUTANEOUS at 05:24

## 2022-10-04 RX ADMIN — BUDESONIDE SCH MG: 0.25 SUSPENSION RESPIRATORY (INHALATION) at 08:46

## 2022-10-04 RX ADMIN — IPRATROPIUM BROMIDE AND ALBUTEROL SULFATE PRN ML: .5; 3 SOLUTION RESPIRATORY (INHALATION) at 15:57

## 2022-10-04 RX ADMIN — GABAPENTIN SCH MG: 100 CAPSULE ORAL at 07:41

## 2022-10-04 RX ADMIN — SUCRALFATE SCH GM: 1 TABLET ORAL at 20:07

## 2022-10-04 RX ADMIN — ENOXAPARIN SODIUM SCH MG: 30 INJECTION SUBCUTANEOUS at 11:56

## 2022-10-04 RX ADMIN — HYDROCORTISONE SCH MG: 10 TABLET ORAL at 13:35

## 2022-10-04 RX ADMIN — GABAPENTIN SCH MG: 100 CAPSULE ORAL at 20:06

## 2022-10-04 RX ADMIN — HYDROCORTISONE SCH MG: 20 TABLET ORAL at 07:42

## 2022-10-04 RX ADMIN — MORPHINE SULFATE PRN MG: 30 TABLET, FILM COATED, EXTENDED RELEASE ORAL at 02:07

## 2022-10-04 RX ADMIN — ATORVASTATIN CALCIUM SCH MG: 20 TABLET, FILM COATED ORAL at 07:41

## 2022-10-04 RX ADMIN — HYDROMORPHONE HYDROCHLORIDE PRN MG: 1 INJECTION, SOLUTION INTRAMUSCULAR; INTRAVENOUS; SUBCUTANEOUS at 17:06

## 2022-10-04 RX ADMIN — IPRATROPIUM BROMIDE AND ALBUTEROL SULFATE PRN ML: .5; 3 SOLUTION RESPIRATORY (INHALATION) at 12:11

## 2022-10-04 RX ADMIN — HYDROMORPHONE HYDROCHLORIDE PRN MG: 1 INJECTION, SOLUTION INTRAMUSCULAR; INTRAVENOUS; SUBCUTANEOUS at 09:34

## 2022-10-04 RX ADMIN — FAMOTIDINE SCH MG: 20 TABLET, FILM COATED ORAL at 07:42

## 2022-10-04 RX ADMIN — MORPHINE SULFATE PRN MG: 30 TABLET, FILM COATED, EXTENDED RELEASE ORAL at 13:35

## 2022-10-04 RX ADMIN — IPRATROPIUM BROMIDE AND ALBUTEROL SULFATE PRN ML: .5; 3 SOLUTION RESPIRATORY (INHALATION) at 20:33

## 2022-10-04 RX ADMIN — KETOROLAC TROMETHAMINE PRN MG: 15 INJECTION, SOLUTION INTRAMUSCULAR; INTRAVENOUS at 11:57

## 2022-10-04 RX ADMIN — PANTOPRAZOLE SODIUM SCH MG: 40 TABLET, DELAYED RELEASE ORAL at 07:41

## 2022-10-04 RX ADMIN — SUCRALFATE SCH GM: 1 TABLET ORAL at 10:23

## 2022-10-04 RX ADMIN — PANTOPRAZOLE SODIUM SCH MG: 40 TABLET, DELAYED RELEASE ORAL at 20:07

## 2022-10-04 RX ADMIN — BUDESONIDE SCH MG: 0.25 SUSPENSION RESPIRATORY (INHALATION) at 20:33

## 2022-10-04 RX ADMIN — SUCRALFATE SCH GM: 1 TABLET ORAL at 11:57

## 2022-10-04 RX ADMIN — SUCRALFATE SCH GM: 1 TABLET ORAL at 17:05

## 2022-10-04 NOTE — P.PN
Subjective


Progress Note Date: 10/04/22


This is a 62-year-old gentleman admitted with abdominal pain radiating around to

bilateral flanks, lower back and between scapulas due to metastatic lung disease

in a patient with history of primary small cell malignant neoplasm of lung, 

stage IV.  He reports extensive workup of abdominal pain and per oncology 

attributing it to chemo effect.  Good diet intake with no nausea or vomiting- 

states pain is unaffected by diet intake, ongoing sharp, continuous.  Reports 

Last chemo completed approximately 2 weeks ago and had not yet met with 

radiation oncology outpatient.  Reports current pain management at home not 

working.  Pain rated at 9-10. Pain management team as well as radiation oncology

consult in place.  Afebrile, normal WBC.  Potassium 3.3, replacement protocol 

ordered.  Hemoglobin decreased to 8.








10/04/2022 evaluated by oncology radiation and pain management team with 

recommendations noted and appreciated.  Scheduled for radiation this morning.  

Sitting up at bedside, eating breakfast.  Denies nausea vomiting or diarrhea.  

Reports normal bowel movements, nonbloody, not black or darkened. Reports Dr. Freire recommending outpatient scope, but having difficulties with transportation 

secondary to being in [facility].  Currently pain better controlled.  

Hemoglobin 7.8, platelets decreased A, potassium 3.3, replaced.  Denies chest 

pain, palpitations or shortness of breath.





Objective





- Vital Signs


Vital signs: 


                                   Vital Signs











Temp  98.5 F   10/04/22 14:00


 


Pulse  76   10/04/22 14:00


 


Resp  16   10/04/22 14:00


 


BP  144/80   10/04/22 14:00


 


Pulse Ox  92 L  10/04/22 14:00


 


FiO2  21   10/02/22 20:24








                                 Intake & Output











 10/03/22 10/04/22 10/04/22





 18:59 06:59 18:59


 


Intake Total 720  240


 


Balance 720  240


 


Intake:   


 


  Oral 720  240


 


Other:   


 


  # Voids 1 1 














- Exam


PHYSICAL EXAM:


VITAL SIGNS: [As above]


GENERAL: Alert and oriented 3, Sitting up at side of bed, no acute distress


HEENT:  Conjunctivae normal. eyes normal. MMM.


NECK: Supple, No JVD.


CARDIOVASCULAR:  S1, S2 regular, tachycardic. No murmur.


RESPIRATION: Breath sounds diminished in the bases. No rhonchi or crackles. 


ABDOMEN:  Soft, nontender, No guarding.Bowel sounds heard.


LEGS:  No edema. no swelling.


PSYCHIATRY: Alert and oriented X3, mood and affect normal.


NERVOUS SYSTEM:  Cranial N 2-12 grossly normal.No focal deficits. Strength and 

sensation grossly intact.


Skin: Warm and dry, no rash 














- Labs


CBC & Chem 7: 


                                 10/04/22 05:17





                                 10/04/22 05:17


Labs: 


                  Abnormal Lab Results - Last 24 Hours (Table)











  10/04/22 10/04/22 Range/Units





  05:17 05:17 


 


RBC  2.09 L   (4.40-5.60)  X 10*6/uL


 


Hgb  7.8 L   (13.0-17.0)  g/dL


 


Hct  24.9 L   (39.6-50.0)  %


 


MCV  119.1 H   (80.0-97.0)  fL


 


MCH  37.3 H   (27.0-32.0)  pg


 


MCHC  31.3 L   (32.0-37.0)  g/dL


 


RDW  18.6 H   (11.5-14.5)  %


 


Plt Count  83 L   (140-440)  X 10*3/uL


 


Plt Count Comment  DECREASED A   


 


Absolute Nucleated RBC  0.05 H   (0.00-0.00)  X 10*3/uL


 


Monocytes #  1.80 H   (0.20-1.00)  X 10*3/uL


 


NRBC/100 WBC Diff  0.7 H   (0.0-0.0)  /100 WBCS


 


Sodium   133 L  (135-145)  mmol/L


 


Potassium   3.3 L  (3.5-5.5)  mmol/L


 


Calcium   8.2 L  (8.7-10.3)  mg/dL














Assessment and Plan


Assessment: 





Abdominal pain  radiating around to bilateral flanks, lower back and between 

scapulas due to metastatic lung disease in a patient with history of primary 

small cell malignant neoplasm of lung, stage IV.


Left pulmonary hilum lung mass with multiple mets in the lung increased in size 

compared to previous exam


Adrenal mass likely secondary to metastatic lesion.


Shortness of breath secondary to lung mass


COPD


GERD


Anxiety/depression


Previous history of smoking


Adrenal insufficiency currently on Cortef at home.





Plan: Continue on current medication regime ,monitoring and symptomatic 

treatment.  Electrolyte replacement completed.  Pain management as per pain 

management team; scheduled for palliative radiation today.  Close monitoring of 

CBC, electrolytes with repeat labs ordered for a.m. Prognosis guarded given mult

iple complex medical issues.














The impression and plan of care has been dictated as directed.





:


I performed a history and examination of this patient,  discussed the same with 

the dictator.  I agree with the dictator's note ,documented as a scribe.  Any 

additional findings or plans will be noted.

## 2022-10-04 NOTE — P.PN
Subjective


Progress Note Date: 10/03/22


This is a 62-year-old gentleman admitted with abdominal pain radiating around to

bilateral flanks, lower back and between scapulas due to metastatic lung disease

in a patient with history of primary small cell malignant neoplasm of lung, 

stage IV.  He reports extensive workup of abdominal pain and per oncology 

attributing it to chemo effect.  Good diet intake with no nausea or vomiting- 

states pain is unaffected by diet intake, ongoing sharp, continuous.  Reports 

Last chemo completed approximately 2 weeks ago and had not yet met with 

radiation oncology outpatient.  Reports current pain management at home not 

working.  Pain rated at 9-10. Pain management team as well as radiation oncology

consult in place.  Afebrile, normal WBC.  Potassium 3.3, replacement protocol 

ordered.  Hemoglobin decreased to 8.








Objective





- Vital Signs


Vital signs: 


                                   Vital Signs











Temp  98.1 F   10/03/22 14:00


 


Pulse  119 H  10/03/22 16:05


 


Resp  16   10/03/22 14:00


 


BP  102/59   10/03/22 14:00


 


Pulse Ox  93 L  10/03/22 14:00


 


FiO2  21   10/02/22 20:24








                                 Intake & Output











 10/02/22 10/03/22 10/03/22





 18:59 06:59 18:59


 


Intake Total 118  360


 


Balance 118  360


 


Weight 77.564 kg  


 


Intake:   


 


  Oral 118  360


 


Other:   


 


  # Voids 2 2 














- Exam


PHYSICAL EXAM:


VITAL SIGNS: [As above]


GENERAL: Alert and oriented 3, Sitting up at side of bed, no acute distress


HEENT:  Conjunctivae normal. eyes normal. 


NECK:  No JVD. No thyroid enlargement. No LNs


CARDIOVASCULAR:  S1, S2 regular.. No murmur


RESPIRATION: Breath sounds diminished in the bases. No rhonchi or crackles. No 

bronchial breathing.


ABDOMEN:  Soft, diffuse tenderness radiating around to lower back .No 

guarding.Bowel sounds heard.


LEGS:  No edema. no swelling 


PSYCHIATRY: Alert and oriented X3, mood and affect normal.


NERVOUS SYSTEM:  Cranial N 2-12 grossly normal.No focal deficits. Strength and 

sensation grossly intact.


Skin: Warm and dry, no rash 














- Labs


CBC & Chem 7: 


                                 10/04/22 05:17





                                 10/04/22 05:17


Labs: 


                  Abnormal Lab Results - Last 24 Hours (Table)











  10/03/22 10/03/22 Range/Units





  04:55 04:55 


 


RBC  2.14 L   (4.40-5.60)  X 10*6/uL


 


Hgb  8.0 L   (13.0-17.0)  g/dL


 


Hct  24.5 L   (39.6-50.0)  %


 


MCV  114.5 H   (80.0-97.0)  fL


 


MCH  37.4 H   (27.0-32.0)  pg


 


RDW  18.8 H   (11.5-14.5)  %


 


Plt Count  85 L   (140-440)  X 10*3/uL


 


Absolute Nucleated RBC  0.03 H   (0.00-0.00)  X 10*3/uL


 


Monocytes #  1.14 H   (0.20-1.00)  X 10*3/uL


 


NRBC/100 WBC Diff  0.5 H   (0.0-0.0)  /100 WBCS


 


Sodium   134 L  (135-145)  mmol/L


 


Potassium   3.3 L  (3.5-5.5)  mmol/L


 


BUN/Creatinine Ratio   23.33 H  (12.00-20.00)  Ratio


 


Calcium   7.9 L  (8.7-10.3)  mg/dL














Assessment and Plan


Assessment: 





Abdominal pain  radiating around to bilateral flanks, lower back and between 

scapulas due to metastatic lung disease in a patient with history of primary 

small cell malignant neoplasm of lung, stage IV.


Left pulmonary hilum lung mass with multiple mets in the lung increased in size 

compared to previous exam


Adrenal mass likely secondary to metastatic lesion.


Shortness of breath secondary to lung mass


COPD


GERD


Anxiety/depression


Previous history of smoking


Adrenal insufficiency currently on Cortef at home.





Plan: Continue on current medication regime ,monitoring and symptomatic 

treatment.  Electrolyte replacement orders for potassium, magnesium level 

ordered/pending. Pain management; pain management team, radiation oncology 

consult in place.  Prognosis guarded given multiple complex medical issues.














The impression and plan of care has been dictated as directed.





:


I performed a history and examination of this patient,  discussed the same with 

the dictator.  I agree with the dictator's note ,documented as a scribe.  Any 

additional findings or plans will be noted.

## 2022-10-04 NOTE — P.GSCN
History of Present Illness


Consult date: 10/04/22


History of present illness: 





CHIEF COMPLAINT: Abdominal pain





HISTORY OF PRESENT ILLNESS: This is a 62-year-old male with a history of 

metastatic small cell lung cancer of the left lung diagnosed in June 2021.  He 

does have known metastatic disease to the spine.  He reports finishing chemo 

about 3 weeks ago.  He is followed by oncology service and the radiation oncolog

ist.  Patient's initially presented with back pain and left flank pain.  He also

reports that he's been having uncontrollable mid lower abdominal pain for 

months.  Patient reports that all of his symptoms are not new however he could 

not control his pain at home.  He therefore came into the hospital for further 

evaluation.  He reports that his pain in the abdomen can become sharp and rates 

it about a 10 out of 10.  He has had some nausea.  He reports having normal 

bowel movements.  Denies any blood or black stools.  He reports his last EGD and

colonoscopy was greater than 10 years ago at that time had reported gastritis.  

Oncology service has requested surgical evaluation for EGD and colonoscopy for 

further evaluation of patient's abdominal pain.  Patient reports a decreased 

appetite at home.  But since hospitalized able to tolerate diet better.





Patient seen and examined with Dr. avendano





PAST MEDICAL HISTORY: 


Lung cancer, COPD, GERD, degenerative disc disease, anxiety and depression





PAST SURGICAL HISTORY: 


See list.





MEDICATIONS: 


See list.





ALLERGIES: 


See list.





SOCIAL HISTORY: No illicit drug use.  





REVIEW OF SYSTEMS: 


CONSTITUTIONAL: Denies fever or chills.


HEENT: Denies blurred vision, vision changes, or eye pain. Denies hemoptysis 


CARDIOVASCULAR: Denies chest pain or pressure.


RESPIRATORY: No shortness of breath. 


GASTROINTESTINAL: See HPI for pertinent findings


HEMATOLOGIC: Denies bleeding disorders.


GENITOURINARY:  Denies any blood in urine or increased urinary frequency.  


SKIN: Denies pruitis. Denies rash.





PHYSICAL EXAM: 


VITAL SIGNS: Reviewed


GENERAL: Well-developed in no acute distress. 


HEENT:  No sclera icterus. Extraocular movements grossly intact.  Moist buccal 

mucosa. Head is atraumatic, normocephalic. No nasal drainage.


ABDOMEN:  Soft.   Nondistended.  Nontender


NEUROLOGIC:  Alert and oriented.  Cranial nerves II through XII grossly intact.





LABORATORY DATA:


WBC is 7.25 Hgb 7.8 platelets are 83


Sodium 133 potassium is 3.3 creatinine 0.7





IMAGING:


Computed tomography scan abdomen and pelvis pericardial effusion is slightly 

increased compared to last exam.  Masses at the left cardiac border are the same

or increased.  Left adrenal mass slightly increased.  Retrocrural metastatic 

nodules on the left paraspinal region slightly increased.





ASSESSMENT: 


1.  Abdominal pain


2.  Lung cancer with metastatic disease


3.  Hypokalemia





PLAN: 


-Patient scheduled for EGD and colonoscopy on Thursday, 10/06/2022 with Dr. avendano


-Start full liquid diet tonight


-Start clear liquid diet tomorrow morning


-Start GoLYTELY prep tomorrow morning


-Potassium replaced


-Repeat labs in a.m.





Thank you for this consultation





Physician Assistant note has been reviewed by physician. Signing provider agrees

with the documented findings, assessment, and plan of care. 





Past Medical History


Past Medical History: Cancer, COPD, GERD/Reflux


Additional Past Medical History / Comment(s): LUNG CANCER WITH TUMOR IN STOMACH 

AND STATES ALSO IN HIS NECK.,.  RECEIVING CHEMOTHERAPY., DDD WITH BACK ,HIP & 

LEG PAIN., (PAIN CLINIC PATIENT)., EMPHYSEMA., GASTRITIS.


History of Any Multi-Drug Resistant Organisms: None Reported


Past Surgical History: Joint Replacement, Orthopedic Surgery


Additional Past Surgical History / Comment(s): RIGHT AND LEFT TOTAL HIPS,  RIGHT

SHOULDER SURGERY.


Past Anesthesia/Blood Transfusion Reactions: No Reported Reaction


Past Psychological History: Anxiety, Depression


Smoking Status: Former smoker


Past Alcohol Use History: None Reported


Past Drug Use History: None Reported





- Past Family History


  ** Father


Family Medical History: No Reported History





Medications and Allergies


                                Home Medications











 Medication  Instructions  Recorded  Confirmed  Type


 


Atorvastatin [Lipitor] 20 mg PO DAILY 06/25/21 10/01/22 History


 


OXcarbazepine [Trileptal] 300 mg PO BID 06/25/21 10/01/22 History


 


Pantoprazole Sodium [Protonix] 40 mg PO BID 06/25/21 10/01/22 History


 


Sucralfate [Carafate] 1 gm PO ACHS 06/25/21 10/01/22 History


 


traZODone HCL [Desyrel] 100 mg PO HS 06/25/21 10/01/22 History


 


ALPRAZolam [Xanax] 0.5 mg PO BID PRN 04/29/22 10/01/22 History


 


Budesonide [Pulmicort] 0.25 mg INHALATION RT-BID 04/29/22 10/01/22 History


 


Hydrocortisone [Cortef] 10 mg PO DAILY@1400 04/29/22 10/01/22 History


 


Ipratropium-Albuterol Nebulize 3 ml INHALATION RT-QID PRN 04/29/22 10/01/22 Hi

story





[Duoneb 0.5 mg-3 mg/3 ml Soln]    


 


metFORMIN  mg PO BID 04/29/22 10/01/22 History


 


Albuterol Nebulized [Ventolin 2.5 mg INHALATION RT-QID PRN 06/09/22 10/01/22 

History





Nebulized]    


 


Fluconazole [Diflucan] 100 mg PO DAILY PRN 06/09/22 10/01/22 History


 


Insulin Aspart [NovoLOG Flexpen] See Protocol SQ ACHS 06/10/22 10/01/22 History


 


Dicyclomine [Bentyl] 10 mg PO TID PRN 10 Days #30 07/27/22 10/01/22 Rx





 capsule   


 


Famotidine 40 mg PO DAILY 07/27/22 10/01/22 History


 


Metoclopramide [Reglan] 5 mg PO AC-BID PRN 07/27/22 10/01/22 History


 


Semaglutide [Rybelsus] 3 mg PO DAILY 07/27/22 10/01/22 History


 


Gabapentin [Neurontin] 100 mg PO BID 30 Days #60 cap 09/29/22 10/01/22 Rx


 


Hydrocortisone [Cortef] 20 mg PO DAILY@0800 10/01/22 10/01/22 History


 


Morphine Sulfate ER [Ms Contin] 30 mg PO Q12HR PRN 10/01/22 10/01/22 History


 


fentaNYL 25MCG/HR PATCH [Duragesic 1 patch TRANSDERM Q72H 10/01/22 10/01/22 

History





25MCG/HR]    


 


polyethylene glycoL 3350 [Miralax] 17 gm PO DAILY PRN 10/01/22 10/01/22 History








                                    Allergies











Allergy/AdvReac Type Severity Reaction Status Date / Time


 


No Known Allergies Allergy   Verified 10/01/22 21:46














Surgical - Exam


                                   Vital Signs











Temp Pulse Resp BP Pulse Ox


 


 98 F   130 H  38 H  146/91   96 


 


 10/01/22 16:11  10/01/22 16:11  10/01/22 16:11  10/01/22 16:11  10/01/22 16:11














Results





- Labs





                                 10/04/22 05:17





                                 10/04/22 05:17


                  Abnormal Lab Results - Last 24 Hours (Table)











  10/04/22 10/04/22 Range/Units





  05:17 05:17 


 


RBC  2.09 L   (4.40-5.60)  X 10*6/uL


 


Hgb  7.8 L   (13.0-17.0)  g/dL


 


Hct  24.9 L   (39.6-50.0)  %


 


MCV  119.1 H   (80.0-97.0)  fL


 


MCH  37.3 H   (27.0-32.0)  pg


 


MCHC  31.3 L   (32.0-37.0)  g/dL


 


RDW  18.6 H   (11.5-14.5)  %


 


Plt Count  83 L   (140-440)  X 10*3/uL


 


Plt Count Comment  DECREASED A   


 


Absolute Nucleated RBC  0.05 H   (0.00-0.00)  X 10*3/uL


 


Monocytes #  1.80 H   (0.20-1.00)  X 10*3/uL


 


NRBC/100 WBC Diff  0.7 H   (0.0-0.0)  /100 WBCS


 


Sodium   133 L  (135-145)  mmol/L


 


Potassium   3.3 L  (3.5-5.5)  mmol/L


 


Calcium   8.2 L  (8.7-10.3)  mg/dL








                                 Diabetes panel











  10/04/22 Range/Units





  05:17 


 


Sodium  133 L  (135-145)  mmol/L


 


Potassium  3.3 L  (3.5-5.5)  mmol/L


 


Chloride  98  ()  mmol/L


 


Carbon Dioxide  22.3  (20.0-27.5)  mmol/L


 


BUN  14.0  (9.0-27.0)  mg/dL


 


Creatinine  0.7  (0.6-1.5)  mg/dL


 


Glucose  98  ()  mg/dL


 


Calcium  8.2 L  (8.7-10.3)  mg/dL








                                  Calcium panel











  10/04/22 Range/Units





  05:17 


 


Calcium  8.2 L  (8.7-10.3)  mg/dL








                                 Pituitary panel











  10/04/22 Range/Units





  05:17 


 


Sodium  133 L  (135-145)  mmol/L


 


Potassium  3.3 L  (3.5-5.5)  mmol/L


 


Chloride  98  ()  mmol/L


 


Carbon Dioxide  22.3  (20.0-27.5)  mmol/L


 


BUN  14.0  (9.0-27.0)  mg/dL


 


Creatinine  0.7  (0.6-1.5)  mg/dL


 


Glucose  98  ()  mg/dL


 


Calcium  8.2 L  (8.7-10.3)  mg/dL








                                  Adrenal panel











  10/04/22 Range/Units





  05:17 


 


Sodium  133 L  (135-145)  mmol/L


 


Potassium  3.3 L  (3.5-5.5)  mmol/L


 


Chloride  98  ()  mmol/L


 


Carbon Dioxide  22.3  (20.0-27.5)  mmol/L


 


BUN  14.0  (9.0-27.0)  mg/dL


 


Creatinine  0.7  (0.6-1.5)  mg/dL


 


Glucose  98  ()  mg/dL


 


Calcium  8.2 L  (8.7-10.3)  mg/dL

## 2022-10-05 LAB
ANION GAP SERPL CALC-SCNC: 8 MMOL/L
BUN SERPL-SCNC: 14 MG/DL (ref 9–20)
CALCIUM SPEC-MCNC: 8.1 MG/DL (ref 8.4–10.2)
CHLORIDE SERPL-SCNC: 99 MMOL/L (ref 98–107)
CO2 SERPL-SCNC: 25 MMOL/L (ref 22–30)
ERYTHROCYTE [DISTWIDTH] IN BLOOD BY AUTOMATED COUNT: 2.05 M/UL (ref 4.3–5.9)
ERYTHROCYTE [DISTWIDTH] IN BLOOD: 18.2 % (ref 11.5–15.5)
GLUCOSE SERPL-MCNC: 132 MG/DL (ref 74–99)
HCT VFR BLD AUTO: 23.4 % (ref 39–53)
HGB BLD-MCNC: 7.5 GM/DL (ref 13–17.5)
MCH RBC QN AUTO: 36.6 PG (ref 25–35)
MCHC RBC AUTO-ENTMCNC: 32 G/DL (ref 31–37)
MCV RBC AUTO: 114.4 FL (ref 80–100)
PLATELET # BLD AUTO: 92 K/UL (ref 150–450)
POTASSIUM SERPL-SCNC: 4.1 MMOL/L (ref 3.5–5.1)
SODIUM SERPL-SCNC: 132 MMOL/L (ref 137–145)
WBC # BLD AUTO: 7 K/UL (ref 3.8–10.6)

## 2022-10-05 RX ADMIN — SUCRALFATE SCH GM: 1 TABLET ORAL at 12:21

## 2022-10-05 RX ADMIN — MORPHINE SULFATE PRN MG: 30 TABLET, FILM COATED, EXTENDED RELEASE ORAL at 20:36

## 2022-10-05 RX ADMIN — SUCRALFATE SCH GM: 1 TABLET ORAL at 07:30

## 2022-10-05 RX ADMIN — ATORVASTATIN CALCIUM SCH MG: 20 TABLET, FILM COATED ORAL at 07:32

## 2022-10-05 RX ADMIN — HYDROMORPHONE HYDROCHLORIDE PRN MG: 1 INJECTION, SOLUTION INTRAMUSCULAR; INTRAVENOUS; SUBCUTANEOUS at 20:41

## 2022-10-05 RX ADMIN — METOPROLOL TARTRATE SCH MG: 25 TABLET, FILM COATED ORAL at 12:21

## 2022-10-05 RX ADMIN — MORPHINE SULFATE PRN MG: 30 TABLET, FILM COATED, EXTENDED RELEASE ORAL at 01:32

## 2022-10-05 RX ADMIN — HYDROMORPHONE HYDROCHLORIDE PRN MG: 1 INJECTION, SOLUTION INTRAMUSCULAR; INTRAVENOUS; SUBCUTANEOUS at 02:48

## 2022-10-05 RX ADMIN — ENOXAPARIN SODIUM SCH MG: 30 INJECTION SUBCUTANEOUS at 07:31

## 2022-10-05 RX ADMIN — BUDESONIDE SCH MG: 0.25 SUSPENSION RESPIRATORY (INHALATION) at 21:27

## 2022-10-05 RX ADMIN — IPRATROPIUM BROMIDE AND ALBUTEROL SULFATE PRN ML: .5; 3 SOLUTION RESPIRATORY (INHALATION) at 12:02

## 2022-10-05 RX ADMIN — HYDROMORPHONE HYDROCHLORIDE PRN MG: 1 INJECTION, SOLUTION INTRAMUSCULAR; INTRAVENOUS; SUBCUTANEOUS at 16:52

## 2022-10-05 RX ADMIN — SUCRALFATE SCH GM: 1 TABLET ORAL at 20:36

## 2022-10-05 RX ADMIN — FAMOTIDINE SCH: 20 TABLET, FILM COATED ORAL at 07:31

## 2022-10-05 RX ADMIN — METOPROLOL TARTRATE SCH MG: 25 TABLET, FILM COATED ORAL at 20:36

## 2022-10-05 RX ADMIN — PANTOPRAZOLE SODIUM SCH MG: 40 TABLET, DELAYED RELEASE ORAL at 20:36

## 2022-10-05 RX ADMIN — GABAPENTIN SCH MG: 100 CAPSULE ORAL at 07:32

## 2022-10-05 RX ADMIN — IPRATROPIUM BROMIDE AND ALBUTEROL SULFATE PRN ML: .5; 3 SOLUTION RESPIRATORY (INHALATION) at 21:27

## 2022-10-05 RX ADMIN — IPRATROPIUM BROMIDE AND ALBUTEROL SULFATE PRN ML: .5; 3 SOLUTION RESPIRATORY (INHALATION) at 16:23

## 2022-10-05 RX ADMIN — GABAPENTIN SCH MG: 100 CAPSULE ORAL at 20:36

## 2022-10-05 RX ADMIN — HYDROCORTISONE SCH MG: 10 TABLET ORAL at 14:52

## 2022-10-05 RX ADMIN — SUCRALFATE SCH GM: 1 TABLET ORAL at 16:44

## 2022-10-05 RX ADMIN — PANTOPRAZOLE SODIUM SCH MG: 40 TABLET, DELAYED RELEASE ORAL at 07:32

## 2022-10-05 RX ADMIN — HYDROCORTISONE SCH MG: 20 TABLET ORAL at 07:31

## 2022-10-05 RX ADMIN — BUDESONIDE SCH: 0.25 SUSPENSION RESPIRATORY (INHALATION) at 08:57

## 2022-10-05 NOTE — CA
Transthoracic Echo Report 

 Name: Bhaskar Erickson 

 MRN:    [id number] 

 Age:    62     Gender:     M 

 

 :    1960 

 Exam Date:     10/05/2022 10:39 

 Exam Location: [facility] 

 Ht (in):     64     Wt (lb):     171 

 Ordering Physician:        Jessica Joyce 

 Attending/Referring Phys:         Yash Rios DO 

 Technician         Justina Levine, JACE 

 Procedure CPT: 

 Indications:       SOB, pericardiac effusion. Hx metastatic lung CA 

 

 Cardiac Hx: 

 Technical Quality:      Fair 

 Contrast 1:                                Total Dose (mL): 

 Contrast 2:                                Total Dose (mL): 

 

 MEASUREMENTS  (Male / Female) Normal Values 

 2D ECHO 

 LV Diastolic Diameter PLAX        4.2 cm                4.2 - 5.9 / 3.9 - 5.3 cm 

 LV Systolic Diameter PLAX         3.4 cm                 

 IVS Diastolic Thickness           1.1 cm                0.6 - 1.0 / 0.6 - 0.9 cm 

 LVPW Diastolic Thickness          1.5 cm                0.6 - 1.0 / 0.6 - 0.9 cm 

 LV Relative Wall Thickness        0.6                    

 

 M-MODE 

 MV E Point Septal Separation      0.4 cm                 

 

 

 FINDINGS 

 Left Ventricle 

 Left ventricular ejection fraction is estimated at 55-60 %. Mildly increased  

 left ventricular wall thickness. 

 

 Right Ventricle 

 Normal right ventricular size and function. 

 

 Right Atrium 

 Normal right atrial size. 

 

 Left Atrium 

 Normal left atrial size. 

 

 Mitral Valve 

 Mitral annular calcification. Mild mitral regurgitation. 

 

 Aortic Valve 

 Trileaflet aortic valve. 

 

 Tricuspid Valve 

 Structurally normal tricuspid valve. Mild tricuspid regurgitation. 

 

 Pulmonic Valve 

 Pulmonic valve not well visualized. 

 

 Pericardium 

 Minimal pericardial effusion (normal variant). 

 

 Aorta 

 Normal size aortic root and proximal ascending aorta. 

 

 CONCLUSIONS 

 1.  Normal left ventricle size and systolic function 

 2.  Mild mitral and tricuspid regurgitation 

 Previewed by:  

 Dr. Sophy Ingram MD 

 (Electronically Signed) 

 Final Date:      2022 12:56

## 2022-10-05 NOTE — P.PN
Progress Note - Text


Progress Note Date: 10/05/22


Patient not seen, off the floor receiving second radiation treatment.











The impression and plan of care has been dictated as directed.





:


I performed a history and examination of this patient,  discussed the same with 

the dictator.  I agree with the dictator's note ,documented as a scribe.  Any 

additional findings or plans will be noted.

## 2022-10-05 NOTE — P.PN
Subjective


Progress Note Date: 10/05/22





CHIEF COMPLAINT: Abdominal pain





HISTORY OF PRESENT ILLNESS: This is a 62-year-old male with a known history of 

metastatic lung cancer.  Patient currently undergoing radiation treatment.  

Surgical service consulted for evaluation of patient's abdominal pain with 

endoscopies.  Patient started GoLYTELY prep today.  He has been tachycardic.  

Heart rate is up to 132.  He does have some underlying anxiety issues.  Medicine

service is aware and his consulted cardiology.  Afebrile.  WBC is 7 hemoglobin 

7.8 down to 7.5.  Repeat potassium is up from 3.3-4.1





Patient seen and examined with Dr. avendano





PHYSICAL EXAM: 


VITAL SIGNS: Reviewed.


GENERAL: Well-developed in no acute distress. 


HEENT:  No sclera icterus. Extraocular movements grossly intact.  Moist buccal 

mucosa. Head is atraumatic, normocephalic. 


ABDOMEN:  Soft.  Nondistended. 


NEUROLOGIC: Alert and oriented. Cranial nerves II through XII grossly intact.





ASSESSMENT: 


1.  Abdominal pain


2.  Lung cancer with metastatic disease


3.  Hypokalemia


4.  Tachycardia being evaluated by cardiology





PLAN: 


-EGD and colonoscopy scheduled for tomorrow, 10/06/2022 with Dr. avendano


-Clear liquid diet today


-Continue GoLYTELY prep


-Nothing by mouth after midnight





Physician Assistant note has been reviewed by physician. Signing provider agrees

with the documented findings, assessment, and plan of care. 





Objective





- Vital Signs


Vital signs: 


                                   Vital Signs











Temp  98.0 F   10/05/22 07:59


 


Pulse  84   10/05/22 07:59


 


Resp  22   10/05/22 07:59


 


BP  113/63   10/05/22 07:59


 


Pulse Ox  97   10/05/22 07:59


 


FiO2  21   10/02/22 20:24








                                 Intake & Output











 10/04/22 10/05/22 10/05/22





 18:59 06:59 18:59


 


Intake Total 600  


 


Balance 600  


 


Intake:   


 


  Oral 600  


 


Other:   


 


  # Voids 3 1 














- Labs


CBC & Chem 7: 


                                 10/05/22 06:26





                                 10/05/22 06:26


Labs: 


                  Abnormal Lab Results - Last 24 Hours (Table)











  10/05/22 10/05/22 Range/Units





  06:26 06:26 


 


RBC  2.05 L   (4.30-5.90)  m/uL


 


Hgb  7.5 L   (13.0-17.5)  gm/dL


 


Hct  23.4 L   (39.0-53.0)  %


 


MCV  114.4 H   (80.0-100.0)  fL


 


MCH  36.6 H   (25.0-35.0)  pg


 


RDW  18.2 H   (11.5-15.5)  %


 


Plt Count  92 L   (150-450)  k/uL


 


Macrocytosis  Marked A   


 


Sodium   132 L  (137-145)  mmol/L


 


Creatinine   0.65 L  (0.66-1.25)  mg/dL


 


Glucose   132 H  (74-99)  mg/dL


 


Calcium   8.1 L  (8.4-10.2)  mg/dL

## 2022-10-05 NOTE — P.CRDCN
History of Present Illness


History of present illness: 


HISTORY OF PRESENTING ILLNESS


This is a pleasant 62-year-old male with a past medical history of stage IV 

metastatic small cell lung cancer s/p radiation and chemotherapy, adrenal 

insufficiency COPD, hyperlipidemia, former tobacco use. He does not follow with 

a cardiologist. Cardiology consulted for anemia, tachycardia and shortness of 

breath. Patient presented to the ER on 10/1/2022 with complaints of worsening 

back pain, shortness of breath, and abdominal pain. He states he has been having

these symptoms for quite some time, however they have worsened. Patient is 

currently being followed up Oncology and Surgery. He was found to have worsening

anemia, and CT scans with increased size of metastatic disease. Patient denies 

any chest pain, lightheadedness, dizziness, syncope or near syncope or 

palpitations. He denies any blood in his stool or urine, denies any constipation

or diarrhea. Denies an worsening LE edema, symptoms of orthopnea or PND. 

Telemetry reviewed patient has been maintaining sinus mechanism -120. 

Patient denies any history of CAD, MI, stroke, seizures, or hypertension. He is 

a former smoker, quit a little over 1 year ago. 





DIAGNOSTICS


* EKG reveals sinus tachycardia, heart rate 117, no significant STT wave 

  abnormalities.


* Telemetry tracings indicate sinus tachycardia 100-120s, no arrhythmia noted. 


* CTA- reported no evidence of pulmonary embolism, large mass at the left 

  pulmonary hilum with encasement of the left lower lobe pulmonary artery which 

  has progressed compared to old exam.  Left upper lobe masslike infiltrate 

  slightly decreased in size compared to old exam, massive mediastinal 

  adenopathy without change, there is right middle lobe nodule which is new 

  compared to exam, could be metastatic disease.left adrenal mass  increased 

  compared to old exam, consistent with metastatic disease


* CT abdomen and pelvis reported pericardial effusion is slightly increased 

  compared to last exam.  No masses of the left cardiac border are the same or 

  increased, adrenal mass slightly increased, retrocrural metastatic nodules in 

  the left paraspinal region slightly increased


* Laboratory reviewed, WBC 7.0, hemoglobin 7.5, platelets 92, sodium 132, 

  potassium 4.1, BUN 14, 70 g 0.6


* Current home cardiac medications include atorvastatin 20 mg daily





REVIEW OF SYSTEMS


CONSTITUTIONAL: Denies fever or chills.


CARDIOVASCULAR: Denies chest pain, +shortness of breath, Denies orthopnea, PND 

or palpitations.


RESPIRATORY: Denies cough. 


GASTROINTESTINAL: + abdominal pain, Denies diarrhea, constipation, nausea or 

vomiting.


MUSCULOSKELETAL: Reports back pain 


NEUROLOGIC: Denies numbness, tingling, headache or weakness.


ENDOCRINE: Denies fatigue, weight change,  polydipsia or polyurina.


GENITOURINARY: Denies burning, hematuria or urgency with micturation.


HEMATOLOGIC: Denies bleeding. 





PHYSICAL EXAMINATION


Blood pressure 113/63, heart rate 84, afebrile, oxygen saturations 97% on room 

air


CONSTITUTIONAL: No apparent distress. 


HEENT: Head is normocephalic. Pupils are equal, round. Sclerae anicteric. Mucous

membranes of the mouth are moist.  No JVD. No carotid bruit.


CHEST EXAMINATION: Lungs diminished with expiratory wheezing bilateral upper lo

bes to auscultation. No chest wall tenderness is noted on palpation or with deep

breathing. 


HEART EXAMINATION: Regular rate and rhythm. S1, S2 heard. No murmurs, gallops or

rub.


ABDOMEN: Soft, tenderness to palpation. Positive bowel sounds.


EXTREMITIES: 2+ peripheral pulses, mild non-pitting bilateral ankle edema and no

calf tenderness.


NEUROLOGIC EXAMINATION: Patient is awake, alert and oriented x3. 





ASSESSMENT


Sinus tachycardia 


Stage IV metastatic small cell lung cancer


CTA reported increased metastasis in lungs 


CT reported pericardial effusion slightly increased from old exam 


Anemia


Thrombocytopenia


History of COPD


GERD


History of tobacco use 


Adrenal insufficiency 


Hyperlipidemia 





PLAN


Patient in sinus tachycardia, related to current metastatic lung cancer, pain, 

anemia. 


Echocardiogram revealed EF 55-60%, mild mitral regurgitation, mild tricuspid 

regurgitation.


Do not recommend starting any cardiac medications at this time.


No further changes from cardiology perspective. 


We will follow the patient as needed. Please reach out with any further 

questions or concerns. 





Nurse practitioner note has been reviewed by physician. Signing provider agrees 

with the documented findings, assessment, and plan of care. 














Past Medical History


Past Medical History: Cancer, COPD, GERD/Reflux


Additional Past Medical History / Comment(s): LUNG CANCER WITH TUMOR IN STOMACH 

AND STATES ALSO IN HIS NECK.,.  RECEIVING CHEMOTHERAPY., DDD WITH BACK ,HIP & 

LEG PAIN., (PAIN CLINIC PATIENT)., EMPHYSEMA., GASTRITIS.


History of Any Multi-Drug Resistant Organisms: None Reported


Past Surgical History: Joint Replacement, Orthopedic Surgery


Additional Past Surgical History / Comment(s): RIGHT AND LEFT TOTAL HIPS,  RIGHT

SHOULDER SURGERY.


Past Anesthesia/Blood Transfusion Reactions: No Reported Reaction


Past Psychological History: Anxiety, Depression


Smoking Status: Former smoker


Past Alcohol Use History: None Reported


Past Drug Use History: None Reported





- Past Family History


  ** Father


Family Medical History: No Reported History





Medications and Allergies


                                Home Medications











 Medication  Instructions  Recorded  Confirmed  Type


 


Atorvastatin [Lipitor] 20 mg PO DAILY 06/25/21 10/01/22 History


 


OXcarbazepine [Trileptal] 300 mg PO BID 06/25/21 10/01/22 History


 


Pantoprazole Sodium [Protonix] 40 mg PO BID 06/25/21 10/01/22 History


 


Sucralfate [Carafate] 1 gm PO ACHS 06/25/21 10/01/22 History


 


traZODone HCL [Desyrel] 100 mg PO HS 06/25/21 10/01/22 History


 


ALPRAZolam [Xanax] 0.5 mg PO BID PRN 04/29/22 10/01/22 History


 


Budesonide [Pulmicort] 0.25 mg INHALATION RT-BID 04/29/22 10/01/22 History


 


Hydrocortisone [Cortef] 10 mg PO DAILY@1400 04/29/22 10/01/22 History


 


Ipratropium-Albuterol Nebulize 3 ml INHALATION RT-QID PRN 04/29/22 10/01/22 

History





[Duoneb 0.5 mg-3 mg/3 ml Soln]    


 


metFORMIN  mg PO BID 04/29/22 10/01/22 History


 


Albuterol Nebulized [Ventolin 2.5 mg INHALATION RT-QID PRN 06/09/22 10/01/22 

History





Nebulized]    


 


Fluconazole [Diflucan] 100 mg PO DAILY PRN 06/09/22 10/01/22 History


 


Insulin Aspart [NovoLOG Flexpen] See Protocol SQ ACHS 06/10/22 10/01/22 History


 


Dicyclomine [Bentyl] 10 mg PO TID PRN 10 Days #30 07/27/22 10/01/22 Rx





 capsule   


 


Famotidine 40 mg PO DAILY 07/27/22 10/01/22 History


 


Metoclopramide [Reglan] 5 mg PO AC-BID PRN 07/27/22 10/01/22 History


 


Semaglutide [Rybelsus] 3 mg PO DAILY 07/27/22 10/01/22 History


 


Gabapentin [Neurontin] 100 mg PO BID 30 Days #60 cap 09/29/22 10/01/22 Rx


 


Hydrocortisone [Cortef] 20 mg PO DAILY@0800 10/01/22 10/01/22 History


 


Morphine Sulfate ER [Ms Contin] 30 mg PO Q12HR PRN 10/01/22 10/01/22 History


 


fentaNYL 25MCG/HR PATCH [Duragesic 1 patch TRANSDERM Q72H 10/01/22 10/01/22 

History





25MCG/HR]    


 


polyethylene glycoL 3350 [Miralax] 17 gm PO DAILY PRN 10/01/22 10/01/22 History








                                    Allergies











Allergy/AdvReac Type Severity Reaction Status Date / Time


 


No Known Allergies Allergy   Verified 10/01/22 21:46














Physical Exam


Vitals: 


                                   Vital Signs











  Temp Pulse Pulse Resp BP Pulse Ox


 


 10/05/22 07:59  98.0 F   84  22  113/63  97


 


 10/05/22 02:52  98.4 F   132 H  21  134/69  91 L


 


 10/04/22 20:44   100    


 


 10/04/22 20:34   104 H    


 


 10/04/22 20:29  98.1 F   109 H  19  106/69  95


 


 10/04/22 16:08   102 H    


 


 10/04/22 15:58   102 H    


 


 10/04/22 14:00  98.5 F   76  16  144/80  92 L


 


 10/04/22 12:22   114 H    


 


 10/04/22 12:12   115 H    








                                Intake and Output











 10/04/22 10/05/22 10/05/22





 22:59 06:59 14:59


 


Intake Total 360  


 


Balance 360  


 


Intake:   


 


  Oral 360  


 


Other:   


 


  # Voids 3 1 














Results





                                 10/05/22 06:26





                                 10/05/22 06:26


                                       CBC











  10/04/22 10/05/22 Range/Units





  05:17 06:26 


 


WBC  7.25  7.0  (4.50-10.00)  X 10*3/uL


 


RBC  2.09 L  2.05 L  (4.40-5.60)  X 10*6/uL


 


Hgb  7.8 L  7.5 L  (13.0-17.0)  g/dL


 


Hct  24.9 L  23.4 L  (39.6-50.0)  %


 


Plt Count  83 L  92 L  (140-440)  X 10*3/uL








                          Comprehensive Metabolic Panel











  10/05/22 Range/Units





  06:26 


 


Sodium  132 L  (137-145)  mmol/L


 


Potassium  4.1  (3.5-5.1)  mmol/L


 


Chloride  99  ()  mmol/L


 


Carbon Dioxide  25  (22-30)  mmol/L


 


BUN  14  (9-20)  mg/dL


 


Creatinine  0.65 L  (0.66-1.25)  mg/dL


 


Glucose  132 H  (74-99)  mg/dL


 


Calcium  8.1 L  (8.4-10.2)  mg/dL








                               Current Medications











Generic Name Dose Route Start Last Admin





  Trade Name Freq  PRN Reason Stop Dose Admin


 


Albuterol/Ipratropium  3 ml  10/02/22 10:49  10/04/22 20:33





  Ipratropium-Albuterol 3 Ml Neb  INHALATION   3 ml





  RT-QID PRN   Administration





  Shortness Of Breath  


 


Alprazolam  0.5 mg  10/02/22 10:49  10/04/22 23:06





  Alprazolam 0.5 Mg Tab  PO   0.5 mg





  BID PRN   Administration





  Anxiety  


 


Atorvastatin Calcium  20 mg  10/03/22 09:00  10/05/22 07:32





  Atorvastatin 20 Mg Tab  PO   20 mg





  DAILY PETER   Administration


 


Budesonide  0.25 mg  10/02/22 20:00  10/05/22 08:57





  Budesonide 0.25 Mg/2 Ml Nebu  INHALATION   Not Given





  RT-BID [facility]  


 


Dicyclomine HCl  10 mg  10/02/22 10:49 





  Dicyclomine 10 Mg Cap  PO  





  TID PRN  





  STOMACH PAIN  


 


Enoxaparin Sodium  30 mg  10/03/22 09:00  10/05/22 07:31





  Enoxaparin 30 Mg/0.3 Ml Syringe  SQ   30 mg





  DAILY PETER   Administration


 


Famotidine  40 mg  10/02/22 11:00  10/05/22 07:31





  Famotidine 20 Mg Tab  PO   Not Given





  DAILY PETER  


 


Fentanyl  1 patch  10/05/22 12:00 





  Fentanyl 25mcg/Hr Patch  TRANSDERM  





  Q48H [facility]  





  Protocol  


 


Gabapentin  100 mg  10/02/22 21:00  10/05/22 07:32





  Gabapentin 100 Mg Cap  PO   100 mg





  BID PETER   Administration


 


Hydrocortisone  10 mg  10/02/22 14:00  10/04/22 13:35





  Hydrocortisone 10 Mg Tab  PO   10 mg





  DAILY@1400 PETER   Administration


 


Hydrocortisone  20 mg  10/03/22 08:00  10/05/22 07:31





  Hydrocortisone 20 Mg Tab  PO   20 mg





  DAILY@0800 PETER   Administration


 


Hydromorphone HCl  1 mg  10/01/22 19:11  10/05/22 02:48





  Hydromorphone 1 Mg/Ml 1 Ml Syringe  IVP   1 mg





  Q3HR PRN   Administration





  Severe Pain (Scale 7 to 10)  


 


Metoclopramide HCl  5 mg  10/03/22 10:37 





  Metoclopramide 5 Mg Tab  PO  





  AC-BID PRN  





  Nausea  


 


Miscellaneous Information  1 each  10/03/22 17:42 





  Potassium Replacement Protocol 1 Each Misc  MISCELLANE  





  DAILY PRN  





  Per Protocol  





  Protocol  


 


Morphine Sulfate  30 mg  10/02/22 10:49  10/05/22 01:32





  Morphine Sulfate Er 30 Mg Tablet  PO   30 mg





  Q12HR PRN   Administration





  Pain  





  Protocol  


 


Naloxone HCl  0.2 mg  10/01/22 19:11 





  Naloxone 0.4 Mg/Ml 1 Ml Vial  IV  





  Q2M PRN  





  Opioid Reversal  


 


Oxcarbazepine  300 mg  10/02/22 21:00  10/05/22 07:32





  Oxcarbazepine 300 Mg Tab  PO   300 mg





  BID PETER   Administration


 


Pantoprazole Sodium  40 mg  10/02/22 21:00  10/05/22 07:32





  Pantoprazole 40 Mg Tablet  PO   40 mg





  BID PETER   Administration


 


Polyethylene Glycol  17 gm  10/02/22 10:49 





  Polyethylene Glycol 3350 17 Gm Powd.Pack  PO  





  DAILY PRN  





  Constipation  


 


Sucralfate  1 gm  10/04/22 10:15  10/05/22 07:30





  Sucralfate 1 Gm Tab  PO   1 gm





  [facility] PETER   Administration


 


Trazodone HCl  100 mg  10/02/22 21:00  10/04/22 20:06





  Trazodone Hcl 100 Mg Tab  PO   100 mg





  HS PETER   Administration








                                Intake and Output











 10/04/22 10/05/22 10/05/22





 22:59 06:59 14:59


 


Intake Total 360  


 


Balance 360  


 


Intake:   


 


  Oral 360  


 


Other:   


 


  # Voids 3 1 








                                        





                                 10/05/22 06:26 





                                 10/05/22 06:26

## 2022-10-06 LAB
ANION GAP SERPL CALC-SCNC: 9 MMOL/L
BUN SERPL-SCNC: 7 MG/DL (ref 9–20)
CALCIUM SPEC-MCNC: 8.2 MG/DL (ref 8.4–10.2)
CHLORIDE SERPL-SCNC: 100 MMOL/L (ref 98–107)
CO2 SERPL-SCNC: 26 MMOL/L (ref 22–30)
ERYTHROCYTE [DISTWIDTH] IN BLOOD BY AUTOMATED COUNT: 2.21 M/UL (ref 4.3–5.9)
ERYTHROCYTE [DISTWIDTH] IN BLOOD: 17.6 % (ref 11.5–15.5)
GLUCOSE SERPL-MCNC: 99 MG/DL (ref 74–99)
HCT VFR BLD AUTO: 26.4 % (ref 39–53)
HGB BLD-MCNC: 8 GM/DL (ref 13–17.5)
MCH RBC QN AUTO: 36.2 PG (ref 25–35)
MCHC RBC AUTO-ENTMCNC: 30.3 G/DL (ref 31–37)
MCV RBC AUTO: 119.6 FL (ref 80–100)
PLATELET # BLD AUTO: 90 K/UL (ref 150–450)
POTASSIUM SERPL-SCNC: 3.2 MMOL/L (ref 3.5–5.1)
SODIUM SERPL-SCNC: 135 MMOL/L (ref 137–145)
WBC # BLD AUTO: 4.7 K/UL (ref 3.8–10.6)

## 2022-10-06 PROCEDURE — 0DB78ZX EXCISION OF STOMACH, PYLORUS, VIA NATURAL OR ARTIFICIAL OPENING ENDOSCOPIC, DIAGNOSTIC: ICD-10-PCS

## 2022-10-06 PROCEDURE — 0DBC8ZX EXCISION OF ILEOCECAL VALVE, VIA NATURAL OR ARTIFICIAL OPENING ENDOSCOPIC, DIAGNOSTIC: ICD-10-PCS

## 2022-10-06 RX ADMIN — HYDROMORPHONE HYDROCHLORIDE PRN MG: 1 INJECTION, SOLUTION INTRAMUSCULAR; INTRAVENOUS; SUBCUTANEOUS at 06:16

## 2022-10-06 RX ADMIN — SUCRALFATE SCH GM: 1 TABLET ORAL at 11:32

## 2022-10-06 RX ADMIN — HYDROMORPHONE HYDROCHLORIDE PRN MG: 1 INJECTION, SOLUTION INTRAMUSCULAR; INTRAVENOUS; SUBCUTANEOUS at 03:01

## 2022-10-06 RX ADMIN — HYDROCORTISONE SCH MG: 20 TABLET ORAL at 08:24

## 2022-10-06 RX ADMIN — SUCRALFATE SCH GM: 1 TABLET ORAL at 18:51

## 2022-10-06 RX ADMIN — HYDROMORPHONE HYDROCHLORIDE PRN MG: 1 INJECTION, SOLUTION INTRAMUSCULAR; INTRAVENOUS; SUBCUTANEOUS at 19:30

## 2022-10-06 RX ADMIN — MORPHINE SULFATE PRN MG: 30 TABLET, FILM COATED, EXTENDED RELEASE ORAL at 17:10

## 2022-10-06 RX ADMIN — SUCRALFATE SCH GM: 1 TABLET ORAL at 08:23

## 2022-10-06 RX ADMIN — BUDESONIDE SCH MG: 0.25 SUSPENSION RESPIRATORY (INHALATION) at 07:37

## 2022-10-06 RX ADMIN — HYDROMORPHONE HYDROCHLORIDE PRN MG: 1 INJECTION, SOLUTION INTRAMUSCULAR; INTRAVENOUS; SUBCUTANEOUS at 09:54

## 2022-10-06 RX ADMIN — GABAPENTIN SCH MG: 100 CAPSULE ORAL at 21:26

## 2022-10-06 RX ADMIN — GABAPENTIN SCH MG: 100 CAPSULE ORAL at 08:23

## 2022-10-06 RX ADMIN — BUDESONIDE SCH MG: 0.25 SUSPENSION RESPIRATORY (INHALATION) at 20:34

## 2022-10-06 RX ADMIN — SUCRALFATE SCH GM: 1 TABLET ORAL at 21:27

## 2022-10-06 RX ADMIN — HYDROMORPHONE HYDROCHLORIDE PRN MG: 1 INJECTION, SOLUTION INTRAMUSCULAR; INTRAVENOUS; SUBCUTANEOUS at 14:26

## 2022-10-06 RX ADMIN — IPRATROPIUM BROMIDE AND ALBUTEROL SULFATE PRN ML: .5; 3 SOLUTION RESPIRATORY (INHALATION) at 11:01

## 2022-10-06 RX ADMIN — FAMOTIDINE SCH MG: 20 TABLET, FILM COATED ORAL at 08:23

## 2022-10-06 RX ADMIN — METOPROLOL TARTRATE SCH MG: 25 TABLET, FILM COATED ORAL at 08:23

## 2022-10-06 RX ADMIN — ENOXAPARIN SODIUM SCH MG: 30 INJECTION SUBCUTANEOUS at 08:23

## 2022-10-06 RX ADMIN — PANTOPRAZOLE SODIUM SCH MG: 40 TABLET, DELAYED RELEASE ORAL at 21:27

## 2022-10-06 RX ADMIN — ATORVASTATIN CALCIUM SCH MG: 20 TABLET, FILM COATED ORAL at 08:23

## 2022-10-06 RX ADMIN — PANTOPRAZOLE SODIUM SCH MG: 40 TABLET, DELAYED RELEASE ORAL at 08:22

## 2022-10-06 RX ADMIN — POTASSIUM CHLORIDE SCH: 20 TABLET, EXTENDED RELEASE ORAL at 08:00

## 2022-10-06 RX ADMIN — IPRATROPIUM BROMIDE AND ALBUTEROL SULFATE PRN ML: .5; 3 SOLUTION RESPIRATORY (INHALATION) at 07:37

## 2022-10-06 RX ADMIN — HYDROCORTISONE SCH MG: 10 TABLET ORAL at 13:51

## 2022-10-06 RX ADMIN — HYDROMORPHONE HYDROCHLORIDE PRN MG: 1 INJECTION, SOLUTION INTRAMUSCULAR; INTRAVENOUS; SUBCUTANEOUS at 23:53

## 2022-10-06 RX ADMIN — METOPROLOL TARTRATE SCH MG: 25 TABLET, FILM COATED ORAL at 21:27

## 2022-10-06 RX ADMIN — IPRATROPIUM BROMIDE AND ALBUTEROL SULFATE PRN ML: .5; 3 SOLUTION RESPIRATORY (INHALATION) at 20:34

## 2022-10-06 RX ADMIN — POTASSIUM CHLORIDE SCH: 20 TABLET, EXTENDED RELEASE ORAL at 08:33

## 2022-10-06 NOTE — P.PN
Progress Note - Text


Progress Note Date: 10/06/22


Attempted to see patient twice today, once on MedSurg unit, another in recovery 

room.  Patient currently in endoscopic procedure room-patient not seen.








The impression and plan of care has been dictated as directed.





:


I performed a history and examination of this patient,  discussed the same with 

the dictator.  I agree with the dictator's note ,documented as a scribe.  Any 

additional findings or plans will be noted.

## 2022-10-06 NOTE — P.OP
Date of Procedure: 10/06/22


Preoperative Diagnosis: 


Abdominal pain





Metastatic colon cancer


Postoperative Diagnosis: 


Gastritis





Right colon lipoma


Procedure(s) Performed: 


EGD





Colonoscopy


Anesthesia: MAC


Surgeon: Mina Mcbride


Pathology: other (Stomach, right colon)


Condition: stable


Disposition: PACU


Description of Procedure: 


The patient's placed on the endoscopy table in the lateral position.  He 

received IV sedation.  The gastroscope patient oropharynx passed in the 

esophagus stomach.  Scope was placed through the pylorus.  The first and second 

portion of the duodenum appeared normal.  Scope was then brought back the antrum

and there was evidence of some gastritis.  Scope was then withdrawn and in the 

mid portion stomach there was increased gastritis.  This area is biopsied.  The 

remainder the stomach appeared normal.  The GE junction was at 47 is.  The 

distal esophagus appeared normal.  The proximal esophagus appeared normal.  

Scope withdrawn for patient.





Next digital rectal exam was performed which revealed a few external 

hemorrhoids.  The flexible colonoscope was then placed in patient's anus and 

passed throughout the entire colon.  The ileocecal valve was visually is.  At 

this level cecum there was a small lipoma at the ileocecal valve.  This area is 

biopsied.  The ascending colon appeared normal.  The transverse colon appeared 

normal.  The descending and sigmoid colon had a few scattered diverticuli.  The 

scope was brought back the rectum this appeared normal.  Scope withdrawn for 

patient.

## 2022-10-07 RX ADMIN — METOPROLOL TARTRATE SCH MG: 25 TABLET, FILM COATED ORAL at 20:32

## 2022-10-07 RX ADMIN — BUDESONIDE SCH MG: 0.25 SUSPENSION RESPIRATORY (INHALATION) at 19:31

## 2022-10-07 RX ADMIN — SUCRALFATE SCH GM: 1 TABLET ORAL at 07:15

## 2022-10-07 RX ADMIN — BUDESONIDE SCH MG: 0.25 SUSPENSION RESPIRATORY (INHALATION) at 07:21

## 2022-10-07 RX ADMIN — GABAPENTIN SCH MG: 100 CAPSULE ORAL at 07:16

## 2022-10-07 RX ADMIN — PANTOPRAZOLE SODIUM SCH MG: 40 TABLET, DELAYED RELEASE ORAL at 07:16

## 2022-10-07 RX ADMIN — HYDROMORPHONE HYDROCHLORIDE PRN MG: 1 INJECTION, SOLUTION INTRAMUSCULAR; INTRAVENOUS; SUBCUTANEOUS at 07:12

## 2022-10-07 RX ADMIN — METOPROLOL TARTRATE SCH MG: 25 TABLET, FILM COATED ORAL at 07:16

## 2022-10-07 RX ADMIN — ENOXAPARIN SODIUM SCH MG: 30 INJECTION SUBCUTANEOUS at 07:16

## 2022-10-07 RX ADMIN — PANTOPRAZOLE SODIUM SCH MG: 40 TABLET, DELAYED RELEASE ORAL at 20:32

## 2022-10-07 RX ADMIN — IPRATROPIUM BROMIDE AND ALBUTEROL SULFATE PRN ML: .5; 3 SOLUTION RESPIRATORY (INHALATION) at 07:21

## 2022-10-07 RX ADMIN — IPRATROPIUM BROMIDE AND ALBUTEROL SULFATE PRN ML: .5; 3 SOLUTION RESPIRATORY (INHALATION) at 19:32

## 2022-10-07 RX ADMIN — SUCRALFATE SCH GM: 1 TABLET ORAL at 15:19

## 2022-10-07 RX ADMIN — HYDROMORPHONE HYDROCHLORIDE PRN MG: 1 INJECTION, SOLUTION INTRAMUSCULAR; INTRAVENOUS; SUBCUTANEOUS at 18:00

## 2022-10-07 RX ADMIN — SUCRALFATE SCH GM: 1 TABLET ORAL at 20:32

## 2022-10-07 RX ADMIN — HYDROMORPHONE HYDROCHLORIDE PRN MG: 1 INJECTION, SOLUTION INTRAMUSCULAR; INTRAVENOUS; SUBCUTANEOUS at 21:51

## 2022-10-07 RX ADMIN — FAMOTIDINE SCH MG: 20 TABLET, FILM COATED ORAL at 07:15

## 2022-10-07 RX ADMIN — ATORVASTATIN CALCIUM SCH MG: 20 TABLET, FILM COATED ORAL at 07:16

## 2022-10-07 RX ADMIN — MORPHINE SULFATE PRN MG: 30 TABLET, FILM COATED, EXTENDED RELEASE ORAL at 22:42

## 2022-10-07 RX ADMIN — SUCRALFATE SCH GM: 1 TABLET ORAL at 11:31

## 2022-10-07 RX ADMIN — GABAPENTIN SCH MG: 100 CAPSULE ORAL at 20:33

## 2022-10-07 RX ADMIN — HYDROCORTISONE SCH MG: 20 TABLET ORAL at 07:16

## 2022-10-07 RX ADMIN — HYDROCORTISONE SCH MG: 10 TABLET ORAL at 15:19

## 2022-10-07 RX ADMIN — MORPHINE SULFATE PRN MG: 30 TABLET, FILM COATED, EXTENDED RELEASE ORAL at 10:22

## 2022-10-07 RX ADMIN — HYDROMORPHONE HYDROCHLORIDE PRN MG: 1 INJECTION, SOLUTION INTRAMUSCULAR; INTRAVENOUS; SUBCUTANEOUS at 10:22

## 2022-10-07 RX ADMIN — IPRATROPIUM BROMIDE AND ALBUTEROL SULFATE PRN ML: .5; 3 SOLUTION RESPIRATORY (INHALATION) at 11:20

## 2022-10-07 NOTE — P.PN
Subjective


Progress Note Date: 10/07/22





patient is status post EGD and colonoscopy.  He states that his abdominal pain 

is controlled with IV Dilaudid. begin states that his baseline regimen is not 

effective.  No fever/chills/nausea/vomiting





Objective





- Vital Signs


Vital signs: 


                                   Vital Signs











Temp  97.7 F   10/07/22 14:00


 


Pulse  98   10/07/22 14:00


 


Resp  20   10/07/22 14:00


 


BP  122/57   10/07/22 14:00


 


Pulse Ox  92 L  10/07/22 14:00


 


FiO2  21   10/02/22 20:24








                                 Intake & Output











 10/06/22 10/07/22 10/07/22





 18:59 06:59 18:59


 


Intake Total 100  


 


Balance 100  


 


Intake:   


 


    


 


Other:   


 


  Voiding Method Toilet Toilet Toilet














- Constitutional


General appearance: Present: no acute distress





- EENT


Eyes: Present: EOMI


ENT: Present: hearing grossly normal, normal oropharynx





- Respiratory


Respiratory: bilateral: diminished





- Cardiovascular


Rhythm: regular


Heart sounds: normal: S1, S2





- Gastrointestinal


General gastrointestinal: Present: normal bowel sounds, soft





- Integumentary


Integumentary: Present: normal





- Neurologic


Neurologic: Present: CNII-XII intact





- Musculoskeletal


Musculoskeletal: Present: generalized weakness, strength equal bilaterally





- Psychiatric


Psychiatric: Present: A&O x's 3, appropriate affect





- Labs


CBC & Chem 7: 


                                 10/06/22 05:34





                                 10/06/22 05:34





Assessment and Plan


(1) Abdominal pain


Narrative/Plan: 


 this remains persistent, with control of symptoms with the Dilaudid.  The 

patient again states that his baseline regimen of to long-acting preparations is

not effective.


- the patient's EGD and colonoscopy did not reveal a specific etiology for his 

pain.  The patient did have some gastritis which would not explain the 

generalized, severe abdominal pain that he is having.  Colonoscopy was 

essentially negative.


- Case discussed in detail with radiation oncology.  It was again felt that the 

paraspinal masses could potentially be contributing to the left back and flank 

pain but would unlikely be causing the generalized abdominal pain.  The left 

adrenal mass which appears to be larger, was close to the celiac plexus and 

could be causing pain due to local patient affect.  However his abdominal pain 

actually predates the increase in size of the adrenal mass.  In any case the 

paraspinal masses and the adrenal nodules are both going to be targeted with the

ongoing radiation.


- Pain is being managed by the pain service.  Await their recommendations - the 

patient will likely need increasing his long-acting the patient knows, and 

probably addition of an oral short acting preparation for the outpatient 

setting.  It may also be reasonable for the pain service to consider celiac 

plexus block.


Current Visit: No   Status: Acute   Priority: High   Code(s): R10.9 - 

UNSPECIFIED ABDOMINAL PAIN   SNOMED Code(s): 58374466


   





(2) COPD exacerbation


Narrative/Plan: 


this has improved with ongoing treatment with respiratory status is back to 

baseline


Current Visit: Yes   Status: Acute   Code(s): J44.1 - CHRONIC OBSTRUCTIVE 

PULMONARY DISEASE W (ACUTE) EXACERBATION   SNOMED Code(s): 736235228


   





(3) Primary small cell malignant neoplasm of lung, stage 4


Narrative/Plan: 


the patient is to start third line therapy with Lubrinectedin after discharge


Current Visit: No   Status: Acute   Code(s): C34.90 - MALIGNANT NEOPLASM OF UNSP

PART OF UNSP BRONCHUS OR LUNG   SNOMED Code(s): 61002809885316

## 2022-10-07 NOTE — P.PN
Subjective


Progress Note Date: 10/07/22





CHIEF COMPLAINT: Abdominal pain





HISTORY OF PRESENT ILLNESS: This is a 62-year-old male with a known history of 

metastatic lung cancer.  Patient currently undergoing radiation treatment.  

Patient is status post EGD and colonoscopy that shows gastritis, right colon 

lipoma and diverticulosis.  Patient does complain of the same abdominal pain.  

Denies any nausea or vomiting.  He is having bowel movements.  He is tolerating 

diet.  


Patient seen and examined with Dr. avendano





PHYSICAL EXAM: 


VITAL SIGNS: Reviewed.


GENERAL: Well-developed in no acute distress. 


HEENT:  No sclera icterus. Extraocular movements grossly intact.  Moist buccal 

mucosa. Head is atraumatic, normocephalic. 


ABDOMEN:  Soft.  Nondistended. 


NEUROLOGIC: Alert and oriented. Cranial nerves II through XII grossly intact.





ASSESSMENT: 


1.  Abdominal pain status post EGD and colonoscopy


2.  Lung cancer with metastatic disease


3.  Hypokalemia








PLAN: 


-Continue regular diet


-Continue PPI for gastritis


-Continue supportive care





Physician Assistant note has been reviewed by physician. Signing provider agrees

with the documented findings, assessment, and plan of care. 





Objective





- Vital Signs


Vital signs: 


                                   Vital Signs











Temp  97.7 F   10/07/22 14:00


 


Pulse  98   10/07/22 14:00


 


Resp  20   10/07/22 14:00


 


BP  122/57   10/07/22 14:00


 


Pulse Ox  92 L  10/07/22 14:00


 


FiO2  21   10/02/22 20:24








                                 Intake & Output











 10/06/22 10/07/22 10/07/22





 18:59 06:59 18:59


 


Intake Total 100  


 


Balance 100  


 


Intake:   


 


    


 


Other:   


 


  Voiding Method Toilet Toilet Toilet














- Labs


CBC & Chem 7: 


                                 10/06/22 05:34





                                 10/06/22 05:34

## 2022-10-07 NOTE — P.PN
Subjective


Progress Note Date: 10/07/22





Pt s/p EGD and Colonoscopy showing mild gastritis. He continues to complain of 

generalized abdominal pain as well as mid back pain that is severe and requiring

dilaudid. Hgb stable today. Pt receiving radiation therapy targeting paraspinal 

mass.





Objective





- Vital Signs


Vital signs: 


                                   Vital Signs











Temp  97.9 F   10/07/22 20:24


 


Pulse  100   10/07/22 20:24


 


Resp  16   10/07/22 20:24


 


BP  118/67   10/07/22 20:24


 


Pulse Ox  100   10/07/22 20:24


 


FiO2  21   10/02/22 20:24








                                 Intake & Output











 10/07/22 10/07/22 10/08/22





 06:59 18:59 06:59


 


Intake Total  240 


 


Balance  240 


 


Intake:   


 


  Oral  240 


 


Other:   


 


  Voiding Method Toilet Toilet 


 


  # Voids  2 














- Exam





Gen: well developed, well nourished NAD


CV: RRR, no murmur


Lungs: Wheezing. No rales


Neuro: alert and oriented x3, no focal deficit





- Labs


CBC & Chem 7: 


                                 10/06/22 05:34





                                 10/06/22 05:34





Assessment and Plan


Plan: 





Continue with radiation therapy, oncology and rad oncology following. Monitor 

Hgb. Pain control

## 2022-10-08 LAB
ANION GAP SERPL CALC-SCNC: 10.1 MMOL/L (ref 10–18)
BASOPHILS # BLD AUTO: 0.01 X 10*3/UL (ref 0–0.1)
BASOPHILS NFR BLD AUTO: 0.2 %
BUN SERPL-SCNC: 7 MG/DL (ref 9–27)
BUN/CREAT SERPL: 12.86 RATIO (ref 12–20)
CALCIUM SPEC-MCNC: 8.1 MG/DL (ref 8.7–10.3)
CHLORIDE SERPL-SCNC: 105 MMOL/L (ref 96–109)
CO2 SERPL-SCNC: 22.8 MMOL/L (ref 20–27.5)
EOSINOPHIL # BLD AUTO: 0.13 X 10*3/UL (ref 0.04–0.35)
EOSINOPHIL NFR BLD AUTO: 2.8 %
ERYTHROCYTE [DISTWIDTH] IN BLOOD BY AUTOMATED COUNT: 1.9 X 10*6/UL (ref 4.4–5.6)
ERYTHROCYTE [DISTWIDTH] IN BLOOD: 17.5 % (ref 11.5–14.5)
GLUCOSE SERPL-MCNC: 157 MG/DL (ref 70–110)
HCT VFR BLD AUTO: 22.4 % (ref 39.6–50)
HGB BLD-MCNC: 7.1 G/DL (ref 13–17)
IMM GRANULOCYTES BLD QL AUTO: 0.6 %
LYMPHOCYTES # SPEC AUTO: 0.84 X 10*3/UL (ref 0.9–5)
LYMPHOCYTES NFR SPEC AUTO: 18 %
MCH RBC QN AUTO: 37.4 PG (ref 27–32)
MCHC RBC AUTO-ENTMCNC: 31.7 G/DL (ref 32–37)
MCV RBC AUTO: 117.9 FL (ref 80–97)
MONOCYTES # BLD AUTO: 0.66 X 10*3/UL (ref 0.2–1)
MONOCYTES NFR BLD AUTO: 14.2 %
NEUTROPHILS # BLD AUTO: 2.99 X 10*3/UL (ref 1.8–7.7)
NEUTROPHILS NFR BLD AUTO: 64.2 %
NRBC BLD AUTO-RTO: 0.4 /100 WBCS (ref 0–0)
PLATELET # BLD AUTO: 84 X 10*3/UL (ref 140–440)
PLATELETS.RETICULATED NFR BLD AUTO: 4.1 % (ref 1.1–6.1)
POTASSIUM SERPL-SCNC: 4.1 MMOL/L (ref 3.5–5.5)
SODIUM SERPL-SCNC: 138 MMOL/L (ref 135–145)
WBC # BLD AUTO: 4.66 X 10*3/UL (ref 4.5–10)

## 2022-10-08 RX ADMIN — HYDROMORPHONE HYDROCHLORIDE PRN MG: 1 INJECTION, SOLUTION INTRAMUSCULAR; INTRAVENOUS; SUBCUTANEOUS at 07:37

## 2022-10-08 RX ADMIN — SUCRALFATE SCH GM: 1 TABLET ORAL at 17:12

## 2022-10-08 RX ADMIN — HYDROMORPHONE HYDROCHLORIDE PRN MG: 1 INJECTION, SOLUTION INTRAMUSCULAR; INTRAVENOUS; SUBCUTANEOUS at 21:05

## 2022-10-08 RX ADMIN — FAMOTIDINE SCH MG: 20 TABLET, FILM COATED ORAL at 07:18

## 2022-10-08 RX ADMIN — HYDROCORTISONE SCH MG: 10 TABLET ORAL at 13:31

## 2022-10-08 RX ADMIN — IPRATROPIUM BROMIDE AND ALBUTEROL SULFATE SCH ML: .5; 3 SOLUTION RESPIRATORY (INHALATION) at 16:00

## 2022-10-08 RX ADMIN — ATORVASTATIN CALCIUM SCH MG: 20 TABLET, FILM COATED ORAL at 07:18

## 2022-10-08 RX ADMIN — BUDESONIDE SCH MG: 0.25 SUSPENSION RESPIRATORY (INHALATION) at 20:15

## 2022-10-08 RX ADMIN — GABAPENTIN SCH MG: 100 CAPSULE ORAL at 21:08

## 2022-10-08 RX ADMIN — MORPHINE SULFATE PRN MG: 15 TABLET, EXTENDED RELEASE ORAL at 18:47

## 2022-10-08 RX ADMIN — IPRATROPIUM BROMIDE AND ALBUTEROL SULFATE SCH ML: .5; 3 SOLUTION RESPIRATORY (INHALATION) at 20:15

## 2022-10-08 RX ADMIN — ENOXAPARIN SODIUM SCH MG: 30 INJECTION SUBCUTANEOUS at 07:18

## 2022-10-08 RX ADMIN — HYDROMORPHONE HYDROCHLORIDE PRN MG: 1 INJECTION, SOLUTION INTRAMUSCULAR; INTRAVENOUS; SUBCUTANEOUS at 04:06

## 2022-10-08 RX ADMIN — HYDROCORTISONE SCH MG: 20 TABLET ORAL at 07:19

## 2022-10-08 RX ADMIN — SUCRALFATE SCH GM: 1 TABLET ORAL at 07:18

## 2022-10-08 RX ADMIN — IPRATROPIUM BROMIDE AND ALBUTEROL SULFATE SCH ML: .5; 3 SOLUTION RESPIRATORY (INHALATION) at 11:55

## 2022-10-08 RX ADMIN — BUDESONIDE SCH MG: 0.25 SUSPENSION RESPIRATORY (INHALATION) at 08:02

## 2022-10-08 RX ADMIN — PANTOPRAZOLE SODIUM SCH MG: 40 TABLET, DELAYED RELEASE ORAL at 21:08

## 2022-10-08 RX ADMIN — HYDROMORPHONE HYDROCHLORIDE PRN MG: 1 INJECTION, SOLUTION INTRAMUSCULAR; INTRAVENOUS; SUBCUTANEOUS at 11:29

## 2022-10-08 RX ADMIN — IPRATROPIUM BROMIDE AND ALBUTEROL SULFATE PRN ML: .5; 3 SOLUTION RESPIRATORY (INHALATION) at 08:03

## 2022-10-08 RX ADMIN — PANTOPRAZOLE SODIUM SCH MG: 40 TABLET, DELAYED RELEASE ORAL at 07:18

## 2022-10-08 RX ADMIN — GABAPENTIN SCH MG: 100 CAPSULE ORAL at 07:18

## 2022-10-08 RX ADMIN — METOPROLOL TARTRATE SCH MG: 25 TABLET, FILM COATED ORAL at 07:18

## 2022-10-08 RX ADMIN — SUCRALFATE SCH GM: 1 TABLET ORAL at 13:31

## 2022-10-08 RX ADMIN — HYDROMORPHONE HYDROCHLORIDE PRN MG: 1 INJECTION, SOLUTION INTRAMUSCULAR; INTRAVENOUS; SUBCUTANEOUS at 07:15

## 2022-10-08 RX ADMIN — MORPHINE SULFATE PRN MG: 30 TABLET, FILM COATED, EXTENDED RELEASE ORAL at 10:26

## 2022-10-08 RX ADMIN — HYDROMORPHONE HYDROCHLORIDE PRN MG: 1 INJECTION, SOLUTION INTRAMUSCULAR; INTRAVENOUS; SUBCUTANEOUS at 16:10

## 2022-10-08 RX ADMIN — SUCRALFATE SCH GM: 1 TABLET ORAL at 21:08

## 2022-10-08 RX ADMIN — METOPROLOL TARTRATE SCH MG: 25 TABLET, FILM COATED ORAL at 21:08

## 2022-10-08 NOTE — P.PN
Progress Note - Text


Progress Note Date: 10/08/22





Patient [place] stable.  He still has some vague complaints of abdominal pain.





Status post metastatic lung cancer.  There is no surgical intervention planned 

for the patient's abdominal pain.

## 2022-10-09 LAB
ALBUMIN SERPL-MCNC: 3.3 G/DL (ref 3.8–4.9)
ALBUMIN/GLOB SERPL: 1.24 G/DL (ref 1.6–3.17)
ALP SERPL-CCNC: 81 U/L (ref 41–126)
ALT SERPL-CCNC: 26 U/L (ref 10–49)
ANION GAP SERPL CALC-SCNC: 9.9 MMOL/L (ref 10–18)
AST SERPL-CCNC: 21 U/L (ref 14–35)
BASOPHILS # BLD AUTO: 0.02 X 10*3/UL (ref 0–0.1)
BASOPHILS NFR BLD AUTO: 0.4 %
BUN SERPL-SCNC: 7.7 MG/DL (ref 9–27)
BUN/CREAT SERPL: 12.08 RATIO (ref 12–20)
CALCIUM SPEC-MCNC: 8.5 MG/DL (ref 8.7–10.3)
CHLORIDE SERPL-SCNC: 98 MMOL/L (ref 96–109)
CO2 SERPL-SCNC: 25.7 MMOL/L (ref 20–27.5)
EOSINOPHIL # BLD AUTO: 0.14 X 10*3/UL (ref 0.04–0.35)
EOSINOPHIL NFR BLD AUTO: 2.7 %
ERYTHROCYTE [DISTWIDTH] IN BLOOD BY AUTOMATED COUNT: 2.11 X 10*6/UL (ref 4.4–5.6)
ERYTHROCYTE [DISTWIDTH] IN BLOOD: 17.4 % (ref 11.5–14.5)
GLOBULIN SER CALC-MCNC: 2.7 G/DL (ref 1.6–3.3)
GLUCOSE SERPL-MCNC: 154 MG/DL (ref 70–110)
HCT VFR BLD AUTO: 24.7 % (ref 39.6–50)
HGB BLD-MCNC: 7.9 G/DL (ref 13–17)
IMM GRANULOCYTES BLD QL AUTO: 0.8 %
LYMPHOCYTES # SPEC AUTO: 0.76 X 10*3/UL (ref 0.9–5)
LYMPHOCYTES NFR SPEC AUTO: 14.8 %
MCH RBC QN AUTO: 37.4 PG (ref 27–32)
MCHC RBC AUTO-ENTMCNC: 32 G/DL (ref 32–37)
MCV RBC AUTO: 117.1 FL (ref 80–97)
MONOCYTES # BLD AUTO: 0.84 X 10*3/UL (ref 0.2–1)
MONOCYTES NFR BLD AUTO: 16.3 %
NEUTROPHILS # BLD AUTO: 3.35 X 10*3/UL (ref 1.8–7.7)
NEUTROPHILS NFR BLD AUTO: 65 %
NRBC BLD AUTO-RTO: 0.4 /100 WBCS (ref 0–0)
PLATELET # BLD AUTO: 85 X 10*3/UL (ref 140–440)
POTASSIUM SERPL-SCNC: 4.2 MMOL/L (ref 3.5–5.5)
PROT SERPL-MCNC: 6 G/DL (ref 6.2–8.2)
SODIUM SERPL-SCNC: 134 MMOL/L (ref 135–145)
WBC # BLD AUTO: 5.15 X 10*3/UL (ref 4.5–10)

## 2022-10-09 RX ADMIN — HYDROCORTISONE SCH MG: 20 TABLET ORAL at 07:22

## 2022-10-09 RX ADMIN — METOPROLOL TARTRATE SCH MG: 25 TABLET, FILM COATED ORAL at 07:22

## 2022-10-09 RX ADMIN — BUDESONIDE SCH MG: 0.25 SUSPENSION RESPIRATORY (INHALATION) at 07:32

## 2022-10-09 RX ADMIN — FAMOTIDINE SCH MG: 20 TABLET, FILM COATED ORAL at 07:22

## 2022-10-09 RX ADMIN — IPRATROPIUM BROMIDE AND ALBUTEROL SULFATE SCH ML: .5; 3 SOLUTION RESPIRATORY (INHALATION) at 15:23

## 2022-10-09 RX ADMIN — IPRATROPIUM BROMIDE AND ALBUTEROL SULFATE SCH ML: .5; 3 SOLUTION RESPIRATORY (INHALATION) at 19:30

## 2022-10-09 RX ADMIN — ENOXAPARIN SODIUM SCH MG: 30 INJECTION SUBCUTANEOUS at 07:22

## 2022-10-09 RX ADMIN — HYDROMORPHONE HYDROCHLORIDE PRN MG: 1 INJECTION, SOLUTION INTRAMUSCULAR; INTRAVENOUS; SUBCUTANEOUS at 01:05

## 2022-10-09 RX ADMIN — HYDROMORPHONE HYDROCHLORIDE PRN MG: 1 INJECTION, SOLUTION INTRAMUSCULAR; INTRAVENOUS; SUBCUTANEOUS at 07:23

## 2022-10-09 RX ADMIN — HYDROMORPHONE HYDROCHLORIDE PRN MG: 1 INJECTION, SOLUTION INTRAMUSCULAR; INTRAVENOUS; SUBCUTANEOUS at 23:44

## 2022-10-09 RX ADMIN — PANTOPRAZOLE SODIUM SCH MG: 40 TABLET, DELAYED RELEASE ORAL at 07:22

## 2022-10-09 RX ADMIN — IPRATROPIUM BROMIDE AND ALBUTEROL SULFATE SCH ML: .5; 3 SOLUTION RESPIRATORY (INHALATION) at 11:07

## 2022-10-09 RX ADMIN — HYDROMORPHONE HYDROCHLORIDE PRN MG: 1 INJECTION, SOLUTION INTRAMUSCULAR; INTRAVENOUS; SUBCUTANEOUS at 11:38

## 2022-10-09 RX ADMIN — METOPROLOL TARTRATE SCH MG: 25 TABLET, FILM COATED ORAL at 19:47

## 2022-10-09 RX ADMIN — SUCRALFATE SCH GM: 1 TABLET ORAL at 07:22

## 2022-10-09 RX ADMIN — SUCRALFATE SCH GM: 1 TABLET ORAL at 11:37

## 2022-10-09 RX ADMIN — GABAPENTIN SCH MG: 100 CAPSULE ORAL at 07:22

## 2022-10-09 RX ADMIN — HYDROCORTISONE SCH MG: 10 TABLET ORAL at 11:37

## 2022-10-09 RX ADMIN — MORPHINE SULFATE PRN MG: 15 TABLET, EXTENDED RELEASE ORAL at 10:32

## 2022-10-09 RX ADMIN — HYDROMORPHONE HYDROCHLORIDE PRN MG: 1 INJECTION, SOLUTION INTRAMUSCULAR; INTRAVENOUS; SUBCUTANEOUS at 19:46

## 2022-10-09 RX ADMIN — BUDESONIDE SCH MG: 0.25 SUSPENSION RESPIRATORY (INHALATION) at 19:30

## 2022-10-09 RX ADMIN — PANTOPRAZOLE SODIUM SCH MG: 40 TABLET, DELAYED RELEASE ORAL at 19:47

## 2022-10-09 RX ADMIN — SUCRALFATE SCH GM: 1 TABLET ORAL at 15:44

## 2022-10-09 RX ADMIN — ATORVASTATIN CALCIUM SCH MG: 20 TABLET, FILM COATED ORAL at 07:22

## 2022-10-09 RX ADMIN — GABAPENTIN SCH MG: 100 CAPSULE ORAL at 19:47

## 2022-10-09 RX ADMIN — SUCRALFATE SCH GM: 1 TABLET ORAL at 19:47

## 2022-10-09 RX ADMIN — HYDROMORPHONE HYDROCHLORIDE PRN MG: 1 INJECTION, SOLUTION INTRAMUSCULAR; INTRAVENOUS; SUBCUTANEOUS at 15:44

## 2022-10-09 RX ADMIN — IPRATROPIUM BROMIDE AND ALBUTEROL SULFATE SCH ML: .5; 3 SOLUTION RESPIRATORY (INHALATION) at 07:32

## 2022-10-09 NOTE — PN
PROGRESS NOTE



SUBJECTIVE:

This 62-year-old gentleman admitted with possible , complaining of severe back

pain.  The patient with multiple medications at home, pain medication adjusted. 
The

patient apparently also has some confusion present on admission.



PAST MEDICAL HISTORY:

Reviewed.



REVIEW OF SYSTEMS:

A 14-point review is negative as mentioned earlier.



CURRENT MEDICATIONS:

Reviewed include Dilaudid and also some medications reviewed.



PHYSICAL EXAMINATION:

VITAL SIGNS:  Pulse is 100, blood pressure 101/60, respirations 18.

HEENT:  Conjunctivae normal.

NECK:  No jugular vein distention.

RESPIRATIONS:  Diminished at the bases.  Few scattered rhonchi and crackles.

ABDOMEN:  Soft, nontender.

LEGS:  No edema.

NERVOUS SYSTEM:  No focal deficits.



LABS:

Reviewed, hemoglobin 7.1.  The rest of the labs are noted.



ASSESSMENT:

1. Diffuse pain and back pain, possibly secondary to metastatic malignancy.

2. History of chronic obstructive pulmonary disease.

3. Gastroesophageal reflux disease.

4. History of lung cancer with mets.

5. Anxiety, depression.



RECOMMENDATIONS:

1. Recommended to continue current management and I would increase the dose of 
long-

    acting morphine to 45 mg and cut down the IV dose of Dilaudid.

2. Otherwise, DVT prophylaxis.  Continue the rest of medications.

3. Prognosis guarded because of the multiple complex medical issues and further

    recommendations to follow.  See orders for details.





MMODL / IJN: [id number] / Job#: [id number]

MTDD

## 2022-10-09 NOTE — P.PN
Progress Note - Text


Progress Note Date: 10/09/22





Patient remains unchanged.  He still has some mild vague abdominal pain.





On exam vital signs are stable.  Abdomen soft minimally diffuse.





Metastatic lung cancer.





Chronic abdominal pain.





Patient will continue receive supportive care.

## 2022-10-10 LAB
ANION GAP SERPL CALC-SCNC: 7.3 MMOL/L (ref 10–18)
BASOPHILS # BLD AUTO: 0.02 X 10*3/UL (ref 0–0.1)
BASOPHILS NFR BLD AUTO: 0.4 %
BUN SERPL-SCNC: 9.8 MG/DL (ref 9–27)
BUN/CREAT SERPL: 16.33 RATIO (ref 12–20)
CALCIUM SPEC-MCNC: 8.3 MG/DL (ref 8.7–10.3)
CHLORIDE SERPL-SCNC: 102 MMOL/L (ref 96–109)
CO2 SERPL-SCNC: 27.7 MMOL/L (ref 20–27.5)
EOSINOPHIL # BLD AUTO: 0.13 X 10*3/UL (ref 0.04–0.35)
EOSINOPHIL NFR BLD AUTO: 2.5 %
ERYTHROCYTE [DISTWIDTH] IN BLOOD BY AUTOMATED COUNT: 2.07 X 10*6/UL (ref 4.4–5.6)
ERYTHROCYTE [DISTWIDTH] IN BLOOD: 17.2 % (ref 11.5–14.5)
GLUCOSE SERPL-MCNC: 108 MG/DL (ref 70–110)
HCT VFR BLD AUTO: 24 % (ref 39.6–50)
HGB BLD-MCNC: 7.7 G/DL (ref 13–17)
IMM GRANULOCYTES BLD QL AUTO: 0.9 %
LYMPHOCYTES # SPEC AUTO: 0.95 X 10*3/UL (ref 0.9–5)
LYMPHOCYTES NFR SPEC AUTO: 18 %
MCH RBC QN AUTO: 37.2 PG (ref 27–32)
MCHC RBC AUTO-ENTMCNC: 32.1 G/DL (ref 32–37)
MCV RBC AUTO: 115.9 FL (ref 80–97)
MONOCYTES # BLD AUTO: 0.82 X 10*3/UL (ref 0.2–1)
MONOCYTES NFR BLD AUTO: 15.5 %
NEUTROPHILS # BLD AUTO: 3.31 X 10*3/UL (ref 1.8–7.7)
NEUTROPHILS NFR BLD AUTO: 62.7 %
NRBC BLD AUTO-RTO: 0 /100 WBCS (ref 0–0)
PLATELET # BLD AUTO: 87 X 10*3/UL (ref 140–440)
POTASSIUM SERPL-SCNC: 4.5 MMOL/L (ref 3.5–5.5)
SODIUM SERPL-SCNC: 137 MMOL/L (ref 135–145)
WBC # BLD AUTO: 5.28 X 10*3/UL (ref 4.5–10)

## 2022-10-10 PROCEDURE — DP0C4ZZ BEAM RADIATION OF OTHER BONE USING HEAVY PARTICLES (PROTONS,IONS): ICD-10-PCS

## 2022-10-10 RX ADMIN — IPRATROPIUM BROMIDE AND ALBUTEROL SULFATE SCH ML: .5; 3 SOLUTION RESPIRATORY (INHALATION) at 15:55

## 2022-10-10 RX ADMIN — HYDROMORPHONE HYDROCHLORIDE PRN MG: 1 INJECTION, SOLUTION INTRAMUSCULAR; INTRAVENOUS; SUBCUTANEOUS at 14:11

## 2022-10-10 RX ADMIN — METOPROLOL TARTRATE SCH MG: 25 TABLET, FILM COATED ORAL at 10:10

## 2022-10-10 RX ADMIN — HYDROMORPHONE HYDROCHLORIDE PRN MG: 1 INJECTION, SOLUTION INTRAMUSCULAR; INTRAVENOUS; SUBCUTANEOUS at 23:30

## 2022-10-10 RX ADMIN — IPRATROPIUM BROMIDE AND ALBUTEROL SULFATE SCH ML: .5; 3 SOLUTION RESPIRATORY (INHALATION) at 07:17

## 2022-10-10 RX ADMIN — PANTOPRAZOLE SODIUM SCH MG: 40 TABLET, DELAYED RELEASE ORAL at 19:47

## 2022-10-10 RX ADMIN — BUDESONIDE SCH MG: 0.25 SUSPENSION RESPIRATORY (INHALATION) at 07:17

## 2022-10-10 RX ADMIN — HYDROCORTISONE SCH MG: 20 TABLET ORAL at 07:47

## 2022-10-10 RX ADMIN — HYDROMORPHONE HYDROCHLORIDE PRN MG: 1 INJECTION, SOLUTION INTRAMUSCULAR; INTRAVENOUS; SUBCUTANEOUS at 07:46

## 2022-10-10 RX ADMIN — SUCRALFATE SCH GM: 1 TABLET ORAL at 17:13

## 2022-10-10 RX ADMIN — HYDROMORPHONE HYDROCHLORIDE PRN MG: 1 INJECTION, SOLUTION INTRAMUSCULAR; INTRAVENOUS; SUBCUTANEOUS at 03:53

## 2022-10-10 RX ADMIN — ATORVASTATIN CALCIUM SCH MG: 20 TABLET, FILM COATED ORAL at 10:09

## 2022-10-10 RX ADMIN — HYDROMORPHONE HYDROCHLORIDE PRN MG: 1 INJECTION, SOLUTION INTRAMUSCULAR; INTRAVENOUS; SUBCUTANEOUS at 17:13

## 2022-10-10 RX ADMIN — SUCRALFATE SCH GM: 1 TABLET ORAL at 07:47

## 2022-10-10 RX ADMIN — HYDROMORPHONE HYDROCHLORIDE PRN MG: 1 INJECTION, SOLUTION INTRAMUSCULAR; INTRAVENOUS; SUBCUTANEOUS at 10:59

## 2022-10-10 RX ADMIN — ENOXAPARIN SODIUM SCH MG: 30 INJECTION SUBCUTANEOUS at 10:08

## 2022-10-10 RX ADMIN — IPRATROPIUM BROMIDE AND ALBUTEROL SULFATE SCH ML: .5; 3 SOLUTION RESPIRATORY (INHALATION) at 19:44

## 2022-10-10 RX ADMIN — METOPROLOL TARTRATE SCH MG: 25 TABLET, FILM COATED ORAL at 19:47

## 2022-10-10 RX ADMIN — HYDROMORPHONE HYDROCHLORIDE PRN MG: 1 INJECTION, SOLUTION INTRAMUSCULAR; INTRAVENOUS; SUBCUTANEOUS at 20:34

## 2022-10-10 RX ADMIN — IPRATROPIUM BROMIDE AND ALBUTEROL SULFATE SCH ML: .5; 3 SOLUTION RESPIRATORY (INHALATION) at 10:36

## 2022-10-10 RX ADMIN — SUCRALFATE SCH GM: 1 TABLET ORAL at 12:58

## 2022-10-10 RX ADMIN — FAMOTIDINE SCH MG: 20 TABLET, FILM COATED ORAL at 10:09

## 2022-10-10 RX ADMIN — HYDROCORTISONE SCH MG: 10 TABLET ORAL at 14:11

## 2022-10-10 RX ADMIN — SUCRALFATE SCH GM: 1 TABLET ORAL at 19:47

## 2022-10-10 RX ADMIN — GABAPENTIN SCH MG: 100 CAPSULE ORAL at 19:47

## 2022-10-10 RX ADMIN — BUDESONIDE SCH MG: 0.25 SUSPENSION RESPIRATORY (INHALATION) at 19:44

## 2022-10-10 RX ADMIN — GABAPENTIN SCH MG: 100 CAPSULE ORAL at 10:10

## 2022-10-10 RX ADMIN — PANTOPRAZOLE SODIUM SCH MG: 40 TABLET, DELAYED RELEASE ORAL at 10:10

## 2022-10-10 NOTE — P.PN
Subjective


Progress Note Date: 10/10/22





CHIEF COMPLAINT: Abdominal pain





HISTORY OF PRESENT ILLNESS: This is a 62-year-old male with a known history of 

metastatic lung cancer.  Patient currently undergoing radiation treatment.  

Patient is status post EGD and colonoscopy that shows gastritis, right colon 

lipoma and diverticulosis.  Patient does complain of the same abdominal pain.  

He reports that his pain medication has been adjusted by oncology.  He feels his

pain is better controlled.  He is tiring diet.  Denies any nausea vomiting.  He 

is having bowel movements.  He is scheduled for radiation treatment today and 

again tomorrow.  Patient reports that he thinks he is being discharged tomorrow.

 Afebrile.  Mild tachycardia.  WBC is 5.2 a hemoglobin 7.7 platelets are 87 

sodium is 137 potassium is 4.5 creatinine 0.6





Patient seen and examined with Dr. avendano





PHYSICAL EXAM: 


VITAL SIGNS: Reviewed.


GENERAL: Well-developed in no acute distress. 


HEENT:  No sclera icterus. Extraocular movements grossly intact.  Moist buccal 

mucosa. Head is atraumatic, normocephalic. 


ABDOMEN:  Soft.  Nondistended. 


NEUROLOGIC: Alert and oriented. Cranial nerves II through XII grossly intact.





ASSESSMENT: 


1.  Abdominal pain status post EGD and colonoscopy


2.  Lung cancer with metastatic disease








PLAN: 


-Continue regular diet


-Continue PPI for gastritis


-Continue supportive care


-Patient can be discharged and surgical service when medically cleared





Physician Assistant note has been reviewed by physician. Signing provider agrees

with the documented findings, assessment, and plan of care. 





Objective





- Vital Signs


Vital signs: 


                                   Vital Signs











Temp  97.7 F   10/10/22 08:00


 


Pulse  104 H  10/10/22 10:51


 


Resp  20   10/10/22 08:00


 


BP  114/69   10/10/22 08:00


 


Pulse Ox  95   10/10/22 08:00


 


FiO2  21   10/09/22 19:30








                                 Intake & Output











 10/09/22 10/10/22 10/10/22





 18:59 06:59 18:59


 


Intake Total 240  


 


Balance 240  


 


Weight   77.564 kg


 


Intake:   


 


  Oral 240  


 


Other:   


 


  Voiding Method Toilet Toilet 


 


  # Voids 2 1 














- Labs


CBC & Chem 7: 


                                 10/10/22 06:11





                                 10/10/22 06:11


Labs: 


                  Abnormal Lab Results - Last 24 Hours (Table)











  10/10/22 10/10/22 Range/Units





  06:11 06:11 


 


RBC  2.07 L   (4.40-5.60)  X 10*6/uL


 


Hgb  7.7 L   (13.0-17.0)  g/dL


 


Hct  24.0 L   (39.6-50.0)  %


 


MCV  115.9 H   (80.0-97.0)  fL


 


MCH  37.2 H   (27.0-32.0)  pg


 


RDW  17.2 H   (11.5-14.5)  %


 


Plt Count  87 L   (140-440)  X 10*3/uL


 


Immature Gran #  0.05 H   (0.00-0.04)  X 10*3/uL


 


Carbon Dioxide   27.7 H  (20.0-27.5)  mmol/L


 


Anion Gap   7.30 L  (10.00-18.00)  mmol/L


 


Calcium   8.3 L  (8.7-10.3)  mg/dL

## 2022-10-10 NOTE — P.PN
Subjective


Progress Note Date: 10/10/22





Pt feels his abdominal pain is slowly improving but the back pain remains 

severe. He continues with radiation treatments. His EGD and colonoscopy are 

negative for malignancy. Pt requiring dilaudid around the clock.





Objective





- Vital Signs


Vital signs: 


                                   Vital Signs











Temp  98.0 F   10/10/22 19:22


 


Pulse  106 H  10/10/22 19:56


 


Resp  16   10/10/22 19:22


 


BP  107/62   10/10/22 19:22


 


Pulse Ox  97   10/10/22 19:44


 


FiO2  21   10/10/22 19:44








                                 Intake & Output











 10/10/22 10/10/22 10/11/22





 06:59 18:59 06:59


 


Weight  77.564 kg 


 


Other:   


 


  Voiding Method Toilet  Toilet


 


  # Voids 1  














- Exam





Gen: well developed, well nourished NAD


CV: RRR, no murmur


Lungs: Wheezing. No rales


Neuro: alert and oriented x3, no focal deficit





- Labs


CBC & Chem 7: 


                                 10/10/22 06:11





                                 10/10/22 06:11


Labs: 


                  Abnormal Lab Results - Last 24 Hours (Table)











  10/10/22 10/10/22 Range/Units





  06:11 06:11 


 


RBC  2.07 L   (4.40-5.60)  X 10*6/uL


 


Hgb  7.7 L   (13.0-17.0)  g/dL


 


Hct  24.0 L   (39.6-50.0)  %


 


MCV  115.9 H   (80.0-97.0)  fL


 


MCH  37.2 H   (27.0-32.0)  pg


 


RDW  17.2 H   (11.5-14.5)  %


 


Plt Count  87 L   (140-440)  X 10*3/uL


 


Immature Gran #  0.05 H   (0.00-0.04)  X 10*3/uL


 


Carbon Dioxide   27.7 H  (20.0-27.5)  mmol/L


 


Anion Gap   7.30 L  (10.00-18.00)  mmol/L


 


Calcium   8.3 L  (8.7-10.3)  mg/dL














Assessment and Plan


Plan: 





Increase fentanyl to 50 mcg. Continue dilaudid. Continue with protonix and 

carafate. Complete radiation. Oncology following

## 2022-10-10 NOTE — PN
PROGRESS NOTE



SUBJECTIVE:

This 62-year-old gentleman, who was admitted with multiple medical problems,

complaining of significant back pain, medication being arranged at this time.  EGD and

colonoscopy showed only mild gastritis.  There is no history of any fever, rigors, or

chills at this time.



PAST MEDICAL HISTORY:

Reviewed.



PHYSICAL EXAMINATION:

VITAL SIGNS:  Pulse is 100, blood pressure is 115/64, respirations 20.

CHEST:  Clear to auscultation.

CARDIOVASCULAR:  S1, S2.

ABDOMEN:  Soft.

NERVOUS SYSTEM:  Nonfocal.



LABORATORY DATA:

Hemoglobin 7.9.  Rest of the labs noted.



ASSESSMENT:

1. Abdominal pain and back pain.

2. Chronic obstructive pulmonary disease.

3. Small cell malignant neoplasm with mets.



RECOMMENDATIONS AND DISCUSSION:

I recommend to continue current management and symptomatic treatment and adjust the

pain medications.  Dr. Ortiz will follow.  See orders for details.  We will repeat

the labs also tomorrow.





MMODL / IJN: [id number] / Job#: [id number]

## 2022-10-10 NOTE — P.PN
Subjective


Progress Note Date: 10/10/22


Principal diagnosis: 





COPD, intractable pain.  Small cell lung cancer





In follow-up today patient has had his EGD and colonoscopy, no evidence of 

malignancy.  Patient is going to be completing radiation tomorrow.  We discussed

his back pain which is not new back pain, just worse since had the cancer.  

Patient has never had a pain score of 0, even before cancer.  He has had some 

medication adjustments, he continues to not have adequate relief.  Per his 

reports.  Patient basically watches the clock and asks for Dilaudid when it's 

due





Objective





- Vital Signs


Vital signs: 


                                   Vital Signs











Temp  98.1 F   10/10/22 14:00


 


Pulse  104 H  10/10/22 16:05


 


Resp  20   10/10/22 14:00


 


BP  137/74   10/10/22 14:00


 


Pulse Ox  95   10/10/22 14:00


 


FiO2  21   10/09/22 19:30








                                 Intake & Output











 10/09/22 10/10/22 10/10/22





 18:59 06:59 18:59


 


Intake Total 240  


 


Balance 240  


 


Weight   77.564 kg


 


Intake:   


 


  Oral 240  


 


Other:   


 


  Voiding Method Toilet Toilet 


 


  # Voids 2 1 














- Constitutional


General appearance: Present: average body habitus, cooperative, no acute 

distress





- EENT


Eyes: Present: anicteric sclerae, EOMI


ENT: Present: hearing grossly normal





- Respiratory


Respiratory: bilateral: CTA, diminished





- Cardiovascular


Rhythm: regular


Heart sounds: normal: S1, S2


Abnormal Heart Sounds: Absent: systolic murmur, diastolic murmur, rub, S3 

Gallop, S4 Gallop, click, other





- Peripheral edema


  ** leg


Peripheral Edema: bilateral: None





- Gastrointestinal


General gastrointestinal: Present: normal bowel sounds, soft





- Integumentary


Integumentary: Present: pale





- Neurologic


Neurologic: Present: CNII-XII intact (Grossly)





- Musculoskeletal


Musculoskeletal: Present: generalized weakness





- Psychiatric


Psychiatric: Present: A&O x's 3, appropriate affect, intact judgment & insight





- Labs


CBC & Chem 7: 


                                 10/10/22 06:11





                                 10/10/22 06:11


Labs: 


                  Abnormal Lab Results - Last 24 Hours (Table)











  10/10/22 10/10/22 Range/Units





  06:11 06:11 


 


RBC  2.07 L   (4.40-5.60)  X 10*6/uL


 


Hgb  7.7 L   (13.0-17.0)  g/dL


 


Hct  24.0 L   (39.6-50.0)  %


 


MCV  115.9 H   (80.0-97.0)  fL


 


MCH  37.2 H   (27.0-32.0)  pg


 


RDW  17.2 H   (11.5-14.5)  %


 


Plt Count  87 L   (140-440)  X 10*3/uL


 


Immature Gran #  0.05 H   (0.00-0.04)  X 10*3/uL


 


Carbon Dioxide   27.7 H  (20.0-27.5)  mmol/L


 


Anion Gap   7.30 L  (10.00-18.00)  mmol/L


 


Calcium   8.3 L  (8.7-10.3)  mg/dL














Assessment and Plan


(1) COPD exacerbation


Current Visit: Yes   Status: Acute   Priority: High   Code(s): J44.1 - CHRONIC 

OBSTRUCTIVE PULMONARY DISEASE W (ACUTE) EXACERBATION   SNOMED Code(s): 330669809


   





(2) Back pain


Current Visit: Yes   Status: Chronic   Priority: Medium   Code(s): M54.9 - 

DORSALGIA, UNSPECIFIED   SNOMED Code(s): 932358073


   





(3) Small cell lung cancer


Current Visit: No   Status: Chronic   Priority: Medium   Code(s): C34.90 - 

MALIGNANT NEOPLASM OF UNSP PART OF UNSP BRONCHUS OR LUNG   SNOMED Code(s): 

487203045


   


Plan: 





EGD and colonoscopy were negative for any acute process, biopsies negative for 

malignancy.  We discussed patient's abdominal pain, he reports is as 

intermittent, does not last for long periods of time, not progressive at this 

time.  He actually gets the most relief from abd pain when he takes Carafate. He

is taking Protonix as well.  Patient will continue on the same.





  Patient's main complaint continues to be his back.  This was a problem before 

cancer and has progressed since having cancer.  Patient agrees that he has never

had a pain score close to 0.  We came to an agreement that pain is likely only 

going to get as good as 2-4.  Patient states that he agrees with that.  We 

reviewed medication list in detail.  The patient was taking extended release 

morphine as needed.  I told him that wasn't realistic, taking a long acting pain

medication as needed is not effective for pain control.  He said it doesn't work

anyways so, this was discontinued.  He said that his fentanyl patch was being 

increase today.  Therefore, patient will be on the fentanyl patch, now at a dose

of 50 g.  It was very obvious during my exam that the patient watches the clock

for when his next dose of Dilaudid is due.  I told the patient that I'm not 

going to medicate his mind but, I will work on treating his actual back pain.  

It is his job to ask for the pain medication when he is having pain-NOT because 

the clock says so- with our agreed upon pain goal in mind.  He verbalized that 

he understood what I was saying and agreed but, began to argue with the RN about

how he will be due for a dose of dilaudid in the next 10min because of the order

change.  We will see how much Dilaudid he is used in the next 24 hours AND what 

his pain score reports are.  If they continue to be the same despite increasing 

fentanyl as well as increasing frequency of Dilaudid then next recommendation 

would be for nerve block but, that is a decision for Pain Management.  Did 

increase his Neurontin.

## 2022-10-11 VITALS — SYSTOLIC BLOOD PRESSURE: 122 MMHG | HEART RATE: 112 BPM | DIASTOLIC BLOOD PRESSURE: 63 MMHG | TEMPERATURE: 98.1 F

## 2022-10-11 VITALS — RESPIRATION RATE: 16 BRPM

## 2022-10-11 RX ADMIN — GABAPENTIN SCH MG: 100 CAPSULE ORAL at 09:43

## 2022-10-11 RX ADMIN — METOPROLOL TARTRATE SCH MG: 25 TABLET, FILM COATED ORAL at 09:41

## 2022-10-11 RX ADMIN — SUCRALFATE SCH GM: 1 TABLET ORAL at 07:49

## 2022-10-11 RX ADMIN — HYDROCORTISONE SCH MG: 20 TABLET ORAL at 07:49

## 2022-10-11 RX ADMIN — ATORVASTATIN CALCIUM SCH MG: 20 TABLET, FILM COATED ORAL at 09:41

## 2022-10-11 RX ADMIN — PANTOPRAZOLE SODIUM SCH MG: 40 TABLET, DELAYED RELEASE ORAL at 09:41

## 2022-10-11 RX ADMIN — FAMOTIDINE SCH MG: 20 TABLET, FILM COATED ORAL at 09:41

## 2022-10-11 RX ADMIN — IPRATROPIUM BROMIDE AND ALBUTEROL SULFATE SCH ML: .5; 3 SOLUTION RESPIRATORY (INHALATION) at 07:16

## 2022-10-11 RX ADMIN — HYDROMORPHONE HYDROCHLORIDE PRN MG: 1 INJECTION, SOLUTION INTRAMUSCULAR; INTRAVENOUS; SUBCUTANEOUS at 03:13

## 2022-10-11 RX ADMIN — HYDROMORPHONE HYDROCHLORIDE PRN MG: 1 INJECTION, SOLUTION INTRAMUSCULAR; INTRAVENOUS; SUBCUTANEOUS at 07:49

## 2022-10-11 RX ADMIN — BUDESONIDE SCH MG: 0.25 SUSPENSION RESPIRATORY (INHALATION) at 07:16

## 2022-10-11 RX ADMIN — ENOXAPARIN SODIUM SCH MG: 30 INJECTION SUBCUTANEOUS at 09:41

## 2022-10-11 RX ADMIN — IPRATROPIUM BROMIDE AND ALBUTEROL SULFATE SCH ML: .5; 3 SOLUTION RESPIRATORY (INHALATION) at 10:36

## 2022-10-11 RX ADMIN — SUCRALFATE SCH GM: 1 TABLET ORAL at 12:26

## 2022-10-11 NOTE — P.PN
Subjective


Progress Note Date: 10/11/22


Principal diagnosis: 





COPD, intractable pain.  Small cell lung cancer





In follow-up today patient will be completing radiation later on this morning.  

Despite changes to medications, patient doesn't report much improvement in his 

pain.  





Objective





- Vital Signs


Vital signs: 


                                   Vital Signs











Temp  97.6 F   10/11/22 08:00


 


Pulse  104 H  10/11/22 10:47


 


Resp  16   10/11/22 08:00


 


BP  124/56   10/11/22 08:00


 


Pulse Ox  97   10/11/22 09:30


 


FiO2  21   10/10/22 19:44








                                 Intake & Output











 10/10/22 10/11/22 10/11/22





 18:59 06:59 18:59


 


Intake Total   360


 


Balance   360


 


Weight 77.564 kg  


 


Intake:   


 


  Oral   360


 


Other:   


 


  Voiding Method  Toilet 


 


  # Voids  2 














- Constitutional


General appearance: Present: cooperative, no acute distress, obese





- EENT


Eyes: Present: anicteric sclerae, EOMI


ENT: Present: hearing grossly normal





- Respiratory


Respiratory: bilateral: CTA





- Peripheral edema


  ** leg


Peripheral Edema: bilateral: None





- Integumentary


Integumentary: Present: pale





- Neurologic


Neurologic: Present: CNII-XII intact





- Musculoskeletal


Musculoskeletal: Present: strength equal bilaterally





- Psychiatric


Psychiatric: Present: A&O x's 3





- Labs


CBC & Chem 7: 


                                 10/10/22 06:11





                                 10/10/22 06:11





Assessment and Plan


(1) COPD exacerbation


Status: Acute   Priority: High   Code(s): J44.1 - CHRONIC OBSTRUCTIVE PULMONARY 

DISEASE W (ACUTE) EXACERBATION   SNOMED Code(s): 478997201


   





(2) Back pain


Status: Chronic   Priority: Medium   Code(s): M54.9 - DORSALGIA, UNSPECIFIED   

SNOMED Code(s): 812285829


   





(3) Small cell lung cancer


Status: Chronic   Priority: Medium   Code(s): C34.90 - MALIGNANT NEOPLASM OF 

UNSP PART OF UNSP BRONCHUS OR LUNG   SNOMED Code(s): 421775048


   


Plan: 





Patient gets his treatment at [facility], he has appt for the same.  He will 

continue on schedule.  He has a follow-up appointment with his Primary 

Oncologist already scheduled.





Patient's pain scores have not changed despite multiple attempts and changes in 

pain medications.  Did have farm D convert IV Dilaudid to oral morphine.  Anisa dubose has a follow-up with Dr. wagner in 2 weeks, prescriptions were given for 

narcotics for 14 days.  Prescriptions were sent via [facility] to [facility] so 

pt was certain to have them on DC.  Case was discussed with Internal Medicine NP

so that they knew what medications were ordered, in what strength and quantity.


Patient also instructed to bring all of his medications to his appointment with 

 for review Since numerous medications he is not utilizing, some 

medications, especially long-acting narcotics, have been adjusted or 

discontinued.





Greater than 30 minutes spent counseling and coordinating care

## 2022-10-11 NOTE — P.DS
Providers


Date of admission: 


10/03/22 12:44





Expected date of discharge: 10/11/22


Attending physician: 


Brenden Ortiz MD





Consults: 





                                        





10/01/22 19:11


Consult Physician Urgent 


   Consulting Provider: Morales Freire


   Consult Reason/Comments: Metastatic disease


   Do you want consulting provider notified?: Yes





10/02/22 23:50


Consult Physician Routine 


   Consulting Provider: Rolando Carr


   Consult Reason/Comments: palliative RT to paraspinal masses


   Do you want consulting provider notified?: Yes





10/04/22 09:31


Consult Physician Routine 


   Consulting Provider: Mina Mcbride


   Consult Reason/Comments: abd pain, imaging negative. ? endoscopic eval


   Do you want consulting provider notified?: Yes





10/05/22 08:45


Consult Physician Routine 


   Consulting Provider: Sophy Ingram


   Consult Reason/Comments: anemia, tachycardia, SOB,


   Do you want consulting provider notified?: Yes











Primary care physician: 


Rosa Angel





[facility] Course: 


Final Diagnoses:


Abdominal pain  radiating around to bilateral flanks, lower back and between 

scapulas due to metastatic lung disease in a patient with history of primary 

small cell malignant neoplasm of lung, stage IV.


Left pulmonary hilum lung mass with multiple mets in the lung increased in size 

compared to previous exam


Adrenal mass likely secondary to metastatic lesion.


Shortness of breath secondary to lung mass


COPD


GERD


Anxiety/depression


Previous history of smoking


Adrenal insufficiency currently on Cortef at home.














Hospital course:This is a 62-year-old gentleman admitted with abdominal pain 

radiating around to bilateral flanks, lower back and between scapulas due to 

metastatic lung disease in a patient with history of primary small cell 

malignant neoplasm of lung, stage IV.  He reports extensive workup of abdominal 

pain and per oncology attributing it to chemo effect.  Good diet intake with no 

nausea or vomiting- states pain is unaffected by diet intake, ongoing sharp, 

continuous.  Reports Last chemo completed approximately 2 weeks ago and had not 

yet met with radiation oncology outpatient.  Reports current pain management at 

home not working.  Pain rated at 9-10. Pain management team as well as radiation

oncology consult in place.  Afebrile, normal WBC.  Potassium 3.3, replacement 

protocol ordered.  Hemoglobin decreased to 8.








10/04/2022 evaluated by oncology radiation and pain management team with 

recommendations noted and appreciated.  Scheduled for radiation this morning.  

Sitting up at bedside, eating breakfast.  Denies nausea vomiting or diarrhea.  

Reports normal bowel movements, nonbloody, not black or darkened. Reports Dr. Freire recommending outpatient scope, but having difficulties with transportation 

secondary to being in [facility].  Currently pain better controlled.  

Hemoglobin 7.8, platelets decreased A, potassium 3.3, replaced.  Denies chest 

pain, palpitations or shortness of breath.





Completing his radiation series today.  Cleared by oncology for discharge.  

Oncology working with [facility] pharmacy for discharge pain medication 

regimen of morphine sulfate IR and increased fentanyl patch; patient will have 

enough pain medications to last him until his visit with Dr. freire.  Patient has 

been instructed to bring all of his medications into the office visit with 

oncology, for further recommendations. Patient will be discharged home today in 

a stable condition with guarded prognosis.

















The impression and plan of care has been dictated as directed.





:


I performed a history and examination of this patient,  discussed the same with 

the dictator.  I agree with the dictator's note ,documented as a scribe.  Any 

additional findings or plans will be noted.


Patient Condition at Discharge: Stable





Plan - Discharge Summary


Discharge Rx Participant: No


New Discharge Prescriptions: 


New


   Morphine Sulfate Ir [MSIR] 30 mg PO Q4HR PRN 3 Days #18 tab


     PRN Reason: Pain


   Metoprolol Tartrate [Lopressor] 12.5 mg PO BID #60 tab


   Sennosides-Docusate Sodium [Senokot-S] 2 each PO BID  tab





Continue


   Sucralfate [Carafate] 1 gm PO ACHS


   Pantoprazole Sodium [Protonix] 40 mg PO BID


   OXcarbazepine [Trileptal] 300 mg PO BID


   Atorvastatin [Lipitor] 20 mg PO DAILY


   traZODone HCL [Desyrel] 100 mg PO HS


   metFORMIN  mg PO BID


   Budesonide [Pulmicort] 0.25 mg INHALATION RT-BID


   Fluconazole [Diflucan] 100 mg PO DAILY PRN


     PRN Reason: THRUSH


   Albuterol Nebulized [Ventolin Nebulized] 2.5 mg INHALATION RT-QID PRN


     PRN Reason: Shortness Of Breath


   Gabapentin [Neurontin] 100 mg PO BID 30 Days #60 cap


   Hydrocortisone [Cortef] 20 mg PO DAILY@0800


   polyethylene glycoL 3350 [Miralax] 17 gm PO DAILY PRN


     PRN Reason: Constipation


   Ipratropium-Albuterol Nebulize [Duoneb 0.5 mg-3 mg/3 ml Soln] 3 ml INHALATION

RT-QID PRN


     PRN Reason: Shortness Of Breath


   ALPRAZolam [Xanax] 0.5 mg PO BID PRN


     PRN Reason: Anxiety


   Hydrocortisone [Cortef] 10 mg PO DAILY@1400


   Insulin Aspart [NovoLOG Flexpen] See Protocol SQ ACHS


   Metoclopramide [Reglan] 5 mg PO AC-BID PRN


     PRN Reason: Nausea


   Famotidine 40 mg PO DAILY


   Semaglutide [Rybelsus] 3 mg PO DAILY


   Dicyclomine [Bentyl] 10 mg PO TID PRN 10 Days #30 capsule


     PRN Reason: Pain





Discontinued


   Morphine Sulfate ER [Ms Contin] 30 mg PO Q12HR PRN


     PRN Reason: Pain


   fentaNYL 25MCG/HR PATCH [Duragesic 25MCG/HR] 1 patch TRANSDERM Q72H


Discharge Medication List





Atorvastatin [Lipitor] 20 mg PO DAILY 06/25/21 [History]


OXcarbazepine [Trileptal] 300 mg PO BID 06/25/21 [History]


Pantoprazole Sodium [Protonix] 40 mg PO BID 06/25/21 [History]


Sucralfate [Carafate] 1 gm PO ACHS 06/25/21 [History]


traZODone HCL [Desyrel] 100 mg PO HS 06/25/21 [History]


ALPRAZolam [Xanax] 0.5 mg PO BID PRN 04/29/22 [History]


Budesonide [Pulmicort] 0.25 mg INHALATION RT-BID 04/29/22 [History]


Hydrocortisone [Cortef] 10 mg PO DAILY@1400 04/29/22 [History]


Ipratropium-Albuterol Nebulize [Duoneb 0.5 mg-3 mg/3 ml Soln] 3 ml INHALATION 

RT-QID PRN 04/29/22 [History]


metFORMIN  mg PO BID 04/29/22 [History]


Albuterol Nebulized [Ventolin Nebulized] 2.5 mg INHALATION RT-QID PRN 06/09/22 

[History]


Fluconazole [Diflucan] 100 mg PO DAILY PRN 06/09/22 [History]


Insulin Aspart [NovoLOG Flexpen] See Protocol SQ ACHS 06/10/22 [History]


Dicyclomine [Bentyl] 10 mg PO TID PRN 10 Days #30 capsule 07/27/22 [Rx]


Famotidine 40 mg PO DAILY 07/27/22 [History]


Metoclopramide [Reglan] 5 mg PO AC-BID PRN 07/27/22 [History]


Semaglutide [Rybelsus] 3 mg PO DAILY 07/27/22 [History]


Gabapentin [Neurontin] 100 mg PO BID 30 Days #60 cap 09/29/22 [Rx]


Hydrocortisone [Cortef] 20 mg PO DAILY@0800 10/01/22 [History]


polyethylene glycoL 3350 [Miralax] 17 gm PO DAILY PRN 10/01/22 [History]


Metoprolol Tartrate [Lopressor] 12.5 mg PO BID #60 tab 10/11/22 [Rx]


Morphine Sulfate Ir [MSIR] 30 mg PO Q4HR PRN 3 Days #18 tab 10/11/22 [Rx]


Sennosides-Docusate Sodium [Senokot-S] 2 each PO BID  tab 10/11/22 [Rx]








Follow up Appointment(s)/Referral(s): 


Morales Freire MD [STAFF PHYSICIAN] - 10/26/22 1:30 pm (This appt is at the office [facility] on [address] behind [facility])


Rolando Carr MD [STAFF PHYSICIAN] - 11/09/22 1:00 pm (Follow-Up appt is 

November 9, 2022 at 1pm with Dr Carr 1st floor Radiation/Oncology)


None,Stated [REFERRING] - 1-2 days


Patient Instructions/Handouts:  COPD (Chronic Obstructive Pulmonary Disease) 

(DC)


Activity/Diet/Wound Care/Special Instructions: 


Pain management has been arranged as per oncology with fentanyl patch and MS IR 

with enough to last patient until his follow-up visit with Dr. freire.

## 2022-10-11 NOTE — P.PN
Subjective


Progress Note Date: 10/11/22


This is a 62-year-old gentleman admitted with abdominal pain radiating around to

bilateral flanks, lower back and between scapulas due to metastatic lung disease

in a patient with history of primary small cell malignant neoplasm of lung, 

stage IV.  He reports extensive workup of abdominal pain and per oncology 

attributing it to chemo effect.  Good diet intake with no nausea or vomiting- 

states pain is unaffected by diet intake, ongoing sharp, continuous.  Reports 

Last chemo completed approximately 2 weeks ago and had not yet met with 

radiation oncology outpatient.  Reports current pain management at home not 

working.  Pain rated at 9-10. Pain management team as well as radiation oncology

consult in place.  Afebrile, normal WBC.  Potassium 3.3, replacement protocol 

ordered.  Hemoglobin decreased to 8.








10/04/2022 evaluated by oncology radiation and pain management team with 

recommendations noted and appreciated.  Scheduled for radiation this morning.  

Sitting up at bedside, eating breakfast.  Denies nausea vomiting or diarrhea.  

Reports normal bowel movements, nonbloody, not black or darkened. Reports Dr. Freire recommending outpatient scope, but having difficulties with transportation 

secondary to being in [facility].  Currently pain better controlled.  

Hemoglobin 7.8, platelets decreased A, potassium 3.3, replaced.  Denies chest 

pain, palpitations or shortness of breath.











10/11/2022 continues on his radiation treatments.  States despite  pain 

medication regimens he continues to have sharp, stabbing stomach and back pain 

without relief.  Further pain management adjustments as per oncology, including 

discontinuation of Dilaudid IV push.





Objective





- Vital Signs


Vital signs: 


                                   Vital Signs











Temp  97.6 F   10/11/22 08:00


 


Pulse  104 H  10/11/22 08:00


 


Resp  16   10/11/22 08:00


 


BP  124/56   10/11/22 08:00


 


Pulse Ox  93 L  10/11/22 08:00


 


FiO2  21   10/10/22 19:44








                                 Intake & Output











 10/10/22 10/11/22 10/11/22





 18:59 06:59 18:59


 


Intake Total   360


 


Balance   360


 


Weight 77.564 kg  


 


Intake:   


 


  Oral   360


 


Other:   


 


  Voiding Method  Toilet 


 


  # Voids  2 














- Exam


PHYSICAL EXAM:


VITAL SIGNS: [As above]


GENERAL: Alert and oriented 3, Sitting up at side of bed, no acute distress


HEENT:  Conjunctivae normal. eyes normal. MMM.


NECK: Supple, No JVD.


CARDIOVASCULAR:  S1, S2 regular, mild tachycardia. No murmur.


RESPIRATION: Breath sounds diminished in the bases. No rhonchi or crackles. 


ABDOMEN:  Soft, nontender, No guarding.Bowel sounds heard.


LEGS:  No edema. no swelling.


NERVOUS SYSTEM:  Cranial N 2-12 grossly normal.No focal deficits. Strength and 

sensation grossly intact.


Skin: Warm and dry, no rash 














- Labs


CBC & Chem 7: 


                                 10/10/22 06:11





                                 10/10/22 06:11





Assessment and Plan


Assessment: 





Abdominal pain  radiating around to bilateral flanks, lower back and between 

scapulas due to metastatic lung disease in a patient with history of primary 

small cell malignant neoplasm of lung, stage IV.


Left pulmonary hilum lung mass with multiple mets in the lung increased in size 

compared to previous exam


Adrenal mass likely secondary to metastatic lesion.


Shortness of breath secondary to lung mass


COPD


GERD


Anxiety/depression


Previous history of smoking


Adrenal insufficiency currently on Cortef at home.





Plan: Continue on current medication regime ,monitoring and symptomatic 

treatment.  Complete Radiation.  Pain management as per oncology.  Discharge 

planning in progress soon. Prognosis guarded given multiple complex medical 

issues.














The impression and plan of care has been dictated as directed.





:


I performed a history and examination of this patient,  discussed the same with 

the dictator.  I agree with the dictator's note ,documented as a scribe.  Any 

additional findings or plans will be noted.

## 2022-10-11 NOTE — P.PN
Subjective


Progress Note Date: 10/11/22





CHIEF COMPLAINT: Abdominal pain





HISTORY OF PRESENT ILLNESS: This is a 62-year-old male with a known history of 

metastatic lung cancer.  Patient currently undergoing radiation treatment.  

Patient is status post EGD and colonoscopy that shows gastritis, right colon 

lipoma and diverticulosis.  Patient does complain of the same abdominal pain.  

He reports that his pain medication has been adjusted by oncology.  He feels his

pain is better controlled.  He is tolerating diet.  Denies any nausea vomiting. 

He is having bowel movements.  He is scheduled for radiation treatment today.  

Patient thinks he might be discharged today.





Patient seen and examined with Dr. avendano





PHYSICAL EXAM: 


VITAL SIGNS: Reviewed.


GENERAL: Well-developed in no acute distress. 


HEENT:  No sclera icterus. Extraocular movements grossly intact.  Moist buccal 

mucosa. Head is atraumatic, normocephalic. 


ABDOMEN:  Soft.  Nondistended. 


NEUROLOGIC: Alert and oriented. Cranial nerves II through XII grossly intact.





ASSESSMENT: 


1.  Abdominal pain status post EGD and colonoscopy


2.  Lung cancer with metastatic disease








PLAN: 


-Continue regular diet


-Continue PPI for gastritis


-Continue supportive care


-Patient can be discharged and surgical service when medically cleared





Physician Assistant note has been reviewed by physician. Signing provider agrees

with the documented findings, assessment, and plan of care. 





Objective





- Vital Signs


Vital signs: 


                                   Vital Signs











Temp  98.1 F   10/11/22 12:24


 


Pulse  112 H  10/11/22 12:24


 


Resp  16   10/11/22 12:24


 


BP  122/63   10/11/22 12:24


 


Pulse Ox  93 L  10/11/22 12:24


 


FiO2  21   10/10/22 19:44








                                 Intake & Output











 10/10/22 10/11/22 10/11/22





 18:59 06:59 18:59


 


Intake Total   360


 


Balance   360


 


Weight 77.564 kg  


 


Intake:   


 


  Oral   360


 


Other:   


 


  Voiding Method  Toilet 


 


  # Voids  2 














- Labs


CBC & Chem 7: 


                                 10/10/22 06:11





                                 10/10/22 06:11

## 2022-11-02 ENCOUNTER — HOSPITAL ENCOUNTER (INPATIENT)
Dept: HOSPITAL 47 - EC | Age: 62
LOS: 9 days | Discharge: ANOTHER HOSPITAL | DRG: 377 | End: 2022-11-11
Attending: FAMILY MEDICINE | Admitting: FAMILY MEDICINE
Payer: MEDICARE

## 2022-11-02 VITALS — BODY MASS INDEX: 31.4 KG/M2

## 2022-11-02 DIAGNOSIS — F41.9: ICD-10-CM

## 2022-11-02 DIAGNOSIS — K21.9: ICD-10-CM

## 2022-11-02 DIAGNOSIS — F32.A: ICD-10-CM

## 2022-11-02 DIAGNOSIS — D61.810: ICD-10-CM

## 2022-11-02 DIAGNOSIS — M19.90: ICD-10-CM

## 2022-11-02 DIAGNOSIS — K92.1: Primary | ICD-10-CM

## 2022-11-02 DIAGNOSIS — Z92.3: ICD-10-CM

## 2022-11-02 DIAGNOSIS — C34.12: ICD-10-CM

## 2022-11-02 DIAGNOSIS — E87.70: ICD-10-CM

## 2022-11-02 DIAGNOSIS — E27.40: ICD-10-CM

## 2022-11-02 DIAGNOSIS — G89.3: ICD-10-CM

## 2022-11-02 DIAGNOSIS — C79.51: ICD-10-CM

## 2022-11-02 DIAGNOSIS — Z79.899: ICD-10-CM

## 2022-11-02 DIAGNOSIS — Z71.3: ICD-10-CM

## 2022-11-02 DIAGNOSIS — D62: ICD-10-CM

## 2022-11-02 DIAGNOSIS — J95.84: ICD-10-CM

## 2022-11-02 DIAGNOSIS — E78.5: ICD-10-CM

## 2022-11-02 DIAGNOSIS — Z87.891: ICD-10-CM

## 2022-11-02 DIAGNOSIS — J43.9: ICD-10-CM

## 2022-11-02 DIAGNOSIS — E11.9: ICD-10-CM

## 2022-11-02 DIAGNOSIS — Z79.84: ICD-10-CM

## 2022-11-02 DIAGNOSIS — I10: ICD-10-CM

## 2022-11-02 DIAGNOSIS — C78.89: ICD-10-CM

## 2022-11-02 DIAGNOSIS — J90: ICD-10-CM

## 2022-11-02 DIAGNOSIS — Z79.51: ICD-10-CM

## 2022-11-02 DIAGNOSIS — T45.1X5A: ICD-10-CM

## 2022-11-02 DIAGNOSIS — Z96.643: ICD-10-CM

## 2022-11-02 DIAGNOSIS — K29.50: ICD-10-CM

## 2022-11-02 DIAGNOSIS — E87.6: ICD-10-CM

## 2022-11-02 DIAGNOSIS — Z20.822: ICD-10-CM

## 2022-11-02 DIAGNOSIS — D17.9: ICD-10-CM

## 2022-11-02 DIAGNOSIS — C79.70: ICD-10-CM

## 2022-11-02 LAB
ALBUMIN SERPL-MCNC: 2.8 G/DL (ref 3.5–5)
ALP SERPL-CCNC: 60 U/L (ref 38–126)
ALT SERPL-CCNC: 22 U/L (ref 4–49)
ANION GAP SERPL CALC-SCNC: 11 MMOL/L
APTT BLD: 24.1 SEC (ref 22–30)
AST SERPL-CCNC: 23 U/L (ref 17–59)
BUN SERPL-SCNC: 12 MG/DL (ref 9–20)
CALCIUM SPEC-MCNC: 7.5 MG/DL (ref 8.4–10.2)
CHLORIDE SERPL-SCNC: 106 MMOL/L (ref 98–107)
CO2 SERPL-SCNC: 19 MMOL/L (ref 22–30)
ERYTHROCYTE [DISTWIDTH] IN BLOOD BY AUTOMATED COUNT: 1.78 M/UL (ref 4.3–5.9)
ERYTHROCYTE [DISTWIDTH] IN BLOOD: 15.1 % (ref 11.5–15.5)
GLUCOSE BLD-MCNC: 418 MG/DL (ref 70–110)
GLUCOSE SERPL-MCNC: 115 MG/DL (ref 74–99)
HCT VFR BLD AUTO: 18.9 % (ref 39–53)
HGB BLD-MCNC: 6.5 GM/DL (ref 13–17.5)
INR PPP: 1 (ref ?–1.2)
MAGNESIUM SPEC-SCNC: 2 MG/DL (ref 1.6–2.3)
MCH RBC QN AUTO: 36.8 PG (ref 25–35)
MCHC RBC AUTO-ENTMCNC: 34.7 G/DL (ref 31–37)
MCV RBC AUTO: 106.2 FL (ref 80–100)
PLATELET # BLD AUTO: 5 K/UL (ref 150–450)
POTASSIUM SERPL-SCNC: 4.4 MMOL/L (ref 3.5–5.1)
PROT SERPL-MCNC: 5.5 G/DL (ref 6.3–8.2)
PT BLD: 10.5 SEC (ref 9–12)
SODIUM SERPL-SCNC: 136 MMOL/L (ref 137–145)
WBC # BLD AUTO: 0.2 K/UL (ref 3.8–10.6)

## 2022-11-02 PROCEDURE — 36415 COLL VENOUS BLD VENIPUNCTURE: CPT

## 2022-11-02 PROCEDURE — 86850 RBC ANTIBODY SCREEN: CPT

## 2022-11-02 PROCEDURE — 85379 FIBRIN DEGRADATION QUANT: CPT

## 2022-11-02 PROCEDURE — 99285 EMERGENCY DEPT VISIT HI MDM: CPT

## 2022-11-02 PROCEDURE — 85384 FIBRINOGEN ACTIVITY: CPT

## 2022-11-02 PROCEDURE — 87205 SMEAR GRAM STAIN: CPT

## 2022-11-02 PROCEDURE — 83605 ASSAY OF LACTIC ACID: CPT

## 2022-11-02 PROCEDURE — 87070 CULTURE OTHR SPECIMN AEROBIC: CPT

## 2022-11-02 PROCEDURE — 86900 BLOOD TYPING SEROLOGIC ABO: CPT

## 2022-11-02 PROCEDURE — 96374 THER/PROPH/DIAG INJ IV PUSH: CPT

## 2022-11-02 PROCEDURE — 85025 COMPLETE CBC W/AUTO DIFF WBC: CPT

## 2022-11-02 PROCEDURE — 87040 BLOOD CULTURE FOR BACTERIA: CPT

## 2022-11-02 PROCEDURE — 93005 ELECTROCARDIOGRAM TRACING: CPT

## 2022-11-02 PROCEDURE — 78278 ACUTE GI BLOOD LOSS IMAGING: CPT

## 2022-11-02 PROCEDURE — 85027 COMPLETE CBC AUTOMATED: CPT

## 2022-11-02 PROCEDURE — 81003 URINALYSIS AUTO W/O SCOPE: CPT

## 2022-11-02 PROCEDURE — 94760 N-INVAS EAR/PLS OXIMETRY 1: CPT

## 2022-11-02 PROCEDURE — 86901 BLOOD TYPING SEROLOGIC RH(D): CPT

## 2022-11-02 PROCEDURE — 30233R1 TRANSFUSION OF NONAUTOLOGOUS PLATELETS INTO PERIPHERAL VEIN, PERCUTANEOUS APPROACH: ICD-10-PCS

## 2022-11-02 PROCEDURE — 87186 SC STD MICRODIL/AGAR DIL: CPT

## 2022-11-02 PROCEDURE — 80048 BASIC METABOLIC PNL TOTAL CA: CPT

## 2022-11-02 PROCEDURE — 87635 SARS-COV-2 COVID-19 AMP PRB: CPT

## 2022-11-02 PROCEDURE — 83735 ASSAY OF MAGNESIUM: CPT

## 2022-11-02 PROCEDURE — 71045 X-RAY EXAM CHEST 1 VIEW: CPT

## 2022-11-02 PROCEDURE — 71275 CT ANGIOGRAPHY CHEST: CPT

## 2022-11-02 PROCEDURE — 71046 X-RAY EXAM CHEST 2 VIEWS: CPT

## 2022-11-02 PROCEDURE — 36573 INSJ PICC RS&I 5 YR+: CPT

## 2022-11-02 PROCEDURE — 84145 PROCALCITONIN (PCT): CPT

## 2022-11-02 PROCEDURE — 30233N1 TRANSFUSION OF NONAUTOLOGOUS RED BLOOD CELLS INTO PERIPHERAL VEIN, PERCUTANEOUS APPROACH: ICD-10-PCS

## 2022-11-02 PROCEDURE — 84132 ASSAY OF SERUM POTASSIUM: CPT

## 2022-11-02 PROCEDURE — 96375 TX/PRO/DX INJ NEW DRUG ADDON: CPT

## 2022-11-02 PROCEDURE — 85730 THROMBOPLASTIN TIME PARTIAL: CPT

## 2022-11-02 PROCEDURE — 87077 CULTURE AEROBIC IDENTIFY: CPT

## 2022-11-02 PROCEDURE — 83880 ASSAY OF NATRIURETIC PEPTIDE: CPT

## 2022-11-02 PROCEDURE — 85610 PROTHROMBIN TIME: CPT

## 2022-11-02 PROCEDURE — 82533 TOTAL CORTISOL: CPT

## 2022-11-02 PROCEDURE — 80053 COMPREHEN METABOLIC PANEL: CPT

## 2022-11-02 PROCEDURE — 86920 COMPATIBILITY TEST SPIN: CPT

## 2022-11-02 PROCEDURE — 94640 AIRWAY INHALATION TREATMENT: CPT

## 2022-11-02 PROCEDURE — 84484 ASSAY OF TROPONIN QUANT: CPT

## 2022-11-02 NOTE — ED
SOB HPI





- General


Chief Complaint: Shortness of Breath


Stated Complaint: JARRED


Time Seen by Provider: 11/02/22 19:42


Source: patient, RN notes reviewed, old records reviewed


Mode of arrival: EMS


Limitations: no limitations





- History of Present Illness


Initial Comments: 





62-year-old male presents to the emergency room with complaints of shortness of 

breath for 2 days and 3 episodes of bloody stool today.  Shortness of breath 

worse with exertion. He denies any chest pain.  Patient does have a history of 

lung cancer and is currently undergoing chemotherapy.  Last chemotherapy was 10 

days ago at [facility].  Patient states he did have metastasis to his stomach and 

neck, however those tumors have shrunk with chemotherapy.  Patient does have a 

history of COPD, no home oxygen. 


MD Complaint: shortness of breath


-: days(s) (2)


Severity scale (1-10): 0


Consistency: constant


Improves With: rest


Worsens With: exertion


Known History Of: COPD, other (Lung cancer with metastases)


Associated Symptoms: other (GI bleed 3 episodes today)


Treatments Prior to Arrival: other (Albuterol steroids by EMS)





- Related Data


Home Oxygen Therapy: No


                                Home Medications











 Medication  Instructions  Recorded  Confirmed


 


Atorvastatin [Lipitor] 20 mg PO DAILY 06/25/21 11/02/22


 


OXcarbazepine [Trileptal] 300 mg PO BID 06/25/21 11/02/22


 


Pantoprazole Sodium [Protonix] 40 mg PO BID 06/25/21 11/02/22


 


Sucralfate [Carafate] 1 gm PO ACHS 06/25/21 11/02/22


 


traZODone HCL [Desyrel] 100 mg PO HS 06/25/21 11/02/22


 


ALPRAZolam [Xanax] 0.5 mg PO BID PRN 04/29/22 11/02/22


 


Budesonide [Pulmicort] 0.25 mg INHALATION RT-BID 04/29/22 11/02/22


 


Hydrocortisone [Cortef] 10 mg PO DAILY@1400 04/29/22 11/02/22


 


Ipratropium-Albuterol Nebulize 3 ml INHALATION RT-QID PRN 04/29/22 11/02/22





[Duoneb 0.5 mg-3 mg/3 ml Soln]   


 


metFORMIN  mg PO BID 04/29/22 11/02/22


 


Albuterol Nebulized [Ventolin 2.5 mg INHALATION RT-QID PRN 06/09/22 11/02/22





Nebulized]   


 


Fluconazole [Diflucan] 100 mg PO DAILY PRN 06/09/22 11/02/22


 


Insulin Aspart [NovoLOG Flexpen] See Protocol SQ ACHS 06/10/22 11/02/22


 


Famotidine 40 mg PO DAILY 07/27/22 11/02/22


 


Metoclopramide [Reglan] 5 mg PO AC-BID PRN 07/27/22 11/02/22


 


Semaglutide [Rybelsus] 3 mg PO DAILY 07/27/22 11/02/22


 


Hydrocortisone [Cortef] 20 mg PO DAILY@0800 10/01/22 11/02/22


 


polyethylene glycoL 3350 [Miralax] 17 gm PO DAILY PRN 10/01/22 11/02/22


 


Gabapentin [Neurontin] 400 mg PO BID 11/02/22 11/02/22


 


Metoprolol Tartrate [Lopressor] 12.5 mg PO BID 11/02/22 11/02/22


 


Ondansetron [Zofran] 4 mg PO Q4H PRN 11/02/22 11/02/22


 


Sennosides-Docusate Sodium 2 tab PO BID PRN 11/02/22 11/02/22





[Senokot-S]   


 


fentaNYL 50MCG/HR PATCH [Duragesic 1 patch TRANSDERM Q72H 11/02/22 11/02/22





50MCG/HR]   


 


oxyCODONE-APAP 10-325MG [Percocet 1 tab PO Q3H PRN 11/02/22 11/02/22





 mg]   








                                  Previous Rx's











 Medication  Instructions  Recorded


 


Dicyclomine [Bentyl] 10 mg PO TID PRN 10 Days #30 07/27/22





 capsule 


 


Morphine Sulfate Ir [MSIR] 30 mg PO Q4HR PRN 3 Days #18 tab 10/11/22











                                    Allergies











Allergy/AdvReac Type Severity Reaction Status Date / Time


 


No Known Allergies Allergy   Verified 11/02/22 22:49














Review of Systems


ROS Statement: 


Those systems with pertinent positive or pertinent negative responses have been 

documented in the HPI.





ROS Other: All systems not noted in ROS Statement are negative.





Past Medical History


Past Medical History: Cancer, COPD, GERD/Reflux


Additional Past Medical History / Comment(s): LUNG CANCER WITH TUMOR IN STOMACH 

AND STATES ALSO IN HIS NECK.,.  RECEIVING CHEMOTHERAPY., DDD WITH BACK ,HIP & 

LEG PAIN., (PAIN CLINIC PATIENT)., EMPHYSEMA., GASTRITIS.


History of Any Multi-Drug Resistant Organisms: None Reported


Past Surgical History: Joint Replacement, Orthopedic Surgery


Additional Past Surgical History / Comment(s): RIGHT AND LEFT TOTAL HIPS,  RIGHT

 SHOULDER SURGERY.


Past Anesthesia/Blood Transfusion Reactions: No Reported Reaction


Smoking Status: Former smoker





- Past Family History


  ** Father


Family Medical History: No Reported History





General Exam


Limitations: no limitations


General appearance: alert, in no apparent distress


Head exam: Present: atraumatic, normocephalic


Eye exam: Present: other (Pale conjunctiva).  Absent: scleral icterus, 

conjunctival injection, periorbital swelling, periorbital tenderness


ENT exam: Present: mucous membranes moist


Neck exam: Absent: tenderness, meningismus


Respiratory exam: Present: normal lung sounds bilaterally.  Absent: respiratory 

distress, wheezes, rales, rhonchi, stridor, chest wall tenderness, accessory 

muscle use


Cardiovascular Exam: Present: tachycardia


GI/Abdominal exam: Present: soft.  Absent: distended, tenderness, guarding, 

rebound, rigid


Extremities exam: Absent: pedal edema


  ** Left


Forearm Wrist exam: Present: other (Petechiae)


Back exam: Present: full ROM.  Absent: tenderness, CVA tenderness (R), CVA 

tenderness (L), rash noted


Neurological exam: Present: alert, oriented X3


Psychiatric exam: Present: normal affect, normal mood


Skin exam: Present: warm, dry, pallor.  Absent: cyanosis, diaphoretic





Course


                                   Vital Signs











  11/02/22 11/02/22 11/02/22





  19:28 19:36 20:40


 


Temperature 98.5 F  


 


Pulse Rate 111 H  105 H


 


Respiratory 26 H  20





Rate   


 


Blood Pressure 124/85  96/56


 


O2 Sat by Pulse 100 100 100





Oximetry   














  11/02/22 11/02/22 11/02/22





  21:00 22:00 22:12


 


Temperature  97.8 F 98 F


 


Pulse Rate 100 102 H 100


 


Respiratory 20 20 20





Rate   


 


Blood Pressure 140/66 110/60 124/70


 


O2 Sat by Pulse 99 98 99





Oximetry   














  11/02/22 11/02/22 11/02/22





  22:19 22:31 23:00


 


Temperature  98 F 98 F


 


Pulse Rate 100 100 100


 


Respiratory 20 18 18





Rate   


 


Blood Pressure 136/68 107/56 132/66


 


O2 Sat by Pulse 99 100 100





Oximetry   














  11/02/22 11/02/22 11/02/22





  23:03 23:23 23:30


 


Temperature 98 F 98 F 


 


Pulse Rate 100 100 100


 


Respiratory 20 20 20





Rate   


 


Blood Pressure 105/65 105/66 110/58


 


O2 Sat by Pulse 99 100 100





Oximetry   














- Reevaluation(s)


Reevaluation #1: 





11/02/22 22:13


Case discussed with Dr. Suresh who recommended CT angio of chest due to 

elevated d-dimer despite elevated d-dimer history related to patient complaints 

of shortness of breath and history of lung cancer. 


Time: 22:13





Medical Decision Making





- Medical Decision Making


Patient presents today shortness of breath worse with exertion.  Denies any 

chest pain.  No nausea or vomiting.  Has had multiple episodes of bloody stool. 

 Patient is currently undergoing chemotherapy for lung cancer with metastasis.  

Last chemotherapy 10 days ago.


EKG interpreted by me shows sinus tachycardia without ectopy, normal axis no ST 

elevation.


Chest x-ray shows scattered opacities which may represent pneumonia. Left upper 

lobe and left perihilar mass as seen on prior CT, known history of left lung 

cancer, on chemo.


Due to neutropenia will be given antibiotics.





Labs show neutropenia of 0.2.  Hemoglobin 6.5, hematocrit of 18.9, platelets 5. 

  On October 10, 2022 hemoglobin was 7.7 and platelets 87. Patient was given 1 

unit of platelets and one unit of packed RBCs.





D-dimer elevated at 1.76.  Case discussed with Dr. Suresh recommended CTA chest

 to rule out PE.


CT shows no evidence of pulmonary embolism.


Vital signs are stable.








Patient will be admitted to the hospital.  He is a full code.  He is agreeable 

to this plan of care.  











- Lab Data


Result diagrams: 


                                 11/02/22 20:32





                                 11/02/22 20:32


                                   Lab Results











  11/02/22 11/02/22 11/02/22 Range/Units





  20:25 20:32 20:32 


 


WBC   0.2 L*   (3.8-10.6)  k/uL


 


RBC   1.78 L   (4.30-5.90)  m/uL


 


Hgb   6.5 L* D   (13.0-17.5)  gm/dL


 


Hct   18.9 L*   (39.0-53.0)  %


 


MCV   106.2 H D   (80.0-100.0)  fL


 


MCH   36.8 H   (25.0-35.0)  pg


 


MCHC   34.7   (31.0-37.0)  g/dL


 


RDW   15.1   (11.5-15.5)  %


 


Plt Count   5 L* D   (150-450)  k/uL


 


MPV   12.4   


 


Lymphocytes %      %


 


Neutrophils #   NP   


 


Hypochromasia   Marked   


 


Poikilocytosis   Moderate   


 


Macrocytosis   Moderate   


 


PT    10.5  (9.0-12.0)  sec


 


INR    1.0  (<1.2)  


 


APTT    24.1  (22.0-30.0)  sec


 


D-Dimer    1.76 H  (<0.60)  mg/L FEU


 


Sodium     (137-145)  mmol/L


 


Potassium     (3.5-5.1)  mmol/L


 


Chloride     ()  mmol/L


 


Carbon Dioxide     (22-30)  mmol/L


 


Anion Gap     mmol/L


 


BUN     (9-20)  mg/dL


 


Creatinine     (0.66-1.25)  mg/dL


 


Est GFR (CKD-EPI)AfAm     (>60 ml/min/1.73 sqM)  


 


Est GFR (CKD-EPI)NonAf     (>60 ml/min/1.73 sqM)  


 


Glucose     (74-99)  mg/dL


 


Plasma Lactic Acid Santo     (0.7-2.0)  mmol/L


 


Calcium     (8.4-10.2)  mg/dL


 


Magnesium     (1.6-2.3)  mg/dL


 


Total Bilirubin     (0.2-1.3)  mg/dL


 


AST     (17-59)  U/L


 


ALT     (4-49)  U/L


 


Alkaline Phosphatase     ()  U/L


 


Troponin I     (0.000-0.034)  ng/mL


 


Total Protein     (6.3-8.2)  g/dL


 


Albumin     (3.5-5.0)  g/dL


 


Blood Type  O Positive    


 


Blood Type Recheck  O Pos    


 


Bld Type Recheck Status  No    


 


Antibody Screen  NEGATIVE    


 


Crossmatch  See Detail    


 


Transfuse Platelets     


 


Spec Expiration Date  11/05/2022 - 2325 11/02/22 11/02/22 11/02/22 Range/Units





  20:32 20:32 20:32 


 


WBC     (3.8-10.6)  k/uL


 


RBC     (4.30-5.90)  m/uL


 


Hgb     (13.0-17.5)  gm/dL


 


Hct     (39.0-53.0)  %


 


MCV     (80.0-100.0)  fL


 


MCH     (25.0-35.0)  pg


 


MCHC     (31.0-37.0)  g/dL


 


RDW     (11.5-15.5)  %


 


Plt Count     (150-450)  k/uL


 


MPV     


 


Lymphocytes %     %


 


Neutrophils #     


 


Hypochromasia     


 


Poikilocytosis     


 


Macrocytosis     


 


PT     (9.0-12.0)  sec


 


INR     (<1.2)  


 


APTT     (22.0-30.0)  sec


 


D-Dimer     (<0.60)  mg/L FEU


 


Sodium  136 L    (137-145)  mmol/L


 


Potassium  4.4    (3.5-5.1)  mmol/L


 


Chloride  106    ()  mmol/L


 


Carbon Dioxide  19 L    (22-30)  mmol/L


 


Anion Gap  11    mmol/L


 


BUN  12    (9-20)  mg/dL


 


Creatinine  0.63 L    (0.66-1.25)  mg/dL


 


Est GFR (CKD-EPI)AfAm  >90    (>60 ml/min/1.73 sqM)  


 


Est GFR (CKD-EPI)NonAf  >90    (>60 ml/min/1.73 sqM)  


 


Glucose  115 H    (74-99)  mg/dL


 


Plasma Lactic Acid Santo   1.5   (0.7-2.0)  mmol/L


 


Calcium  7.5 L    (8.4-10.2)  mg/dL


 


Magnesium  2.0    (1.6-2.3)  mg/dL


 


Total Bilirubin  0.4    (0.2-1.3)  mg/dL


 


AST  23    (17-59)  U/L


 


ALT  22    (4-49)  U/L


 


Alkaline Phosphatase  60    ()  U/L


 


Troponin I    0.017  (0.000-0.034)  ng/mL


 


Total Protein  5.5 L    (6.3-8.2)  g/dL


 


Albumin  2.8 L    (3.5-5.0)  g/dL


 


Blood Type     


 


Blood Type Recheck     


 


Bld Type Recheck Status     


 


Antibody Screen     


 


Crossmatch     


 


Transfuse Platelets     


 


Spec Expiration Date     














  11/02/22 Range/Units





  21:30 


 


WBC   (3.8-10.6)  k/uL


 


RBC   (4.30-5.90)  m/uL


 


Hgb   (13.0-17.5)  gm/dL


 


Hct   (39.0-53.0)  %


 


MCV   (80.0-100.0)  fL


 


MCH   (25.0-35.0)  pg


 


MCHC   (31.0-37.0)  g/dL


 


RDW   (11.5-15.5)  %


 


Plt Count   (150-450)  k/uL


 


MPV   


 


Lymphocytes %   %


 


Neutrophils #   


 


Hypochromasia   


 


Poikilocytosis   


 


Macrocytosis   


 


PT   (9.0-12.0)  sec


 


INR   (<1.2)  


 


APTT   (22.0-30.0)  sec


 


D-Dimer   (<0.60)  mg/L FEU


 


Sodium   (137-145)  mmol/L


 


Potassium   (3.5-5.1)  mmol/L


 


Chloride   ()  mmol/L


 


Carbon Dioxide   (22-30)  mmol/L


 


Anion Gap   mmol/L


 


BUN   (9-20)  mg/dL


 


Creatinine   (0.66-1.25)  mg/dL


 


Est GFR (CKD-EPI)AfAm   (>60 ml/min/1.73 sqM)  


 


Est GFR (CKD-EPI)NonAf   (>60 ml/min/1.73 sqM)  


 


Glucose   (74-99)  mg/dL


 


Plasma Lactic Acid Santo   (0.7-2.0)  mmol/L


 


Calcium   (8.4-10.2)  mg/dL


 


Magnesium   (1.6-2.3)  mg/dL


 


Total Bilirubin   (0.2-1.3)  mg/dL


 


AST   (17-59)  U/L


 


ALT   (4-49)  U/L


 


Alkaline Phosphatase   ()  U/L


 


Troponin I   (0.000-0.034)  ng/mL


 


Total Protein   (6.3-8.2)  g/dL


 


Albumin   (3.5-5.0)  g/dL


 


Blood Type   


 


Blood Type Recheck   


 


Bld Type Recheck Status   


 


Antibody Screen   


 


Crossmatch   


 


Transfuse Platelets  [id number]  


 


Spec Expiration Date   














- EKG Data


-: EKG Interpreted by Me


EKG shows normal: sinus rhythm (Ventricular rate 102, WV interval .123, QRS 

0.85, QTC 0.429; normal axis)





Critical Care Time


Critical Care Time: Yes


Total Critical Care Time: 32





Disposition


Clinical Impression: 


 GI bleed, Symptomatic anemia, Thrombocytopenia, Leukopenia due to 

antineoplastic chemotherapy





Disposition: ADMITTED AS IP TO THIS HOSP


Decision Date: 11/02/22


Decision Time: 21:10

## 2022-11-02 NOTE — CT
EXAMINATION TYPE: CT angio chest

 

DATE OF EXAM: 11/2/2022

 

COMPARISON: 10/1/2022

 

HISTORY: Elevated d-dimer, history of lung cancer

 

CT DLP: 409.3 mGycm

Automated exposure control for dose reduction was used.

 

CONTRAST: 

Performed with IV Contrast, patient injected with 65 mL of Isovue 370.

 

Images obtained from the thoracic inlet through the diaphragm with the IV contrast. There are 3-D pos
t processed images.

 

There are some enlarged paratracheal and left bronchial lymph nodes up to 2.5 cm. There is adenopathy
 encasing the left pulmonary artery. There is coarse peripheral patchy reticular infiltrates in both 
lungs. There is left pleural effusion. There is some spiculated density in the left upper lobe extend
ing to the left pulmonary hilum.

 

No evidence of filling defect in the pulmonary arteries. Heart size is normal. There are rounded mass
es at the left cardiac border in the pericardial fat that measure up to 2.5 cm. Upper abdominal soft 
tissues show no dilated ducts. No adrenal mass. Abdominal aorta is atheromatous. No aneurysm. Thoraci
c aorta is intact. No aneurysm. No dissection.

 

Thoracic vertebra show multiple compression fractures and biconcave deformities. There is vertebropla
sty in the lower thoracic spine. Compression fractures of  up to 50% in multiple mid and lower thorac
ic vertebra. The sternum is intact.

 

IMPRESSION:

No evidence of pulmonary embolism. 

 

There is mediastinal and left bronchial adenopathy with improvement in the left hilar mass compared t
o old exam that suggests a favorable treatment response. There is left upper lobe spiculated infiltra
te extending from the left pulmonary hilum to the left lung apex which is slightly decreased compared
 to old exam. There is encasement of the left lower lobe pulmonary artery with tumor mass and artery 
narrowing on old exam that is improved on today's exam.

 

 COPD. Patchy bilateral reticular pulmonary interstitial infiltrates which are increased compared to 
old exam. There is small left pleural effusion which is increased compared to the old exam. Pericardi
al tumor masses not significantly different than last exam.

## 2022-11-03 LAB
ANION GAP SERPL CALC-SCNC: 8 MMOL/L
BUN SERPL-SCNC: 13 MG/DL (ref 9–20)
CALCIUM SPEC-MCNC: 7.1 MG/DL (ref 8.4–10.2)
CHLORIDE SERPL-SCNC: 109 MMOL/L (ref 98–107)
CO2 SERPL-SCNC: 20 MMOL/L (ref 22–30)
ERYTHROCYTE [DISTWIDTH] IN BLOOD BY AUTOMATED COUNT: 2.15 M/UL (ref 4.3–5.9)
ERYTHROCYTE [DISTWIDTH] IN BLOOD BY AUTOMATED COUNT: 2.22 M/UL (ref 4.3–5.9)
ERYTHROCYTE [DISTWIDTH] IN BLOOD: 16.8 % (ref 11.5–15.5)
ERYTHROCYTE [DISTWIDTH] IN BLOOD: 18.3 % (ref 11.5–15.5)
GLUCOSE BLD-MCNC: 128 MG/DL (ref 70–110)
GLUCOSE BLD-MCNC: 191 MG/DL (ref 70–110)
GLUCOSE BLD-MCNC: 240 MG/DL (ref 70–110)
GLUCOSE BLD-MCNC: 254 MG/DL (ref 70–110)
GLUCOSE SERPL-MCNC: 108 MG/DL (ref 74–99)
HCT VFR BLD AUTO: 20 % (ref 39–53)
HCT VFR BLD AUTO: 22.6 % (ref 39–53)
HGB BLD-MCNC: 7.2 GM/DL (ref 13–17.5)
HGB BLD-MCNC: 7.5 GM/DL (ref 13–17.5)
MCH RBC QN AUTO: 33.3 PG (ref 25–35)
MCH RBC QN AUTO: 33.7 PG (ref 25–35)
MCHC RBC AUTO-ENTMCNC: 33.2 G/DL (ref 31–37)
MCHC RBC AUTO-ENTMCNC: 35.9 G/DL (ref 31–37)
MCV RBC AUTO: 101.6 FL (ref 80–100)
MCV RBC AUTO: 92.7 FL (ref 80–100)
PLATELET # BLD AUTO: 37 K/UL (ref 150–450)
PLATELET # BLD AUTO: 7 K/UL (ref 150–450)
POTASSIUM SERPL-SCNC: 3.8 MMOL/L (ref 3.5–5.1)
SODIUM SERPL-SCNC: 137 MMOL/L (ref 137–145)
WBC # BLD AUTO: 0.1 K/UL (ref 3.8–10.6)
WBC # BLD AUTO: 0.3 K/UL (ref 3.8–10.6)

## 2022-11-03 RX ADMIN — DOXYCYCLINE SCH MG: 100 CAPSULE ORAL at 19:45

## 2022-11-03 RX ADMIN — PANTOPRAZOLE SODIUM SCH MG: 40 INJECTION, POWDER, FOR SOLUTION INTRAVENOUS at 08:14

## 2022-11-03 RX ADMIN — AMOXICILLIN AND CLAVULANATE POTASSIUM SCH EACH: 875; 125 TABLET, FILM COATED ORAL at 10:28

## 2022-11-03 RX ADMIN — INSULIN ASPART SCH UNIT: 100 INJECTION, SOLUTION INTRAVENOUS; SUBCUTANEOUS at 20:58

## 2022-11-03 RX ADMIN — CEFAZOLIN SCH MLS/HR: 330 INJECTION, POWDER, FOR SOLUTION INTRAMUSCULAR; INTRAVENOUS at 00:17

## 2022-11-03 RX ADMIN — SUCRALFATE SCH GM: 1 TABLET ORAL at 19:45

## 2022-11-03 RX ADMIN — CEFAZOLIN SCH: 330 INJECTION, POWDER, FOR SOLUTION INTRAMUSCULAR; INTRAVENOUS at 20:59

## 2022-11-03 RX ADMIN — BUDESONIDE SCH MG: 0.25 SUSPENSION RESPIRATORY (INHALATION) at 20:40

## 2022-11-03 RX ADMIN — INSULIN ASPART SCH: 100 INJECTION, SOLUTION INTRAVENOUS; SUBCUTANEOUS at 06:11

## 2022-11-03 RX ADMIN — IPRATROPIUM BROMIDE AND ALBUTEROL SULFATE PRN ML: .5; 3 SOLUTION RESPIRATORY (INHALATION) at 07:31

## 2022-11-03 RX ADMIN — MORPHINE SULFATE PRN MG: 15 TABLET ORAL at 20:59

## 2022-11-03 RX ADMIN — SUCRALFATE SCH GM: 1 TABLET ORAL at 10:26

## 2022-11-03 RX ADMIN — DOXYCYCLINE SCH MG: 100 CAPSULE ORAL at 00:17

## 2022-11-03 RX ADMIN — METOPROLOL TARTRATE SCH MG: 25 TABLET, FILM COATED ORAL at 12:09

## 2022-11-03 RX ADMIN — BUDESONIDE SCH: 0.25 SUSPENSION RESPIRATORY (INHALATION) at 07:32

## 2022-11-03 RX ADMIN — MORPHINE SULFATE PRN MG: 15 TABLET ORAL at 12:09

## 2022-11-03 RX ADMIN — INSULIN ASPART SCH UNIT: 100 INJECTION, SOLUTION INTRAVENOUS; SUBCUTANEOUS at 18:09

## 2022-11-03 RX ADMIN — INSULIN ASPART SCH UNIT: 100 INJECTION, SOLUTION INTRAVENOUS; SUBCUTANEOUS at 00:16

## 2022-11-03 RX ADMIN — INSULIN ASPART SCH UNIT: 100 INJECTION, SOLUTION INTRAVENOUS; SUBCUTANEOUS at 12:10

## 2022-11-03 RX ADMIN — FILGRASTIM-SNDZ SCH MCG: 480 INJECTION, SOLUTION INTRAVENOUS; SUBCUTANEOUS at 20:57

## 2022-11-03 RX ADMIN — DOXYCYCLINE SCH MG: 100 CAPSULE ORAL at 10:27

## 2022-11-03 RX ADMIN — AMOXICILLIN AND CLAVULANATE POTASSIUM SCH EACH: 875; 125 TABLET, FILM COATED ORAL at 00:17

## 2022-11-03 RX ADMIN — AMOXICILLIN AND CLAVULANATE POTASSIUM SCH EACH: 875; 125 TABLET, FILM COATED ORAL at 19:45

## 2022-11-03 RX ADMIN — SUCRALFATE SCH GM: 1 TABLET ORAL at 17:33

## 2022-11-03 RX ADMIN — METOPROLOL TARTRATE SCH MG: 25 TABLET, FILM COATED ORAL at 19:45

## 2022-11-03 RX ADMIN — CEFAZOLIN SCH MLS/HR: 330 INJECTION, POWDER, FOR SOLUTION INTRAMUSCULAR; INTRAVENOUS at 18:11

## 2022-11-03 RX ADMIN — CEFAZOLIN SCH: 330 INJECTION, POWDER, FOR SOLUTION INTRAMUSCULAR; INTRAVENOUS at 06:01

## 2022-11-03 NOTE — P.CONS
History of Present Illness





- Reason for Consult


Consult date: 11/03/22


SCLC, GIB


Requesting physician: Anoop Shearer





- Chief Complaint


SOB, JARRED





- History of Present Illness


Mister newman is a pleasant male patient of Dr. Freire, initially seen in consult at 

[facility] 6/76/21.  Admitted with progressive shortness of breath, chest x-ray 

revealed essentially opacification of the left hemithorax, CT showed large 

mediastinal mass 9.5 cm with pleural effusion.  He had thoracentesis, pathology 

positive for metastatic small cell carcinoma.  CT AP 6/28/21 showed large left 

sided pleural effusion, bilateral retroperitoneal nodes, periaortic nodes and 

multiple iliac chain and perirectal lymph nodes.  MRI of the brain 6/28/21 

showed no metastatic disease.  Patient opted for treatment.  He received First 

cycle ofcarboplatin/etoposide inpatient.  He completed 6 cycles around 11/9/21. 

CT CAP, stable disease, no new sites.  He was started on maintenance tecentriq 

1/12/22.  Treatment follow-up CT scan showed increase in size at the primary 

site and mediastinal nodes, felt to be pseudo-progression from immunotherapy.  

Treatment was continued, short term follow-up CT scan did confirm progression.  

He was started on second line treatment with carboplatin and irinotecan on 

3/17/22, he completed 6 cycles.  Treatment follow-up imaging unfortunately did 

show disease progression.  Patient was also experiencing back pain related to 

metastatic paraspinal lesions.  He completed radiation 10/11/22.  He had his 

first cycle of lurbinectedin that approximately 7 days ago.


He has a past medical history of hypertension, hyperlipidemia, diabetes mellitus

type 2, COPD and GERD.


He is currently admitted with maroon colored diarrhea 3, started yesterday, he 

has also had progressive and persistent shortness of breath for 1-2 days, denies

any other associated symptoms.  He denied fevers, oral irritation, painful 

swallowing, wheezing, productive cough, chest pain, nausea, vomiting,abdominal 

pain, cramping, rectal pain, no other bleeding.  He reports some relief him back

pain after radiation treatment.  He denied any acute side effects related to 

treatment last week. 


CTA was negative for PE, chest x-ray showing some scattered opacity, reporting 

decrease in size of left upper lobe tumor.  Hemoglobin was 6.5 on admit, status 

post 1 unit, hemoglobin 7.5.  Patient's platelets were 5000, status post 1 unit,

platelets are 7000.  WBC 0.1.  Vital signs are stable.





Review of Systems





10 point review of systems is negative except as stated in HPI





Past Medical History


Past Medical History: Cancer, COPD, GERD/Reflux


Additional Past Medical History / Comment(s): LUNG CANCER WITH TUMOR IN STOMACH 

AND STATES ALSO IN HIS NECK.,.  RECEIVING CHEMOTHERAPY., DDD WITH BACK ,HIP & 

LEG PAIN., (PAIN CLINIC PATIENT)., EMPHYSEMA., GASTRITIS.


History of Any Multi-Drug Resistant Organisms: None Reported


Past Surgical History: Joint Replacement, Orthopedic Surgery


Additional Past Surgical History / Comment(s): RIGHT AND LEFT TOTAL HIPS,  RIGHT

SHOULDER SURGERY.


Past Anesthesia/Blood Transfusion Reactions: No Reported Reaction


Smoking Status: Former smoker





- Past Family History


  ** Father


Family Medical History: No Reported History





Medications and Allergies


                                Home Medications











 Medication  Instructions  Recorded  Confirmed  Type


 


Atorvastatin [Lipitor] 20 mg PO DAILY 06/25/21 11/02/22 History


 


OXcarbazepine [Trileptal] 300 mg PO BID 06/25/21 11/02/22 History


 


Pantoprazole Sodium [Protonix] 40 mg PO BID 06/25/21 11/02/22 History


 


Sucralfate [Carafate] 1 gm PO ACHS 06/25/21 11/02/22 History


 


traZODone HCL [Desyrel] 100 mg PO HS 06/25/21 11/02/22 History


 


ALPRAZolam [Xanax] 0.5 mg PO BID PRN 04/29/22 11/02/22 History


 


Budesonide [Pulmicort] 0.25 mg INHALATION RT-BID 04/29/22 11/02/22 History


 


Hydrocortisone [Cortef] 10 mg PO DAILY@1400 04/29/22 11/02/22 History


 


Ipratropium-Albuterol Nebulize 3 ml INHALATION RT-QID PRN 04/29/22 11/02/22 

History





[Duoneb 0.5 mg-3 mg/3 ml Soln]    


 


metFORMIN  mg PO BID 04/29/22 11/02/22 History


 


Albuterol Nebulized [Ventolin 2.5 mg INHALATION RT-QID PRN 06/09/22 11/02/22 

History





Nebulized]    


 


Fluconazole [Diflucan] 100 mg PO DAILY PRN 06/09/22 11/02/22 History


 


Insulin Aspart [NovoLOG Flexpen] See Protocol SQ ACHS 06/10/22 11/02/22 History


 


Dicyclomine [Bentyl] 10 mg PO TID PRN 10 Days #30 07/27/22 11/02/22 Rx





 capsule   


 


Famotidine 40 mg PO DAILY 07/27/22 11/02/22 History


 


Metoclopramide [Reglan] 5 mg PO AC-BID PRN 07/27/22 11/02/22 History


 


Semaglutide [Rybelsus] 3 mg PO DAILY 07/27/22 11/02/22 History


 


Hydrocortisone [Cortef] 20 mg PO DAILY@0800 10/01/22 11/02/22 History


 


polyethylene glycoL 3350 [Miralax] 17 gm PO DAILY PRN 10/01/22 11/02/22 History


 


Morphine Sulfate Ir [MSIR] 30 mg PO Q4HR PRN 3 Days #18 tab 10/11/22 11/02/22 Rx


 


Gabapentin [Neurontin] 400 mg PO BID 11/02/22 11/02/22 History


 


Metoprolol Tartrate [Lopressor] 12.5 mg PO BID 11/02/22 11/02/22 History


 


Ondansetron [Zofran] 4 mg PO Q4H PRN 11/02/22 11/02/22 History


 


Sennosides-Docusate Sodium 2 tab PO BID PRN 11/02/22 11/02/22 History





[Senokot-S]    


 


fentaNYL 50MCG/HR PATCH [Duragesic 1 patch TRANSDERM Q72H 11/02/22 11/02/22 

History





50MCG/HR]    


 


oxyCODONE-APAP 10-325MG [Percocet 1 tab PO Q3H PRN 11/02/22 11/02/22 History





 mg]    








                                    Allergies











Allergy/AdvReac Type Severity Reaction Status Date / Time


 


No Known Allergies Allergy   Verified 11/02/22 22:49














Physical Exam


Vitals: 


                                   Vital Signs











  Temp Pulse Pulse Resp BP BP Pulse Ox


 


 11/03/22 08:07  97.5 F L   97  18   98/59  100


 


 11/03/22 07:43   100     


 


 11/03/22 07:33   97      97


 


 11/03/22 04:00  98.7 F   96  16   108/66  97


 


 11/03/22 02:00    102 H  18   


 


 11/03/22 00:52  98.1 F  99   18  144/76   100


 


 11/03/22 00:22  98.4 F   105 H  18   138/76  99


 


 11/02/22 23:30   100   20  110/58   100


 


 11/02/22 23:23  98 F  100   20  105/66   100


 


 11/02/22 23:03  98 F  100   20  105/65   99


 


 11/02/22 23:00  98 F  100   18  132/66   100


 


 11/02/22 22:31  98 F  100   18  107/56   100


 


 11/02/22 22:19   100   20  136/68   99


 


 11/02/22 22:12  98 F  100   20  124/70   99


 


 11/02/22 22:00  97.8 F  102 H   20  110/60   98


 


 11/02/22 21:00   100   20  140/66   99


 


 11/02/22 20:40   105 H   20  96/56   100


 


 11/02/22 19:36        100


 


 11/02/22 19:28  98.5 F  111 H   26 H  124/85   100








                                Intake and Output











 11/02/22 11/03/22 11/03/22





 22:59 06:59 14:59


 


Intake Total 264 550 


 


Balance 264 550 


 


Intake:   


 


  Oral  240 


 


  Blood Product 264 310 


 


    Platelet Pheresis Pas 264  





    Psoralen  Unit   





    [id number]   


 


     As-1  Unit 0 310 





    [id number]   


 


  Other 0  


 


    Platelet Pheresis Pas 0  





    Psoralen  Unit   





    [id number]   


 


Other:   


 


  Voiding Method  Urinal 


 


  Weight 74.389 kg 74.389 kg 














- Constitutional


General appearance: cooperative, mild distress, obese





Results


CBC & Chem 7: 


                                 11/03/22 06:52





                                 11/03/22 06:52


Labs: 


                  Abnormal Lab Results - Last 24 Hours (Table)











  11/02/22 11/02/22 11/02/22 Range/Units





  20:25 20:32 20:32 


 


WBC   0.2 L*   (3.8-10.6)  k/uL


 


RBC   1.78 L   (4.30-5.90)  m/uL


 


Hgb   6.5 L* D   (13.0-17.5)  gm/dL


 


Hct   18.9 L*   (39.0-53.0)  %


 


MCV   106.2 H D   (80.0-100.0)  fL


 


MCH   36.8 H   (25.0-35.0)  pg


 


RDW     (11.5-15.5)  %


 


Plt Count   5 L* D   (150-450)  k/uL


 


D-Dimer    1.76 H  (<0.60)  mg/L FEU


 


Sodium     (137-145)  mmol/L


 


Chloride     ()  mmol/L


 


Carbon Dioxide     (22-30)  mmol/L


 


Creatinine     (0.66-1.25)  mg/dL


 


Glucose     (74-99)  mg/dL


 


POC Glucose (mg/dL)     ()  mg/dL


 


Calcium     (8.4-10.2)  mg/dL


 


Total Protein     (6.3-8.2)  g/dL


 


Albumin     (3.5-5.0)  g/dL


 


Crossmatch  See Detail    














  11/02/22 11/02/22 11/03/22 Range/Units





  20:32 23:49 06:11 


 


WBC     (3.8-10.6)  k/uL


 


RBC     (4.30-5.90)  m/uL


 


Hgb     (13.0-17.5)  gm/dL


 


Hct     (39.0-53.0)  %


 


MCV     (80.0-100.0)  fL


 


MCH     (25.0-35.0)  pg


 


RDW     (11.5-15.5)  %


 


Plt Count     (150-450)  k/uL


 


D-Dimer     (<0.60)  mg/L FEU


 


Sodium  136 L    (137-145)  mmol/L


 


Chloride     ()  mmol/L


 


Carbon Dioxide  19 L    (22-30)  mmol/L


 


Creatinine  0.63 L    (0.66-1.25)  mg/dL


 


Glucose  115 H    (74-99)  mg/dL


 


POC Glucose (mg/dL)   418 H  128 H  ()  mg/dL


 


Calcium  7.5 L    (8.4-10.2)  mg/dL


 


Total Protein  5.5 L    (6.3-8.2)  g/dL


 


Albumin  2.8 L    (3.5-5.0)  g/dL


 


Crossmatch     














  11/03/22 11/03/22 Range/Units





  06:52 06:52 


 


WBC  0.1 L*   (3.8-10.6)  k/uL


 


RBC  2.22 L   (4.30-5.90)  m/uL


 


Hgb  7.5 L   (13.0-17.5)  gm/dL


 


Hct  22.6 L   (39.0-53.0)  %


 


MCV  101.6 H   (80.0-100.0)  fL


 


MCH    (25.0-35.0)  pg


 


RDW  16.8 H   (11.5-15.5)  %


 


Plt Count  7 L*   (150-450)  k/uL


 


D-Dimer    (<0.60)  mg/L FEU


 


Sodium    (137-145)  mmol/L


 


Chloride   109 H  ()  mmol/L


 


Carbon Dioxide   20 L  (22-30)  mmol/L


 


Creatinine   0.59 L  (0.66-1.25)  mg/dL


 


Glucose   108 H  (74-99)  mg/dL


 


POC Glucose (mg/dL)    ()  mg/dL


 


Calcium   7.1 L  (8.4-10.2)  mg/dL


 


Total Protein    (6.3-8.2)  g/dL


 


Albumin    (3.5-5.0)  g/dL


 


Crossmatch    











Chest x-ray: report reviewed


CT scan - chest: report reviewed





Assessment and Plan


(1) GI bleed


Current Visit: Yes   Status: Acute   Code(s): K92.2 - GASTROINTESTINAL 

HEMORRHAGE, UNSPECIFIED   SNOMED Code(s): 55645210


   





(2) Primary small cell malignant neoplasm of lung, stage 4


Current Visit: No   Status: Acute   Code(s): C34.90 - MALIGNANT NEOPLASM OF UNSP

PART OF UNSP BRONCHUS OR LUNG   SNOMED Code(s): 39768633611591


   


Plan: 





Pancytopenia secondary to recent treatment.  Patient has a long history of 

treatment but also has impacted his bone marrow is ability to respond.  

Transfuse for hemoglobin less than 7-Patient did receive 1 unit with an 

appropriate increase in his hemoglobin.  Transfuse for platelet count less than 

10,000-Patient received 1 unit of platelets, count went from 2094-2764.  An 

additional unit of single donor platelets ordered for today.  WBC 0.1.  G-CSF 

initiated.  CBC daily.  


GI bleed.  Patient had 3 dark, red bloody bowel movements.  No rectal pain, 

abdominal pain or cramping.  No bowel movement documented since admit.  GI has 

been consulted.


Shortness of breath.  Possibly related to opacities in the lung/pneumonia in 

conjunction with anemia, patient's hemoglobin tends to run between 9 and 10.  He

has been transfused, hemoglobin 7.5, he is comfortable at rest, short of breath 

with activity.  Encouraged short distance ambulation with staff.


Patient just started zepzelca, CTA reports some improvement in tumor size.  

Would like to be able to continue patient on the same.  He is going to need tra

nsfusion support.  We'll likely add G-CSF as well.  Will see how patient does 

during hospital course.


Continue pain medications as prescribed from the office. The medications pt is 

receiving from Oncology were reconciled.

## 2022-11-03 NOTE — P.HPIM
History of Present Illness


H&P Date: 11/03/22


Chief Complaint: Rectal bleeding


This is a pleasant 62-year-old gentleman with past medical history of metastatic

lung disease/ primary small cell malignant neoplasm of lung, stage IV presented 

to the ER with Sunil rectal bleeding X 3, accompanied by lower abdominal 

cramping and exertional shortness of breath.  Reports some nausea, denies 

emesis.  Also reports extensive workup of abdominal pain and per oncology 

attributing it to chemo effect. Last chemotherapy approximately 1 week ago. 

Chest CTA reported : no evidence of pulmonary embolism.  Mediastinal left 

bronchial adenopathy with improvement in the left hilar mass compared to old 

exam.  Slightly decreased left upper lobe spiculated infiltrate extending from 

the left pulmonary hilum to the left lung apex,compared to old exam.  Improved 

encasement of the left lower lobe pulmonary artery with tumor mass and artery 

narrowing compared to old exam.COPD. Increased patchy bilateral reticular 

pulmonary interstitial infiltrates compared to prior exam.  Increased small left

pleural effusion compared to prior exam.  Pericardial tumor masses not 

significantly different than last exam.  On admission :WBC 0.2 hemoglobin 6.5 

hematocrit 18.9 platelet count 5 INR 1.0 d-dimer 1.76 sodium 136 potassium 4.4 

BUN 12 creatinine 0.63 glucose 115, bilirubin 0.4 AST 23 ALT 22 alk phos 60. 

Repeat labs today WBC 0.1 hemoglobin 7.5 hematocrit 22 platelet count 7. 

Received 1 unit of packed red blood cell transfusion as well as 1 unit of 

platelets.  Denies any further rectal bleeding since admission.  Denies 

shortness of breath at rest.  Denies chest pain, palpitations.














Review of Systems


ROS Statement: 


Those systems with pertinent positive or pertinent negative responses have been 

documented in the HPI.





ROS Other: All systems not noted in ROS Statement are negative.











Past Medical History


Past Medical History: Cancer, COPD, GERD/Reflux


Additional Past Medical History / Comment(s): LUNG CANCER WITH TUMOR IN STOMACH 

AND STATES ALSO IN HIS NECK.,.  RECEIVING CHEMOTHERAPY., DDD WITH BACK ,HIP & 

LEG PAIN., (PAIN CLINIC PATIENT)., EMPHYSEMA., GASTRITIS.


History of Any Multi-Drug Resistant Organisms: None Reported


Past Surgical History: Joint Replacement, Orthopedic Surgery


Additional Past Surgical History / Comment(s): RIGHT AND LEFT TOTAL HIPS,  RIGHT

SHOULDER SURGERY.


Past Anesthesia/Blood Transfusion Reactions: No Reported Reaction


Smoking Status: Former smoker





- Past Family History


  ** Father


Family Medical History: No Reported History





Medications and Allergies


                                Home Medications











 Medication  Instructions  Recorded  Confirmed  Type


 


Atorvastatin [Lipitor] 20 mg PO DAILY 06/25/21 11/02/22 History


 


OXcarbazepine [Trileptal] 300 mg PO BID 06/25/21 11/02/22 History


 


Pantoprazole Sodium [Protonix] 40 mg PO BID 06/25/21 11/02/22 History


 


Sucralfate [Carafate] 1 gm PO ACHS 06/25/21 11/02/22 History


 


traZODone HCL [Desyrel] 100 mg PO HS 06/25/21 11/02/22 History


 


ALPRAZolam [Xanax] 0.5 mg PO BID PRN 04/29/22 11/02/22 History


 


Budesonide [Pulmicort] 0.25 mg INHALATION RT-BID 04/29/22 11/02/22 History


 


Hydrocortisone [Cortef] 10 mg PO DAILY@1400 04/29/22 11/02/22 History


 


Ipratropium-Albuterol Nebulize 3 ml INHALATION RT-QID PRN 04/29/22 11/02/22 

History





[Duoneb 0.5 mg-3 mg/3 ml Soln]    


 


metFORMIN  mg PO BID 04/29/22 11/02/22 History


 


Albuterol Nebulized [Ventolin 2.5 mg INHALATION RT-QID PRN 06/09/22 11/02/22 

History





Nebulized]    


 


Fluconazole [Diflucan] 100 mg PO DAILY PRN 06/09/22 11/02/22 History


 


Insulin Aspart [NovoLOG Flexpen] See Protocol SQ ACHS 06/10/22 11/02/22 History


 


Dicyclomine [Bentyl] 10 mg PO TID PRN 10 Days #30 07/27/22 11/02/22 Rx





 capsule   


 


Famotidine 40 mg PO DAILY 07/27/22 11/02/22 History


 


Metoclopramide [Reglan] 5 mg PO AC-BID PRN 07/27/22 11/02/22 History


 


Semaglutide [Rybelsus] 3 mg PO DAILY 07/27/22 11/02/22 History


 


Hydrocortisone [Cortef] 20 mg PO DAILY@0800 10/01/22 11/02/22 History


 


polyethylene glycoL 3350 [Miralax] 17 gm PO DAILY PRN 10/01/22 11/02/22 History


 


Morphine Sulfate Ir [MSIR] 30 mg PO Q4HR PRN 3 Days #18 tab 10/11/22 11/02/22 Rx


 


Gabapentin [Neurontin] 400 mg PO BID 11/02/22 11/02/22 History


 


Metoprolol Tartrate [Lopressor] 12.5 mg PO BID 11/02/22 11/02/22 History


 


Ondansetron [Zofran] 4 mg PO Q4H PRN 11/02/22 11/02/22 History


 


Sennosides-Docusate Sodium 2 tab PO BID PRN 11/02/22 11/02/22 History





[Senokot-S]    


 


fentaNYL 50MCG/HR PATCH [Duragesic 1 patch TRANSDERM Q72H 11/02/22 11/02/22 

History





50MCG/HR]    


 


oxyCODONE-APAP 10-325MG [Percocet 1 tab PO Q3H PRN 11/02/22 11/02/22 History





 mg]    








                                    Allergies











Allergy/AdvReac Type Severity Reaction Status Date / Time


 


No Known Allergies Allergy   Verified 11/02/22 22:49














Physical Exam


Vitals: 


                                   Vital Signs











  Temp Pulse Pulse Resp BP BP Pulse Ox


 


 11/03/22 08:07  97.5 F L   97  18   98/59  100


 


 11/03/22 07:43   100     


 


 11/03/22 07:33   97      97


 


 11/03/22 04:00  98.7 F   96  16   108/66  97


 


 11/03/22 02:00    102 H  18   


 


 11/03/22 00:52  98.1 F  99   18  144/76   100


 


 11/03/22 00:22  98.4 F   105 H  18   138/76  99


 


 11/02/22 23:30   100   20  110/58   100


 


 11/02/22 23:23  98 F  100   20  105/66   100


 


 11/02/22 23:03  98 F  100   20  105/65   99


 


 11/02/22 23:00  98 F  100   18  132/66   100


 


 11/02/22 22:31  98 F  100   18  107/56   100


 


 11/02/22 22:19   100   20  136/68   99


 


 11/02/22 22:12  98 F  100   20  124/70   99


 


 11/02/22 22:00  97.8 F  102 H   20  110/60   98


 


 11/02/22 21:00   100   20  140/66   99


 


 11/02/22 20:40   105 H   20  96/56   100


 


 11/02/22 19:36        100


 


 11/02/22 19:28  98.5 F  111 H   26 H  124/85   100








                                Intake and Output











 11/02/22 11/03/22 11/03/22





 22:59 06:59 14:59


 


Intake Total 264 550 


 


Balance 264 550 


 


Intake:   


 


  Oral  240 


 


  Blood Product 264 310 


 


    Platelet Pheresis Pas 264  





    Psoralen  Unit   





    [id number]   


 


     As-1  Unit 0 310 





    [id number]   


 


  Other 0  


 


    Platelet Pheresis Pas 0  





    Psoralen  Unit   





    [id number]   


 


Other:   


 


  Voiding Method  Urinal 


 


  Weight 74.389 kg 74.389 kg 














- Exam


PHYSICAL EXAM:


VITAL SIGNS: [As above]


GENERAL: Sitting up in bed, pale, Alert and oriented 3,no acute distress


HEENT:  Conjunctivae normal. eyes normal. MMM.


NECK: Supple, No JVD.


CARDIOVASCULAR:  S1, S2 regular, mild tachycardia. No murmur.


RESPIRATION: Breath sounds diminished in the bases. No rhonchi or crackles. 


ABDOMEN:  Soft, nontender, No guarding.Bowel sounds heard.


LEGS:  No edema. no swelling.


NERVOUS SYSTEM:  Cranial N 2-12 grossly normal.No focal deficits. Strength and 

sensation grossly intact.


Skin: Warm and dry, no rash 











Results


CBC & Chem 7: 


                                 11/03/22 06:52





                                 11/03/22 06:52


Labs: 


                  Abnormal Lab Results - Last 24 Hours (Table)











  11/02/22 11/02/22 11/02/22 Range/Units





  20:25 20:32 20:32 


 


WBC   0.2 L*   (3.8-10.6)  k/uL


 


RBC   1.78 L   (4.30-5.90)  m/uL


 


Hgb   6.5 L* D   (13.0-17.5)  gm/dL


 


Hct   18.9 L*   (39.0-53.0)  %


 


MCV   106.2 H D   (80.0-100.0)  fL


 


MCH   36.8 H   (25.0-35.0)  pg


 


RDW     (11.5-15.5)  %


 


Plt Count   5 L* D   (150-450)  k/uL


 


D-Dimer    1.76 H  (<0.60)  mg/L FEU


 


Sodium     (137-145)  mmol/L


 


Chloride     ()  mmol/L


 


Carbon Dioxide     (22-30)  mmol/L


 


Creatinine     (0.66-1.25)  mg/dL


 


Glucose     (74-99)  mg/dL


 


POC Glucose (mg/dL)     ()  mg/dL


 


Calcium     (8.4-10.2)  mg/dL


 


Total Protein     (6.3-8.2)  g/dL


 


Albumin     (3.5-5.0)  g/dL


 


Crossmatch  See Detail    














  11/02/22 11/02/22 11/03/22 Range/Units





  20:32 23:49 06:11 


 


WBC     (3.8-10.6)  k/uL


 


RBC     (4.30-5.90)  m/uL


 


Hgb     (13.0-17.5)  gm/dL


 


Hct     (39.0-53.0)  %


 


MCV     (80.0-100.0)  fL


 


MCH     (25.0-35.0)  pg


 


RDW     (11.5-15.5)  %


 


Plt Count     (150-450)  k/uL


 


D-Dimer     (<0.60)  mg/L FEU


 


Sodium  136 L    (137-145)  mmol/L


 


Chloride     ()  mmol/L


 


Carbon Dioxide  19 L    (22-30)  mmol/L


 


Creatinine  0.63 L    (0.66-1.25)  mg/dL


 


Glucose  115 H    (74-99)  mg/dL


 


POC Glucose (mg/dL)   418 H  128 H  ()  mg/dL


 


Calcium  7.5 L    (8.4-10.2)  mg/dL


 


Total Protein  5.5 L    (6.3-8.2)  g/dL


 


Albumin  2.8 L    (3.5-5.0)  g/dL


 


Crossmatch     














  11/03/22 11/03/22 Range/Units





  06:52 06:52 


 


WBC  0.1 L*   (3.8-10.6)  k/uL


 


RBC  2.22 L   (4.30-5.90)  m/uL


 


Hgb  7.5 L   (13.0-17.5)  gm/dL


 


Hct  22.6 L   (39.0-53.0)  %


 


MCV  101.6 H   (80.0-100.0)  fL


 


MCH    (25.0-35.0)  pg


 


RDW  16.8 H   (11.5-15.5)  %


 


Plt Count  7 L*   (150-450)  k/uL


 


D-Dimer    (<0.60)  mg/L FEU


 


Sodium    (137-145)  mmol/L


 


Chloride   109 H  ()  mmol/L


 


Carbon Dioxide   20 L  (22-30)  mmol/L


 


Creatinine   0.59 L  (0.66-1.25)  mg/dL


 


Glucose   108 H  (74-99)  mg/dL


 


POC Glucose (mg/dL)    ()  mg/dL


 


Calcium   7.1 L  (8.4-10.2)  mg/dL


 


Total Protein    (6.3-8.2)  g/dL


 


Albumin    (3.5-5.0)  g/dL


 


Crossmatch    














Thrombosis Risk Factor Assmnt





- Choose All That Apply


Each Factor Represents 1 point: Obesity (BMI >25), Serious lung disease incl. 

pneumonia (< 1month)


Other Risk Factors: Yes


Each Risk Factor Represents 2 Points: Age 61-74 years


Thrombosis Risk Factor Assessment Total Risk Factor Score: 4


Thrombosis Risk Factor Assessment Level: Moderate Risk





Assessment and Plan


Assessment: 


Acute GI bleed


Pancytopenia secondary to recent treatment,Last received chemotherapy 1 week ago


Chronic abdominal, back pain due to metastatic lung disease in a patient with 

history of primary small cell malignant neoplasm of lung, stage IV.  Fentanyl 

patch dose increased last week with improvement in pain.


History of Adrenal mass likely secondary to metastatic lesion.


COPD


GERD


Anxiety/depression


Previous history of smoking


Adrenal insufficiency currently on Cortef at home.

















Plan: Continue on current medication regime ,monitoring and symptomatic 

treatment.  Neutropenic precautions initiated.  Platelets and another unit of 

packed RBCs ordered.  Maintain PPI, Carafate .Continue on empiric Augmentin and 

doxycycline.  GI and oncology/hematology consult in place with recommendations 

noted and appreciated.  CODE STATUS discussed and patient wants to remain a full

code at this time .Prognosis guarded given multiple complex medical issues.














The impression and plan of care has been dictated as directed.





:


I performed a history and examination of this patient,  discussed the same with 

the dictator.  I agree with the dictator's note ,documented as a scribe.  Any 

additional findings or plans will be noted.

## 2022-11-03 NOTE — P.CONS
History of Present Illness





- Reason for Consult


Consult date: 11/03/22


GI bleed


Requesting physician: Anoop Shearer





- Chief Complaint


Bloody stools





- History of Present Illness





This is a pleasant 62-year-old male with a history of metastatic lung cancer 

with metastasis to the stomach and neck diagnosed in June 2021.  He presented to

the emergency department with shortness of breath for the past 2 days along with

3 episodes of bloody stools.  He has undergone chemo medial therapy in the past 

and is currently under chemotherapy treatment through [facility].  He 

states that he underwent his first treatment within the last month.  He states 

that he came into the hospital because he had 3 medium red bloody bowel 

movements yesterday.  States he was having some lower abdominal cramping 

followed by the bleeding.  He states he had some mild nausea but no vomiting.  

Denies any previous history of GI bleed.  Denies any NSAID use or anti

coagulation.  He states that he is on Carafate and Protonix for history of GERD.

 Patient recently underwent EGD and colonoscopy 10/6/22 with Dr. Mcbride for 

abdominal pain.  EGD found a mild chronic gastritis and colonoscopy.  Osseous 

had any bleeding today.  Last episode was yesterday evening.  States he has 

chronic abdominal pain, and improved with his Carafate and Protonix.


He had a CT angiogram of the chest that reported no evidence of pulmonary 

embolism.  Mediastinal left bronchial adenopathy with improvement in the left 

hilar mass compared to old exam.  Left upper lobe spiculated infiltrate 

extending from the left pulmonary hilum to the left lung apex which is slightly 

decreased compared to old exam.  Encasement of the left lower lobe pulmonary 

artery with tumor mass and artery narrowing on old exam that is improved on 

today's exam.  COPD.  Patchy bilateral reticular pulmonary interstitial 

infiltrates which are increased compared to old exam.  Small left pleural 

effusion which is increased compared to old exam.  Pericardial tumor masses that

significantly different than last exam.  Repeat labs today WBC 0.5 hemoglobin 

7.5 hematocrit 22 platelet count 7000.  Patient has received 1 unit of packed 

red blood cell transfusion as well as 1 unit of platelets.





Admitting labs: WBC 0.2 hemoglobin 6.5 hematocrit 18.9 platelet count 5000 INR 

1.0 d-dimer 1.76 sodium 136 potassium 4.4 BUN 12 creatinine 0.63 glucose 1:15, 

bilirubin 0.4 AST 23 ALT 22 alkaline phosphatase 60








Review of Systems





REVIEW OF SYSTEMS:


CARDIOPULMONARY: No chest pain.  Shortness of breath.


Gastrointestinal: Chronic abdominal pain.   No nausea or vomiting.  No 

hematemesis, coffee-ground emesis.  Rectal bleeding, more of a medium to dark 

red.


GENITOURINARY:  No dysuria or hematuria. 


MUSCULOSKELETAL: Reports normal range of motion.


SKIN: No rashes.  No jaundice.


ENDOCRINE: No chills, fevers.  No excessive weight gain or loss.  No polydipsia 

or polyuria.


PSYCHIATRIC: Unremarkable. 


NEUROLOGY: No change in mental status.  Denies dizziness, headache.


ENT: Vision unremarkable. 


CONSTITUTIONAL: No recent weight loss. No fever, chills, night sweats. 





Past Medical History


Past Medical History: Cancer, COPD, GERD/Reflux


Additional Past Medical History / Comment(s): LUNG CANCER WITH TUMOR IN STOMACH 

AND STATES ALSO IN HIS NECK.,.  RECEIVING CHEMOTHERAPY., DDD WITH BACK ,HIP & 

LEG PAIN., (PAIN CLINIC PATIENT)., EMPHYSEMA., GASTRITIS.


History of Any Multi-Drug Resistant Organisms: None Reported


Past Surgical History: Joint Replacement, Orthopedic Surgery


Additional Past Surgical History / Comment(s): RIGHT AND LEFT TOTAL HIPS,  RIGHT

SHOULDER SURGERY.


Past Anesthesia/Blood Transfusion Reactions: No Reported Reaction


Smoking Status: Former smoker





- Past Family History


  ** Father


Family Medical History: No Reported History





Medications and Allergies


                                Home Medications











 Medication  Instructions  Recorded  Confirmed  Type


 


Atorvastatin [Lipitor] 20 mg PO DAILY 06/25/21 11/02/22 History


 


OXcarbazepine [Trileptal] 300 mg PO BID 06/25/21 11/02/22 History


 


Pantoprazole Sodium [Protonix] 40 mg PO BID 06/25/21 11/02/22 History


 


Sucralfate [Carafate] 1 gm PO ACHS 06/25/21 11/02/22 History


 


traZODone HCL [Desyrel] 100 mg PO HS 06/25/21 11/02/22 History


 


ALPRAZolam [Xanax] 0.5 mg PO BID PRN 04/29/22 11/02/22 History


 


Budesonide [Pulmicort] 0.25 mg INHALATION RT-BID 04/29/22 11/02/22 History


 


Hydrocortisone [Cortef] 10 mg PO DAILY@1400 04/29/22 11/02/22 History


 


Ipratropium-Albuterol Nebulize 3 ml INHALATION RT-QID PRN 04/29/22 11/02/22 

History





[Duoneb 0.5 mg-3 mg/3 ml Soln]    


 


metFORMIN  mg PO BID 04/29/22 11/02/22 History


 


Albuterol Nebulized [Ventolin 2.5 mg INHALATION RT-QID PRN 06/09/22 11/02/22 

History





Nebulized]    


 


Fluconazole [Diflucan] 100 mg PO DAILY PRN 06/09/22 11/02/22 History


 


Insulin Aspart [NovoLOG Flexpen] See Protocol SQ ACHS 06/10/22 11/02/22 History


 


Dicyclomine [Bentyl] 10 mg PO TID PRN 10 Days #30 07/27/22 11/02/22 Rx





 capsule   


 


Famotidine 40 mg PO DAILY 07/27/22 11/02/22 History


 


Metoclopramide [Reglan] 5 mg PO AC-BID PRN 07/27/22 11/02/22 History


 


Semaglutide [Rybelsus] 3 mg PO DAILY 07/27/22 11/02/22 History


 


Hydrocortisone [Cortef] 20 mg PO DAILY@0800 10/01/22 11/02/22 History


 


polyethylene glycoL 3350 [Miralax] 17 gm PO DAILY PRN 10/01/22 11/02/22 History


 


Morphine Sulfate Ir [MSIR] 30 mg PO Q4HR PRN 3 Days #18 tab 10/11/22 11/02/22 Rx


 


Gabapentin [Neurontin] 400 mg PO BID 11/02/22 11/02/22 History


 


Metoprolol Tartrate [Lopressor] 12.5 mg PO BID 11/02/22 11/02/22 History


 


Ondansetron [Zofran] 4 mg PO Q4H PRN 11/02/22 11/02/22 History


 


Sennosides-Docusate Sodium 2 tab PO BID PRN 11/02/22 11/02/22 History





[Senokot-S]    


 


fentaNYL 50MCG/HR PATCH [Duragesic 1 patch TRANSDERM Q72H 11/02/22 11/02/22 

History





50MCG/HR]    


 


oxyCODONE-APAP 10-325MG [Percocet 1 tab PO Q3H PRN 11/02/22 11/02/22 History





 mg]    








                                    Allergies











Allergy/AdvReac Type Severity Reaction Status Date / Time


 


No Known Allergies Allergy   Verified 11/02/22 22:49














Physical Exam


Vitals: 


                                   Vital Signs











  Temp Pulse Pulse Resp BP BP Pulse Ox


 


 11/03/22 08:07  97.5 F L   97  18   98/59  100


 


 11/03/22 07:43   100     


 


 11/03/22 07:33   97      97


 


 11/03/22 04:00  98.7 F   96  16   108/66  97


 


 11/03/22 02:00    102 H  18   


 


 11/03/22 00:52  98.1 F  99   18  144/76   100


 


 11/03/22 00:22  98.4 F   105 H  18   138/76  99


 


 11/02/22 23:30   100   20  110/58   100


 


 11/02/22 23:23  98 F  100   20  105/66   100


 


 11/02/22 23:03  98 F  100   20  105/65   99


 


 11/02/22 23:00  98 F  100   18  132/66   100


 


 11/02/22 22:31  98 F  100   18  107/56   100


 


 11/02/22 22:19   100   20  136/68   99


 


 11/02/22 22:12  98 F  100   20  124/70   99


 


 11/02/22 22:00  97.8 F  102 H   20  110/60   98


 


 11/02/22 21:00   100   20  140/66   99


 


 11/02/22 20:40   105 H   20  96/56   100


 


 11/02/22 19:36        100


 


 11/02/22 19:28  98.5 F  111 H   26 H  124/85   100








                                Intake and Output











 11/02/22 11/03/22 11/03/22





 22:59 06:59 14:59


 


Intake Total 264 550 0


 


Output Total   275


 


Balance 264 550 -275


 


Intake:   


 


  Oral  240 


 


  Blood Product 264 310 0


 


    Platelet Pheresis Pas 264  





    Psoralen  Unit   





    [id number]   


 


    Platelet Pheresis Pas   0





    Psoralen  Unit   





    [id number]   


 


    Rc As-1  Unit 0 310 





    [id number]   


 


  Other 0  


 


    Platelet Pheresis Pas 0  





    Psoralen  Unit   





    [id number]   


 


Output:   


 


  Urine   275


 


Other:   


 


  Voiding Method  Urinal Urinal


 


  Weight 74.389 kg 74.389 kg 














General appearance: The patient is alert, oriented, appears in no acute 

distress.


HET: Head is normocephalic and atraumatic.  Conjunctiva pink.  Sclera anicteric.


Neck: Supple without lymphadenopathy.  Trachea midline.


Heart: S1 S2.  Regular rate and rhythm.


Lungs: Clear to auscultation.


Abdomen: Soft, nontender, nondistended with  bowel sounds.  No guarding or 

rigidity.


Skin:  No rashes. No jaundice.


Extremities: Normal skin color and turgor.  No pedal edema.


Neurological: No focal deficits.  Alert and oriented x3.





Results


CBC & Chem 7: 


                                 11/03/22 06:52





                                 11/03/22 06:52


Labs: 


                  Abnormal Lab Results - Last 24 Hours (Table)











  11/02/22 11/02/22 11/02/22 Range/Units





  20:25 20:32 20:32 


 


WBC   0.2 L*   (3.8-10.6)  k/uL


 


RBC   1.78 L   (4.30-5.90)  m/uL


 


Hgb   6.5 L* D   (13.0-17.5)  gm/dL


 


Hct   18.9 L*   (39.0-53.0)  %


 


MCV   106.2 H D   (80.0-100.0)  fL


 


MCH   36.8 H   (25.0-35.0)  pg


 


RDW     (11.5-15.5)  %


 


Plt Count   5 L* D   (150-450)  k/uL


 


D-Dimer    1.76 H  (<0.60)  mg/L FEU


 


Sodium     (137-145)  mmol/L


 


Chloride     ()  mmol/L


 


Carbon Dioxide     (22-30)  mmol/L


 


Creatinine     (0.66-1.25)  mg/dL


 


Glucose     (74-99)  mg/dL


 


POC Glucose (mg/dL)     ()  mg/dL


 


Calcium     (8.4-10.2)  mg/dL


 


Total Protein     (6.3-8.2)  g/dL


 


Albumin     (3.5-5.0)  g/dL


 


Crossmatch  See Detail    














  11/02/22 11/02/22 11/03/22 Range/Units





  20:32 23:49 06:11 


 


WBC     (3.8-10.6)  k/uL


 


RBC     (4.30-5.90)  m/uL


 


Hgb     (13.0-17.5)  gm/dL


 


Hct     (39.0-53.0)  %


 


MCV     (80.0-100.0)  fL


 


MCH     (25.0-35.0)  pg


 


RDW     (11.5-15.5)  %


 


Plt Count     (150-450)  k/uL


 


D-Dimer     (<0.60)  mg/L FEU


 


Sodium  136 L    (137-145)  mmol/L


 


Chloride     ()  mmol/L


 


Carbon Dioxide  19 L    (22-30)  mmol/L


 


Creatinine  0.63 L    (0.66-1.25)  mg/dL


 


Glucose  115 H    (74-99)  mg/dL


 


POC Glucose (mg/dL)   418 H  128 H  ()  mg/dL


 


Calcium  7.5 L    (8.4-10.2)  mg/dL


 


Total Protein  5.5 L    (6.3-8.2)  g/dL


 


Albumin  2.8 L    (3.5-5.0)  g/dL


 


Crossmatch     














  11/03/22 11/03/22 Range/Units





  06:52 06:52 


 


WBC  0.1 L*   (3.8-10.6)  k/uL


 


RBC  2.22 L   (4.30-5.90)  m/uL


 


Hgb  7.5 L   (13.0-17.5)  gm/dL


 


Hct  22.6 L   (39.0-53.0)  %


 


MCV  101.6 H   (80.0-100.0)  fL


 


MCH    (25.0-35.0)  pg


 


RDW  16.8 H   (11.5-15.5)  %


 


Plt Count  7 L*   (150-450)  k/uL


 


D-Dimer    (<0.60)  mg/L FEU


 


Sodium    (137-145)  mmol/L


 


Chloride   109 H  ()  mmol/L


 


Carbon Dioxide   20 L  (22-30)  mmol/L


 


Creatinine   0.59 L  (0.66-1.25)  mg/dL


 


Glucose   108 H  (74-99)  mg/dL


 


POC Glucose (mg/dL)    ()  mg/dL


 


Calcium   7.1 L  (8.4-10.2)  mg/dL


 


Total Protein    (6.3-8.2)  g/dL


 


Albumin    (3.5-5.0)  g/dL


 


Crossmatch    











Comments: 





CT angiogram of the chest that reported no evidence of pulmonary embolism.  

Mediastinal left bronchial adenopathy with improvement in the left hilar mass 

compared to old exam.  Left upper lobe spiculated infiltrate extending from the 

left pulmonary hilum to the left lung apex which is slightly decreased compared 

to old exam.  Encasement of the left lower lobe pulmonary artery with tumor mass

and artery narrowing on old exam that is improved on today's exam.  COPD.  

Patchy bilateral reticular pulmonary interstitial infiltrates which are 

increased compared to old exam.  Small left pleural effusion which is increased 

compared to old exam.  Pericardial tumor masses that significantly different 

than last exam.  Some





Assessment and Plan


(1) GI bleed


Narrative/Plan: 


History 2-year-old male with history of low malignant lung cancer who presented 

to the emergency department with shortness of breath and 3 episodes of rectal 

bleeding.  Patient was noted to be thrombocytopenic with platelet count of 5000 

on admission and hemoglobin is 6.5.  He has received 1 unit of breath blood 

cells as well as platelets.  No further bleeding.  No previous history of GI 

bleed.  Underwent EGD colonoscopy 10/26/2022.  Colonoscopy with findings of 

lipoma, benign finding.  And EGD with findings of mild gastritis.  Unclear 

etiology at this time of rectal bleeding.  In light of current platelet count 

will taper her any endoscopic evaluation at this time.  Await further 

recommendations from hematology


Current Visit: Yes   Status: Acute   Code(s): K92.2 - GASTROINTESTINAL 

HEMORRHAGE, UNSPECIFIED   SNOMED Code(s): 60044049


   





(2) Pancytopenia


Narrative/Plan: 


Hematology following closely, continue with the recommendations.


Current Visit: Yes   Status: Acute   Code(s): D61.818 - OTHER PANCYTOPENIA   

SNOMED Code(s): 664553750


   





(3) Metastatic lung cancer (metastasis from lung to other site)


Current Visit: No   Status: Acute   Code(s): C34.90 - MALIGNANT NEOPLASM OF UNSP

PART OF UNSP BRONCHUS OR LUNG   SNOMED Code(s): 75814131


   


Plan: 





1.  Continue symptomatic and supportive care


2.  Daily CBC, transfuse for hemoglobin less than 7


3.  Agree with platelet transfusion, continue with recommendations from 

hematology 


4.  Protonix 40 mg twice a day 


5.  Continue with Carafate 


6.  No plans on endoscopic evaluation at this time 


Thank you for this consultation, we will continue to follow.





Dr. KATIE Stout


I agree with the dictator's note, documented as a scribe by Mily BOOTH.

## 2022-11-04 LAB
ERYTHROCYTE [DISTWIDTH] IN BLOOD BY AUTOMATED COUNT: 2.41 M/UL (ref 4.3–5.9)
ERYTHROCYTE [DISTWIDTH] IN BLOOD: 18.5 % (ref 11.5–15.5)
GLUCOSE BLD-MCNC: 102 MG/DL (ref 70–110)
GLUCOSE BLD-MCNC: 113 MG/DL (ref 70–110)
GLUCOSE BLD-MCNC: 140 MG/DL (ref 70–110)
GLUCOSE BLD-MCNC: 154 MG/DL (ref 70–110)
HCT VFR BLD AUTO: 22 % (ref 39–53)
HGB BLD-MCNC: 7.9 GM/DL (ref 13–17.5)
MCH RBC QN AUTO: 32.7 PG (ref 25–35)
MCHC RBC AUTO-ENTMCNC: 35.8 G/DL (ref 31–37)
MCV RBC AUTO: 91.4 FL (ref 80–100)
PLATELET # BLD AUTO: 28 K/UL (ref 150–450)
WBC # BLD AUTO: 0.5 K/UL (ref 3.8–10.6)

## 2022-11-04 RX ADMIN — SUCRALFATE SCH GM: 1 TABLET ORAL at 20:12

## 2022-11-04 RX ADMIN — INSULIN ASPART SCH: 100 INJECTION, SOLUTION INTRAVENOUS; SUBCUTANEOUS at 20:13

## 2022-11-04 RX ADMIN — INSULIN ASPART SCH UNIT: 100 INJECTION, SOLUTION INTRAVENOUS; SUBCUTANEOUS at 06:50

## 2022-11-04 RX ADMIN — PANTOPRAZOLE SODIUM SCH MG: 40 INJECTION, POWDER, FOR SOLUTION INTRAVENOUS at 07:53

## 2022-11-04 RX ADMIN — MORPHINE SULFATE PRN MG: 15 TABLET ORAL at 08:08

## 2022-11-04 RX ADMIN — METOPROLOL TARTRATE SCH: 25 TABLET, FILM COATED ORAL at 20:13

## 2022-11-04 RX ADMIN — Medication PRN MG: at 03:36

## 2022-11-04 RX ADMIN — INSULIN ASPART SCH: 100 INJECTION, SOLUTION INTRAVENOUS; SUBCUTANEOUS at 12:31

## 2022-11-04 RX ADMIN — SUCRALFATE SCH GM: 1 TABLET ORAL at 06:55

## 2022-11-04 RX ADMIN — DOXYCYCLINE SCH MG: 100 CAPSULE ORAL at 07:53

## 2022-11-04 RX ADMIN — METOPROLOL TARTRATE SCH MG: 25 TABLET, FILM COATED ORAL at 07:53

## 2022-11-04 RX ADMIN — BUDESONIDE SCH MG: 0.25 SUSPENSION RESPIRATORY (INHALATION) at 21:17

## 2022-11-04 RX ADMIN — CEFAZOLIN SCH MLS/HR: 330 INJECTION, POWDER, FOR SOLUTION INTRAMUSCULAR; INTRAVENOUS at 12:56

## 2022-11-04 RX ADMIN — IPRATROPIUM BROMIDE AND ALBUTEROL SULFATE PRN ML: .5; 3 SOLUTION RESPIRATORY (INHALATION) at 16:54

## 2022-11-04 RX ADMIN — DOXYCYCLINE SCH MG: 100 CAPSULE ORAL at 20:12

## 2022-11-04 RX ADMIN — CEFAZOLIN SCH: 330 INJECTION, POWDER, FOR SOLUTION INTRAMUSCULAR; INTRAVENOUS at 05:22

## 2022-11-04 RX ADMIN — FILGRASTIM-SNDZ SCH MCG: 480 INJECTION, SOLUTION INTRAVENOUS; SUBCUTANEOUS at 17:08

## 2022-11-04 RX ADMIN — BUDESONIDE SCH MG: 0.25 SUSPENSION RESPIRATORY (INHALATION) at 11:47

## 2022-11-04 RX ADMIN — SUCRALFATE SCH GM: 1 TABLET ORAL at 17:08

## 2022-11-04 RX ADMIN — INSULIN ASPART SCH: 100 INJECTION, SOLUTION INTRAVENOUS; SUBCUTANEOUS at 16:21

## 2022-11-04 RX ADMIN — IPRATROPIUM BROMIDE AND ALBUTEROL SULFATE PRN ML: .5; 3 SOLUTION RESPIRATORY (INHALATION) at 21:17

## 2022-11-04 RX ADMIN — AMOXICILLIN AND CLAVULANATE POTASSIUM SCH EACH: 875; 125 TABLET, FILM COATED ORAL at 07:53

## 2022-11-04 RX ADMIN — SUCRALFATE SCH GM: 1 TABLET ORAL at 12:56

## 2022-11-04 RX ADMIN — AMOXICILLIN AND CLAVULANATE POTASSIUM SCH EACH: 875; 125 TABLET, FILM COATED ORAL at 20:12

## 2022-11-04 RX ADMIN — IPRATROPIUM BROMIDE AND ALBUTEROL SULFATE PRN ML: .5; 3 SOLUTION RESPIRATORY (INHALATION) at 11:47

## 2022-11-04 NOTE — P.PN
Subjective


Progress Note Date: 11/04/22


Principal diagnosis: 





GI bleed





In f/u today pt reports 2 maroon stools since yesterday, no other bleeding to 

report, he is still SOB ambulating about 15ft.  No fever, chest pain.





Objective





- Vital Signs


Vital signs: 


                                   Vital Signs











Temp  97.8 F   11/04/22 07:58


 


Pulse  101 H  11/04/22 07:58


 


Resp  19   11/04/22 07:58


 


BP  109/57   11/04/22 07:58


 


Pulse Ox  97   11/04/22 07:58


 


FiO2      








                                 Intake & Output











 11/03/22 11/04/22 11/04/22





 18:59 06:59 18:59


 


Intake Total 1063 930 300


 


Output Total 275 200 


 


Balance 788 730 300


 


Weight 74.389 kg  


 


Intake:   


 


  Oral 716  300


 


  Blood Product 347 930 


 


    Platelet Pheresis Pas 347  





    Psoralen  Unit   





    [id number]   


 


    Rc Irr As1  Unit  310 





    [id number]   


 


    Rc Irr As1  Unit 0 310 





    [id number]   


 


Output:   


 


  Urine 275 200 


 


Other:   


 


  Voiding Method Urinal Urinal Toilet





   Bedside Commode





   Urinal


 


  # Voids 1  


 


  # Bowel Movements 1 2 














- Constitutional


General appearance: Present: cooperative, no acute distress, obese





- EENT


Eyes: Present: anicteric sclerae, EOMI, poor dentition


ENT: Present: hearing grossly normal





- Respiratory


Details: 





pt able to speak today without notable SOB





- Peripheral edema


  ** leg


Peripheral Edema: bilateral: None





- Integumentary


Integumentary: Present: pale





- Neurologic


Neurologic: Present: CNII-XII intact





- Musculoskeletal


Musculoskeletal: Present: generalized weakness, strength equal bilaterally





- Psychiatric


Psychiatric: Present: A&O x's 3, appropriate affect, intact judgment & insight





- Labs


CBC & Chem 7: 


                                 11/04/22 08:05





                                 11/03/22 06:52


Labs: 


                  Abnormal Lab Results - Last 24 Hours (Table)











  11/02/22 11/03/22 11/03/22 Range/Units





  20:25 11:38 16:30 


 


WBC     (3.8-10.6)  k/uL


 


RBC     (4.30-5.90)  m/uL


 


Hgb     (13.0-17.5)  gm/dL


 


Hct     (39.0-53.0)  %


 


RDW     (11.5-15.5)  %


 


Plt Count     (150-450)  k/uL


 


POC Glucose (mg/dL)   191 H  240 H  ()  mg/dL


 


Crossmatch  See Detail    














  11/03/22 11/03/22 11/04/22 Range/Units





  19:35 21:25 05:52 


 


WBC   0.3 L*   (3.8-10.6)  k/uL


 


RBC   2.15 L   (4.30-5.90)  m/uL


 


Hgb   7.2 L   (13.0-17.5)  gm/dL


 


Hct   20.0 L   (39.0-53.0)  %


 


RDW   18.3 H   (11.5-15.5)  %


 


Plt Count   37 L D   (150-450)  k/uL


 


POC Glucose (mg/dL)  254 H   154 H  ()  mg/dL


 


Crossmatch     














  11/04/22 Range/Units





  08:05 


 


WBC  0.5 L*  (3.8-10.6)  k/uL


 


RBC  2.41 L  (4.30-5.90)  m/uL


 


Hgb  7.9 L  (13.0-17.5)  gm/dL


 


Hct  22.0 L  (39.0-53.0)  %


 


RDW  18.5 H  (11.5-15.5)  %


 


Plt Count  28 L  (150-450)  k/uL


 


POC Glucose (mg/dL)   ()  mg/dL


 


Crossmatch   














Assessment and Plan


(1) GI bleed


Current Visit: Yes   Status: Acute   Priority: High   Code(s): K92.2 - 

GASTROINTESTINAL HEMORRHAGE, UNSPECIFIED   SNOMED Code(s): 51173125


   





(2) Pancytopenia due to antineoplastic chemotherapy


Current Visit: Yes   Status: Acute   Priority: High   Code(s): D61.810 - 

ANTINEOPLASTIC CHEMOTHERAPY INDUCED PANCYTOPENIA; T45.1X5A - ADVERSE EFFECT OF 

ANTINEOPLASTIC AND IMMUNOSUP DRUGS, INIT   SNOMED Code(s): 458084111064354


   





(3) Primary small cell malignant neoplasm of lung, stage 4


Current Visit: Yes   Status: Acute   Priority: High   Code(s): C34.90 - 

MALIGNANT NEOPLASM OF UNSP PART OF UNSP BRONCHUS OR LUNG   SNOMED Code(s): 

37999880411903


   


Plan: 





Pancytopenia secondary to recent treatment.  Patient has a long history of 

treatment this will impact his bone marrow is ability to respond in addition to 

treatment last week.  .  Transfuse for hemoglobin less than 7.  Pt has received 

3 units total since admit.  Hgb 7.9 today, no tranfusion.  Transfuse for 

platelet count less than 10,000-Patient received 2 units SDP, plt 28K today.  No

asa, NSAIDs, anticoagulation.  SCDs for DVT prophylaxis.  WBC 0.3.  G-CSF cont. 

CBC daily.  


GI bleed.  Patient had 2 bloody bowel movements since yesterday.  He has been 

seen by GI with recent endoscopy, no scopes planned at this time, cont observ

ation and supportive care.  


Shortness of breath.  Possibly related to opacities in the lung/pneumonia in 

conjunction with anemia, patient's hemoglobin tends to run between 9 and 10.  He

has been transfused, hemoglobin 7.9, he is noted to be more comfortable at rest 

today then yesterday.  Cont to encourag short distance ambulation with staff.


Patient just started zepzelca, CTA reports some improvement in tumor size.  

Would like to be able to continue patient on the same.  He is going to need tra

nsfusion support.  We'll likely add G-CSF as well.  Will see how patient does 

during hospital course.


Continue pain medications as prescribed from the office. Pain seems to be 

manageable at this time.

## 2022-11-04 NOTE — P.PN
Subjective


Progress Note Date: 11/04/22


H&P Date: 11/03/22


Chief Complaint: Rectal bleeding


This is a pleasant 62-year-old gentleman with past medical history of metastatic

lung disease/ primary small cell malignant neoplasm of lung, stage IV presented 

to the ER with Sunil rectal bleeding X 3, accompanied by lower abdominal 

cramping and exertional shortness of breath.  Reports some nausea, denies 

emesis.  Also reports extensive workup of abdominal pain and per oncology 

attributing it to chemo effect. Last chemotherapy approximately 1 week ago. 

Chest CTA reported : no evidence of pulmonary embolism.  Mediastinal left 

bronchial adenopathy with improvement in the left hilar mass compared to old 

exam.  Slightly decreased left upper lobe spiculated infiltrate extending from 

the left pulmonary hilum to the left lung apex,compared to old exam.  Improved 

encasement of the left lower lobe pulmonary artery with tumor mass and artery 

narrowing compared to old exam.COPD. Increased patchy bilateral reticular 

pulmonary interstitial infiltrates compared to prior exam.  Increased small left

pleural effusion compared to prior exam.  Pericardial tumor masses not 

significantly different than last exam.  On admission :WBC 0.2 hemoglobin 6.5 

hematocrit 18.9 platelet count 5 INR 1.0 d-dimer 1.76 sodium 136 potassium 4.4 

BUN 12 creatinine 0.63 glucose 115, bilirubin 0.4 AST 23 ALT 22 alk phos 60. 

Repeat labs today WBC 0.1 hemoglobin 7.5 hematocrit 22 platelet count 7. 

Received 1 unit of packed red blood cell transfusion as well as 1 unit of 

platelets.  Denies any further rectal bleeding since admission.  Denies 

shortness of breath at rest.  Denies chest pain, palpitations.








11/04/2022 maintained on IV fluid hydration, Zarxio,PPI, carafate .reports dark 

maroon stooling 2 last night.  Denies any further bleeding this morning.  

Denies abdominal cramping, abdominal pain.  Consuming 25-75% with no nausea 

vomiting. Received a total of 3 units packed RBCs, 2 units of platelets.  WBC 

increased to 0.5, hemoglobin 7.9, platelets 28 .Afebrile.  Denies chest pain, 

palpitations or shortness of breath.  Complains of exertional shortness of 

breath.











Objective





- Vital Signs


Vital signs: 


                                   Vital Signs











Temp  97.8 F   11/04/22 07:58


 


Pulse  100   11/04/22 12:01


 


Resp  19   11/04/22 07:58


 


BP  102/58   11/04/22 11:09


 


Pulse Ox  97   11/04/22 11:09


 


FiO2      








                                 Intake & Output











 11/03/22 11/04/22 11/04/22





 18:59 06:59 18:59


 


Intake Total 1063 930 300


 


Output Total 275 200 150


 


Balance 788 730 150


 


Weight 74.389 kg  74.389 kg


 


Intake:   


 


  Oral 716  300


 


  Blood Product 347 930 


 


    Platelet Pheresis Pas 347  





    Psoralen  Unit   





    [id number]   


 


    Rc Irr As1  Unit  310 





    [id number]   


 


    Rc Irr As1  Unit 0 310 





    [id number]   


 


Output:   


 


  Urine 275 200 150


 


Other:   


 


  Voiding Method Urinal Urinal Toilet





   Bedside Commode





   Urinal


 


  # Voids 1  1


 


  # Bowel Movements 1 2 














- Exam





- Exam


PHYSICAL EXAM:


VITAL SIGNS: [As above]


GENERAL: Alert and oriented 3, Sitting up in bed,no acute distress


HEENT:  Conjunctivae normal. eyes normal. MMM.


NECK: Supple, No JVD.


CARDIOVASCULAR:  S1, S2 regular, mild tachycardia. No murmur.


RESPIRATION: Unlabored Breath sounds diminished in the bases. No rhonchi or 

crackles. 


ABDOMEN:  Soft, nontender, No guarding.Bowel sounds heard.


LEGS:  No edema. no swelling.


NERVOUS SYSTEM:  Cranial N 2-12 grossly normal.No focal deficits. Strength and 

sensation grossly intact.


Skin: Warm and dry, no rash 











- Labs


CBC & Chem 7: 


                                 11/04/22 08:05





                                 11/03/22 06:52


Labs: 


                  Abnormal Lab Results - Last 24 Hours (Table)











  11/02/22 11/03/22 11/03/22 Range/Units





  20:25 16:30 19:35 


 


WBC     (3.8-10.6)  k/uL


 


RBC     (4.30-5.90)  m/uL


 


Hgb     (13.0-17.5)  gm/dL


 


Hct     (39.0-53.0)  %


 


RDW     (11.5-15.5)  %


 


Plt Count     (150-450)  k/uL


 


POC Glucose (mg/dL)   240 H  254 H  ()  mg/dL


 


Crossmatch  See Detail    














  11/03/22 11/04/22 11/04/22 Range/Units





  21:25 05:52 08:05 


 


WBC  0.3 L*   0.5 L*  (3.8-10.6)  k/uL


 


RBC  2.15 L   2.41 L  (4.30-5.90)  m/uL


 


Hgb  7.2 L   7.9 L  (13.0-17.5)  gm/dL


 


Hct  20.0 L   22.0 L  (39.0-53.0)  %


 


RDW  18.3 H   18.5 H  (11.5-15.5)  %


 


Plt Count  37 L D   28 L  (150-450)  k/uL


 


POC Glucose (mg/dL)   154 H   ()  mg/dL


 


Crossmatch     














  11/04/22 Range/Units





  11:48 


 


WBC   (3.8-10.6)  k/uL


 


RBC   (4.30-5.90)  m/uL


 


Hgb   (13.0-17.5)  gm/dL


 


Hct   (39.0-53.0)  %


 


RDW   (11.5-15.5)  %


 


Plt Count   (150-450)  k/uL


 


POC Glucose (mg/dL)  113 H  ()  mg/dL


 


Crossmatch   














Assessment and Plan


Assessment: 


Acute GI bleed, status post transfusion of packed RBCs and platelets.  No 

endoscopy recommended per GI.


Pancytopenia secondary to recent treatment,Last received chemotherapy 1 week ago


Chronic abdominal, back pain due to metastatic lung disease in a patient with 

history of primary small cell malignant neoplasm of lung, stage IV.  Fentanyl 

patch dose increased last week with improvement in pain.


History of Adrenal mass likely secondary to metastatic lesion.


COPD


GERD


Anxiety/depression


Previous history of smoking


Adrenal insufficiency currently on Cortef at home.

















Plan: Continue on current medication regime ,monitoring and symptomatic 

treatment.  Neutropenic precautions. Maintain zaxrio,PPI, Carafate,empiric 

Augmentin and doxycycline. Transfusion parameters as per oncology. Close 

monitoring of coags.with repeat labs ordered for a.m. PT/OT. Prognosis guarded 

given multiple complex medical issues.














The impression and plan of care has been dictated as directed.





:


I performed a history and examination of this patient,  discussed the same with 

the dictator.  I agree with the dictator's note ,documented as a scribe.  Any 

additional findings or plans will be noted.

## 2022-11-04 NOTE — P.PN
Subjective


Progress Note Date: 11/04/22


Principal diagnosis: 





GI bleed





This is a pleasant 62-year-old male with a history of metastatic lung cancer 

with metastasis to the stomach and neck diagnosed in June 2021.  He presented to

the emergency department with shortness of breath for the past 2 days along with

3 episodes of bloody stools.  He has undergone chemo medial therapy in the past 

and is currently under chemotherapy treatment through [facility].  He 

states that he underwent his first treatment within the last month.  He states 

that he came into the hospital because he had 3 medium red bloody bowel mo

vements yesterday.  States he was having some lower abdominal cramping followed 

by the bleeding.  He states he had some mild nausea but no vomiting.  Denies any

previous history of GI bleed.  Denies any NSAID use or anticoagulation.  He 

states that he is on Carafate and Protonix for history of GERD.  Patient 

recently underwent EGD and colonoscopy 10/6/22 with Dr. Mcbride for abdominal 

pain.  EGD found a mild chronic gastritis and colonoscopy.  Osseous had any 

bleeding today.  Last episode was yesterday evening.  States he has chronic 

abdominal pain, and improved with his Carafate and Protonix.


He had a CT angiogram of the chest that reported no evidence of pulmonary 

embolism.  Mediastinal left bronchial adenopathy with improvement in the left 

hilar mass compared to old exam.  Left upper lobe spiculated infiltrate 

extending from the left pulmonary hilum to the left lung apex which is slightly 

decreased compared to old exam.  Encasement of the left lower lobe pulmonary 

artery with tumor mass and artery narrowing on old exam that is improved on 

today's exam.  COPD.  Patchy bilateral reticular pulmonary interstitial 

infiltrates which are increased compared to old exam.  Small left pleural 

effusion which is increased compared to old exam.  Pericardial tumor masses that

significantly different than last exam. 





11/04/2022: Patient seen and examined today as a follow-up.  He reports that he 

had 2 dark maroon stools yesterday.  No further bleeding since.  Denies any 

associated abdominal pain, cramping, nausea or vomiting.  Patient did receive a 

total now of 3 units of blood and 2 units of platelets.  Repeat labs: WBC 0.5 

hemoglobin 7.9 hematocrit 22 platelet count 28,000





Objective





- Vital Signs


Vital signs: 


                                   Vital Signs











Temp  97.9 F   11/04/22 04:00


 


Pulse  95   11/04/22 04:00


 


Resp  18   11/04/22 04:00


 


BP  112/62   11/04/22 04:00


 


Pulse Ox  96   11/04/22 04:00


 


FiO2      








                                 Intake & Output











 11/03/22 11/04/22 11/04/22





 18:59 06:59 18:59


 


Intake Total 1063 930 


 


Output Total 275 200 


 


Balance 788 730 


 


Weight 74.389 kg  


 


Intake:   


 


  Oral 716  


 


  Blood Product 347 930 


 


    Platelet Pheresis Pas 347  





    Psoralen  Unit   





    [id number]   


 


    Rc Irr As1  Unit  310 





    [id number]   


 


    Rc Irr As1  Unit 0 310 





    [id number]   


 


Output:   


 


  Urine 275 200 


 


Other:   


 


  Voiding Method Urinal Urinal 


 


  # Voids 1  


 


  # Bowel Movements 1 2 














- Exam





General appearance: The patient is alert, oriented, appears in no acute 

distress.


HET: Head is normocephalic and atraumatic.  Conjunctiva pink.  Sclera anicteric.


Neck: Supple without lymphadenopathy.  


Abdomen: Soft,  nontender, nondistended with  bowel sounds.  No guarding or 

rigidity.


Extremities: Normal skin color and turgor.  No pedal edema


Skin: No rashes, no jaundice


Neurological: No focal deficits.  Alert and oriented.





- Labs


CBC & Chem 7: 


                                 11/04/22 08:05





                                 11/03/22 06:52


Labs: 


                  Abnormal Lab Results - Last 24 Hours (Table)











  11/02/22 11/03/22 11/03/22 Range/Units





  20:25 06:52 06:52 


 


WBC   0.1 L*   (3.8-10.6)  k/uL


 


RBC   2.22 L   (4.30-5.90)  m/uL


 


Hgb   7.5 L   (13.0-17.5)  gm/dL


 


Hct   22.6 L   (39.0-53.0)  %


 


MCV   101.6 H   (80.0-100.0)  fL


 


RDW   16.8 H   (11.5-15.5)  %


 


Plt Count   7 L*   (150-450)  k/uL


 


Chloride    109 H  ()  mmol/L


 


Carbon Dioxide    20 L  (22-30)  mmol/L


 


Creatinine    0.59 L  (0.66-1.25)  mg/dL


 


Glucose    108 H  (74-99)  mg/dL


 


POC Glucose (mg/dL)     ()  mg/dL


 


Calcium    7.1 L  (8.4-10.2)  mg/dL


 


Crossmatch  See Detail    














  11/03/22 11/03/22 11/03/22 Range/Units





  11:38 16:30 19:35 


 


WBC     (3.8-10.6)  k/uL


 


RBC     (4.30-5.90)  m/uL


 


Hgb     (13.0-17.5)  gm/dL


 


Hct     (39.0-53.0)  %


 


MCV     (80.0-100.0)  fL


 


RDW     (11.5-15.5)  %


 


Plt Count     (150-450)  k/uL


 


Chloride     ()  mmol/L


 


Carbon Dioxide     (22-30)  mmol/L


 


Creatinine     (0.66-1.25)  mg/dL


 


Glucose     (74-99)  mg/dL


 


POC Glucose (mg/dL)  191 H  240 H  254 H  ()  mg/dL


 


Calcium     (8.4-10.2)  mg/dL


 


Crossmatch     














  11/03/22 11/04/22 Range/Units





  21:25 05:52 


 


WBC  0.3 L*   (3.8-10.6)  k/uL


 


RBC  2.15 L   (4.30-5.90)  m/uL


 


Hgb  7.2 L   (13.0-17.5)  gm/dL


 


Hct  20.0 L   (39.0-53.0)  %


 


MCV    (80.0-100.0)  fL


 


RDW  18.3 H   (11.5-15.5)  %


 


Plt Count  37 L D   (150-450)  k/uL


 


Chloride    ()  mmol/L


 


Carbon Dioxide    (22-30)  mmol/L


 


Creatinine    (0.66-1.25)  mg/dL


 


Glucose    (74-99)  mg/dL


 


POC Glucose (mg/dL)   154 H  ()  mg/dL


 


Calcium    (8.4-10.2)  mg/dL


 


Crossmatch    














Assessment and Plan


(1) GI bleed


Narrative/Plan: 


History 2-year-old male with history of low malignant lung cancer who presented 

to the emergency department with shortness of breath and 3 episodes of rectal 

bleeding.  Patient was noted to be thrombocytopenic with platelet count of 5000 

on admission and hemoglobin is 6.5.  He has received 1 unit of breath blood 

cells as well as platelets.  No further bleeding.  No previous history of GI 

bleed.  Underwent EGD colonoscopy 10/26/2022.  Colonoscopy with findings of 

lipoma, benign finding.  And EGD with findings of mild gastritis.  Unclear 

etiology at this time of rectal bleeding.  In light of current platelet count 

will taper her any endoscopic evaluation at this time.  Await further 

recommendations from hematology


Current Visit: Yes   Status: Acute   Code(s): K92.2 - GASTROINTESTINAL 

HEMORRHAGE, UNSPECIFIED   SNOMED Code(s): 50513916


   





(2) Pancytopenia


Narrative/Plan: 


Hematology following closely, continue with the recommendations.


Current Visit: Yes   Status: Acute   Code(s): D61.818 - OTHER PANCYTOPENIA   

SNOMED Code(s): 058576939


   





(3) Metastatic lung cancer (metastasis from lung to other site)


Current Visit: No   Status: Acute   Code(s): C34.90 - MALIGNANT NEOPLASM OF UNSP

PART OF UNSP BRONCHUS OR LUNG   SNOMED Code(s): 50701165


   


Plan: 





1.  Continue symptomatic and supportive care


2.  Daily CBC, transfuse for hemoglobin less than 7


3.  Continue with recommendations from hematology


4.  Protonix 40 mg twice a day 


5.  Continue with Carafate 


6.  No plans on endoscopic evaluation at this time 





Thank you for allowing us to participate in the care of the patient, the GI 

service will sign off, gastroenterology will not be available at the hospital 

this weekend and through next week.  If further evaluation by gastroenterology 

is required the patient will need transfer as per the primary team's discretion.










Dr. KATIE Stout


I agree with the dictator's note, documented as a scribe by Mily BOOTH.

## 2022-11-05 LAB
ANION GAP SERPL CALC-SCNC: 4 MMOL/L
BUN SERPL-SCNC: 6 MG/DL (ref 9–20)
CALCIUM SPEC-MCNC: 6.9 MG/DL (ref 8.4–10.2)
CHLORIDE SERPL-SCNC: 110 MMOL/L (ref 98–107)
CO2 SERPL-SCNC: 19 MMOL/L (ref 22–30)
ERYTHROCYTE [DISTWIDTH] IN BLOOD BY AUTOMATED COUNT: 2.55 M/UL (ref 4.3–5.9)
ERYTHROCYTE [DISTWIDTH] IN BLOOD: 18.4 % (ref 11.5–15.5)
GLUCOSE BLD-MCNC: 110 MG/DL (ref 70–110)
GLUCOSE BLD-MCNC: 110 MG/DL (ref 70–110)
GLUCOSE BLD-MCNC: 239 MG/DL (ref 70–110)
GLUCOSE BLD-MCNC: 91 MG/DL (ref 70–110)
GLUCOSE SERPL-MCNC: 179 MG/DL (ref 74–99)
HCT VFR BLD AUTO: 23.5 % (ref 39–53)
HGB BLD-MCNC: 8.5 GM/DL (ref 13–17.5)
MCH RBC QN AUTO: 33.4 PG (ref 25–35)
MCHC RBC AUTO-ENTMCNC: 36.2 G/DL (ref 31–37)
MCV RBC AUTO: 92.4 FL (ref 80–100)
PH UR: 5 [PH] (ref 5–8)
PLATELET # BLD AUTO: 23 K/UL (ref 150–450)
POTASSIUM SERPL-SCNC: 4.1 MMOL/L (ref 3.5–5.1)
SODIUM SERPL-SCNC: 133 MMOL/L (ref 137–145)
SP GR UR: 1.01 (ref 1–1.03)
UROBILINOGEN UR QL STRIP: <2 MG/DL (ref ?–2)
WBC # BLD AUTO: 0.8 K/UL (ref 3.8–10.6)

## 2022-11-05 RX ADMIN — SUCRALFATE SCH: 1 TABLET ORAL at 10:26

## 2022-11-05 RX ADMIN — DOXYCYCLINE SCH: 100 CAPSULE ORAL at 10:26

## 2022-11-05 RX ADMIN — INSULIN ASPART SCH UNIT: 100 INJECTION, SOLUTION INTRAVENOUS; SUBCUTANEOUS at 21:41

## 2022-11-05 RX ADMIN — CEFAZOLIN SCH: 330 INJECTION, POWDER, FOR SOLUTION INTRAMUSCULAR; INTRAVENOUS at 10:25

## 2022-11-05 RX ADMIN — AMOXICILLIN AND CLAVULANATE POTASSIUM SCH: 875; 125 TABLET, FILM COATED ORAL at 10:26

## 2022-11-05 RX ADMIN — SUCRALFATE SCH GM: 1 TABLET ORAL at 20:10

## 2022-11-05 RX ADMIN — IPRATROPIUM BROMIDE AND ALBUTEROL SULFATE PRN ML: .5; 3 SOLUTION RESPIRATORY (INHALATION) at 12:50

## 2022-11-05 RX ADMIN — METOPROLOL TARTRATE SCH: 25 TABLET, FILM COATED ORAL at 10:26

## 2022-11-05 RX ADMIN — MORPHINE SULFATE PRN MG: 15 TABLET ORAL at 17:56

## 2022-11-05 RX ADMIN — HYDROCORTISONE SODIUM SUCCINATE SCH MG: 100 INJECTION, POWDER, FOR SOLUTION INTRAMUSCULAR; INTRAVENOUS at 16:13

## 2022-11-05 RX ADMIN — INSULIN ASPART SCH: 100 INJECTION, SOLUTION INTRAVENOUS; SUBCUTANEOUS at 12:07

## 2022-11-05 RX ADMIN — OXYCODONE HYDROCHLORIDE AND ACETAMINOPHEN PRN EACH: 10; 325 TABLET ORAL at 14:20

## 2022-11-05 RX ADMIN — GABAPENTIN SCH MG: 400 CAPSULE ORAL at 20:10

## 2022-11-05 RX ADMIN — PANTOPRAZOLE SODIUM SCH: 40 INJECTION, POWDER, FOR SOLUTION INTRAVENOUS at 10:27

## 2022-11-05 RX ADMIN — FILGRASTIM-SNDZ SCH MCG: 480 INJECTION, SOLUTION INTRAVENOUS; SUBCUTANEOUS at 17:23

## 2022-11-05 RX ADMIN — INSULIN ASPART SCH: 100 INJECTION, SOLUTION INTRAVENOUS; SUBCUTANEOUS at 16:59

## 2022-11-05 RX ADMIN — IPRATROPIUM BROMIDE AND ALBUTEROL SULFATE PRN ML: .5; 3 SOLUTION RESPIRATORY (INHALATION) at 09:21

## 2022-11-05 RX ADMIN — BUDESONIDE SCH MG: 0.25 SUSPENSION RESPIRATORY (INHALATION) at 21:31

## 2022-11-05 RX ADMIN — IPRATROPIUM BROMIDE AND ALBUTEROL SULFATE PRN ML: .5; 3 SOLUTION RESPIRATORY (INHALATION) at 21:31

## 2022-11-05 RX ADMIN — IPRATROPIUM BROMIDE AND ALBUTEROL SULFATE PRN ML: .5; 3 SOLUTION RESPIRATORY (INHALATION) at 16:30

## 2022-11-05 RX ADMIN — HYDROCORTISONE SODIUM SUCCINATE SCH MG: 100 INJECTION, POWDER, FOR SOLUTION INTRAMUSCULAR; INTRAVENOUS at 23:10

## 2022-11-05 RX ADMIN — SUCRALFATE SCH GM: 1 TABLET ORAL at 17:12

## 2022-11-05 RX ADMIN — INSULIN ASPART SCH: 100 INJECTION, SOLUTION INTRAVENOUS; SUBCUTANEOUS at 10:25

## 2022-11-05 RX ADMIN — CEFAZOLIN SCH MLS/HR: 330 INJECTION, POWDER, FOR SOLUTION INTRAMUSCULAR; INTRAVENOUS at 12:07

## 2022-11-05 RX ADMIN — SUCRALFATE SCH GM: 1 TABLET ORAL at 12:06

## 2022-11-05 RX ADMIN — BUDESONIDE SCH MG: 0.25 SUSPENSION RESPIRATORY (INHALATION) at 09:22

## 2022-11-05 RX ADMIN — METOPROLOL TARTRATE SCH MG: 25 TABLET, FILM COATED ORAL at 20:10

## 2022-11-06 LAB
ANION GAP SERPL CALC-SCNC: 4 MMOL/L
BUN SERPL-SCNC: 6 MG/DL (ref 9–20)
CALCIUM SPEC-MCNC: 7.1 MG/DL (ref 8.4–10.2)
CHLORIDE SERPL-SCNC: 110 MMOL/L (ref 98–107)
CO2 SERPL-SCNC: 19 MMOL/L (ref 22–30)
ERYTHROCYTE [DISTWIDTH] IN BLOOD BY AUTOMATED COUNT: 1.88 M/UL (ref 4.3–5.9)
ERYTHROCYTE [DISTWIDTH] IN BLOOD: 18 % (ref 11.5–15.5)
GLUCOSE BLD-MCNC: 138 MG/DL (ref 70–110)
GLUCOSE BLD-MCNC: 140 MG/DL (ref 70–110)
GLUCOSE BLD-MCNC: 145 MG/DL (ref 70–110)
GLUCOSE BLD-MCNC: 156 MG/DL (ref 70–110)
GLUCOSE BLD-MCNC: 192 MG/DL (ref 70–110)
GLUCOSE SERPL-MCNC: 139 MG/DL (ref 74–99)
HCT VFR BLD AUTO: 17.8 % (ref 39–53)
HGB BLD-MCNC: 6 GM/DL (ref 13–17.5)
MCH RBC QN AUTO: 32.2 PG (ref 25–35)
MCHC RBC AUTO-ENTMCNC: 33.9 G/DL (ref 31–37)
MCV RBC AUTO: 95 FL (ref 80–100)
PLATELET # BLD AUTO: 8 K/UL (ref 150–450)
POTASSIUM SERPL-SCNC: 3.6 MMOL/L (ref 3.5–5.1)
SODIUM SERPL-SCNC: 133 MMOL/L (ref 137–145)
WBC # BLD AUTO: 0.9 K/UL (ref 3.8–10.6)

## 2022-11-06 RX ADMIN — HYDROCORTISONE SODIUM SUCCINATE SCH MG: 100 INJECTION, POWDER, FOR SOLUTION INTRAMUSCULAR; INTRAVENOUS at 23:39

## 2022-11-06 RX ADMIN — SUCRALFATE SCH GM: 1 TABLET ORAL at 20:20

## 2022-11-06 RX ADMIN — CEFAZOLIN SCH: 330 INJECTION, POWDER, FOR SOLUTION INTRAMUSCULAR; INTRAVENOUS at 01:44

## 2022-11-06 RX ADMIN — BUDESONIDE SCH MG: 0.25 SUSPENSION RESPIRATORY (INHALATION) at 19:15

## 2022-11-06 RX ADMIN — Medication PRN MG: at 20:20

## 2022-11-06 RX ADMIN — IPRATROPIUM BROMIDE AND ALBUTEROL SULFATE PRN ML: .5; 3 SOLUTION RESPIRATORY (INHALATION) at 15:18

## 2022-11-06 RX ADMIN — SUCRALFATE SCH GM: 1 TABLET ORAL at 17:34

## 2022-11-06 RX ADMIN — IPRATROPIUM BROMIDE AND ALBUTEROL SULFATE PRN ML: .5; 3 SOLUTION RESPIRATORY (INHALATION) at 19:15

## 2022-11-06 RX ADMIN — FILGRASTIM-SNDZ SCH MCG: 480 INJECTION, SOLUTION INTRAVENOUS; SUBCUTANEOUS at 17:49

## 2022-11-06 RX ADMIN — BUDESONIDE SCH MG: 0.25 SUSPENSION RESPIRATORY (INHALATION) at 09:07

## 2022-11-06 RX ADMIN — GABAPENTIN SCH MG: 400 CAPSULE ORAL at 20:20

## 2022-11-06 RX ADMIN — METOPROLOL TARTRATE SCH MG: 25 TABLET, FILM COATED ORAL at 20:19

## 2022-11-06 RX ADMIN — IPRATROPIUM BROMIDE AND ALBUTEROL SULFATE PRN ML: .5; 3 SOLUTION RESPIRATORY (INHALATION) at 12:11

## 2022-11-06 RX ADMIN — PANTOPRAZOLE SODIUM SCH MG: 40 INJECTION, POWDER, FOR SOLUTION INTRAVENOUS at 08:01

## 2022-11-06 RX ADMIN — OXYCODONE HYDROCHLORIDE AND ACETAMINOPHEN PRN EACH: 10; 325 TABLET ORAL at 19:41

## 2022-11-06 RX ADMIN — GABAPENTIN SCH MG: 400 CAPSULE ORAL at 08:03

## 2022-11-06 RX ADMIN — HYDROCORTISONE SODIUM SUCCINATE SCH MG: 100 INJECTION, POWDER, FOR SOLUTION INTRAMUSCULAR; INTRAVENOUS at 08:01

## 2022-11-06 RX ADMIN — HYDROCORTISONE SODIUM SUCCINATE SCH MG: 100 INJECTION, POWDER, FOR SOLUTION INTRAMUSCULAR; INTRAVENOUS at 16:26

## 2022-11-06 RX ADMIN — MORPHINE SULFATE PRN MG: 15 TABLET ORAL at 21:04

## 2022-11-06 RX ADMIN — IPRATROPIUM BROMIDE AND ALBUTEROL SULFATE PRN ML: .5; 3 SOLUTION RESPIRATORY (INHALATION) at 09:07

## 2022-11-06 RX ADMIN — CEFAZOLIN SCH: 330 INJECTION, POWDER, FOR SOLUTION INTRAMUSCULAR; INTRAVENOUS at 17:21

## 2022-11-06 RX ADMIN — INSULIN ASPART SCH: 100 INJECTION, SOLUTION INTRAVENOUS; SUBCUTANEOUS at 20:13

## 2022-11-06 RX ADMIN — METOPROLOL TARTRATE SCH MG: 25 TABLET, FILM COATED ORAL at 08:03

## 2022-11-06 RX ADMIN — INSULIN ASPART SCH UNIT: 100 INJECTION, SOLUTION INTRAVENOUS; SUBCUTANEOUS at 06:29

## 2022-11-06 RX ADMIN — INSULIN ASPART SCH: 100 INJECTION, SOLUTION INTRAVENOUS; SUBCUTANEOUS at 13:02

## 2022-11-06 RX ADMIN — INSULIN ASPART SCH: 100 INJECTION, SOLUTION INTRAVENOUS; SUBCUTANEOUS at 17:22

## 2022-11-06 RX ADMIN — MORPHINE SULFATE PRN MG: 15 TABLET ORAL at 16:24

## 2022-11-06 RX ADMIN — SUCRALFATE SCH GM: 1 TABLET ORAL at 13:30

## 2022-11-06 RX ADMIN — SUCRALFATE SCH GM: 1 TABLET ORAL at 06:29

## 2022-11-06 NOTE — P.PN
Subjective


Progress Note Date: 11/06/22


Principal diagnosis: 





Pancytopenia due to chemotherapy


GIB








Pt's CBC was stable yesterday however today he had increased bloody stools with 

significant drop of Hgb to 6 and plt from 23 to 8.  Transferred to ICU.








Objective





- Vital Signs


Vital signs: 


                                   Vital Signs











Temp  97.4 F L  11/06/22 07:58


 


Pulse  98   11/06/22 12:23


 


Resp  20   11/06/22 07:58


 


BP  93/51   11/06/22 07:58


 


Pulse Ox  98   11/06/22 09:10


 


FiO2      








                                 Intake & Output











 11/05/22 11/06/22 11/06/22





 19:59 06:59 18:59


 


Intake Total   240


 


Output Total   


 


Balance   240


 


Intake:   


 


  Oral   240


 


Output:   


 


  Urine   


 


  Stool   


 


  Urine/Stool Mix   


 


Other:   


 


  Voiding Method   Toilet





   Bedside Commode





   Urinal


 


  # Voids   1


 


  # Bowel Movements   1














- Exam





Gen.: In no acute distress


HEENT: Conjunctival pallor


Neck: Supple


Lungs: No respiratory distress.


Heart: Tachycardic


Neuro: Alert and oriented 3.


Skin: Generalized pallor.


Psych: Appropriate affect.








- Labs


CBC & Chem 7: 


                                 11/06/22 11:30





                                 11/06/22 11:30


Labs: 


                  Abnormal Lab Results - Last 24 Hours (Table)











  11/05/22 11/05/22 11/05/22 Range/Units





  08:25 19:01 21:37 


 


WBC  0.8 L*    (3.8-10.6)  k/uL


 


RBC  2.55 L    (4.30-5.90)  m/uL


 


Hgb  8.5 L    (13.0-17.5)  gm/dL


 


Hct  23.5 L    (39.0-53.0)  %


 


RDW  18.4 H    (11.5-15.5)  %


 


Plt Count  23 L    (150-450)  k/uL


 


Sodium   133 L   (137-145)  mmol/L


 


Chloride   110 H   ()  mmol/L


 


Carbon Dioxide   19 L   (22-30)  mmol/L


 


BUN   6 L   (9-20)  mg/dL


 


Creatinine   0.54 L   (0.66-1.25)  mg/dL


 


Glucose   179 H   (74-99)  mg/dL


 


POC Glucose (mg/dL)    239 H  ()  mg/dL


 


Calcium   6.9 L   (8.4-10.2)  mg/dL


 


Crossmatch     














  11/06/22 11/06/22 11/06/22 Range/Units





  06:03 11:30 11:30 


 


WBC   0.9 L*   (3.8-10.6)  k/uL


 


RBC   1.88 L   (4.30-5.90)  m/uL


 


Hgb   6.0 L* D   (13.0-17.5)  gm/dL


 


Hct   17.8 L*   (39.0-53.0)  %


 


RDW   18.0 H   (11.5-15.5)  %


 


Plt Count   8 L* D   (150-450)  k/uL


 


Sodium    133 L  (137-145)  mmol/L


 


Chloride    110 H  ()  mmol/L


 


Carbon Dioxide    19 L  (22-30)  mmol/L


 


BUN    6 L  (9-20)  mg/dL


 


Creatinine    0.49 L  (0.66-1.25)  mg/dL


 


Glucose    139 H  (74-99)  mg/dL


 


POC Glucose (mg/dL)  192 H    ()  mg/dL


 


Calcium    7.1 L  (8.4-10.2)  mg/dL


 


Crossmatch     














  11/06/22 11/06/22 11/06/22 Range/Units





  11:56 12:35 12:47 


 


WBC     (3.8-10.6)  k/uL


 


RBC     (4.30-5.90)  m/uL


 


Hgb     (13.0-17.5)  gm/dL


 


Hct     (39.0-53.0)  %


 


RDW     (11.5-15.5)  %


 


Plt Count     (150-450)  k/uL


 


Sodium     (137-145)  mmol/L


 


Chloride     ()  mmol/L


 


Carbon Dioxide     (22-30)  mmol/L


 


BUN     (9-20)  mg/dL


 


Creatinine     (0.66-1.25)  mg/dL


 


Glucose     (74-99)  mg/dL


 


POC Glucose (mg/dL)  156 H   145 H  ()  mg/dL


 


Calcium     (8.4-10.2)  mg/dL


 


Crossmatch   See Detail   














Assessment and Plan


Assessment: 





1. Pneumonia


2. GI bleed


3. Pancytopenia due to chemotherapy


4. Worsening thrombocytopenia and anemia due to GIB





Plan: 





Mr. Erickson is a very pleasant 63 yo male with history of SCLC on chemotherapy who 

is here for SOB due to pneumonia.  Found to be pancytopenic due to chemotherapy.

 Course complicated by maroon colored stools which today turned to bloodly 

stools.  Also with acute drop of Hgb and plt today.  Transferred to ICU with 

plan of transfer to OSH, possibly [facility].





Continue with supportive transfusion to maintain plt >50 while he has active 

bleeding.  Needs GI evaluation.  Transfuse Hgb to keep >7,  Pls transfuse 2 

units plt and 2 units pRBC now, and repeat CBC once this is complete.  Would 

closely monitor CBC q8h for now.  Hold all antiplt/anticoagulation therapy 

(currently not on anything).  Will continue to follow pt with you while here.  

Agree with transfer to OSH since GI not available here now.  Discussed with pt 

and nursing staff.

## 2022-11-06 NOTE — PN
PROGRESS NOTE



DATE OF SERVICE:  11/05/2022



HISTORY OF PRESENT ILLNESS:

This is a 62-year-old gentleman who was admitted with acute GI bleed, also had 3
units

of RBC transfusions and 2 units of platelet transfusions at this time.  The 
patient's

hemoglobin is 7.9, white count is 0.5, and platelets are 28.  The patient has 
continued

melenic stools.



PAST MEDICAL HISTORY:

Reviewed.



REVIEW OF SYSTEMS:

A 14-point review is negative as mentioned earlier.



CURRENT MEDICATIONS:

Reviewed include Pulmicort, dose and rest of medications also noted.



PHYSICAL EXAMINATION:

VITAL SIGNS:  Pulse is 104, blood pressure 89/52, and respirations 25.

HEENT:  Conjunctivae normal.

CARDIOVASCULAR:  S1, S2 muffled.

RESPIRATIONS:  Decreased at the bases, few scattered rhonchi.

ABDOMEN:  Soft, nontender.

LEGS:  No edema.



LABS:

White count 0.5, hemoglobin 7.9, and hematocrit 28 as mentioned earlier.  
Yesterday's

and today's labs are not available.



ASSESSMENT:

1. Acute gastrointestinal bleed, recent EGD showed mild gastritis.

2. Thrombocytopenia and leukopenia secondary to chemotherapy.

3. Lung cancer stage IV.

4. Chronic obstructive pulmonary disease.

5. Multiple medical issues.



RECOMMENDATIONS:

This 62-year-old gentleman presented with multiple complex medical issues, we 
will

monitor the patient closely.  Repeat hemoglobin. Continue with rest of 
medications

NSAIDs.  Also recommended to stop doxycycline and Augmentin, we will initiate

parenteral antibiotic, check C difficile.  Guarded prognosis because of multiple

complex medical issues.  Further recommendations to follow.  See orders for 
further

details.





MMODL / IJN: [id number] / Job#: [id number]

JOLEEN

## 2022-11-07 LAB
ANION GAP SERPL CALC-SCNC: 2 MMOL/L
BASOPHILS # BLD MANUAL: 0.02 K/UL (ref 0–0.2)
BUN SERPL-SCNC: 7 MG/DL (ref 9–20)
CALCIUM SPEC-MCNC: 7.5 MG/DL (ref 8.4–10.2)
CELLS COUNTED: 100
CHLORIDE SERPL-SCNC: 110 MMOL/L (ref 98–107)
CO2 SERPL-SCNC: 23 MMOL/L (ref 22–30)
ERYTHROCYTE [DISTWIDTH] IN BLOOD BY AUTOMATED COUNT: 2.75 M/UL (ref 4.3–5.9)
ERYTHROCYTE [DISTWIDTH] IN BLOOD BY AUTOMATED COUNT: 2.78 M/UL (ref 4.3–5.9)
ERYTHROCYTE [DISTWIDTH] IN BLOOD BY AUTOMATED COUNT: 2.86 M/UL (ref 4.3–5.9)
ERYTHROCYTE [DISTWIDTH] IN BLOOD BY AUTOMATED COUNT: 2.93 M/UL (ref 4.3–5.9)
ERYTHROCYTE [DISTWIDTH] IN BLOOD: 18.2 % (ref 11.5–15.5)
ERYTHROCYTE [DISTWIDTH] IN BLOOD: 18.5 % (ref 11.5–15.5)
ERYTHROCYTE [DISTWIDTH] IN BLOOD: 18.5 % (ref 11.5–15.5)
ERYTHROCYTE [DISTWIDTH] IN BLOOD: 18.6 % (ref 11.5–15.5)
GLUCOSE BLD-MCNC: 124 MG/DL (ref 70–110)
GLUCOSE BLD-MCNC: 155 MG/DL (ref 70–110)
GLUCOSE BLD-MCNC: 183 MG/DL (ref 70–110)
GLUCOSE BLD-MCNC: 214 MG/DL (ref 70–110)
GLUCOSE SERPL-MCNC: 133 MG/DL (ref 74–99)
HCT VFR BLD AUTO: 23.9 % (ref 39–53)
HCT VFR BLD AUTO: 24.2 % (ref 39–53)
HCT VFR BLD AUTO: 25.9 % (ref 39–53)
HCT VFR BLD AUTO: 25.9 % (ref 39–53)
HGB BLD-MCNC: 8.2 GM/DL (ref 13–17.5)
HGB BLD-MCNC: 8.5 GM/DL (ref 13–17.5)
HGB BLD-MCNC: 9 GM/DL (ref 13–17.5)
HGB BLD-MCNC: 9 GM/DL (ref 13–17.5)
LYMPHOCYTES # BLD MANUAL: 0.29 K/UL (ref 1–4.8)
LYMPHOCYTES # BLD MANUAL: 0.32 K/UL (ref 1–4.8)
LYMPHOCYTES # BLD MANUAL: 0.37 K/UL (ref 1–4.8)
MCH RBC QN AUTO: 29.6 PG (ref 25–35)
MCH RBC QN AUTO: 30.6 PG (ref 25–35)
MCH RBC QN AUTO: 31 PG (ref 25–35)
MCH RBC QN AUTO: 31.6 PG (ref 25–35)
MCHC RBC AUTO-ENTMCNC: 34.3 G/DL (ref 31–37)
MCHC RBC AUTO-ENTMCNC: 34.6 G/DL (ref 31–37)
MCHC RBC AUTO-ENTMCNC: 34.9 G/DL (ref 31–37)
MCHC RBC AUTO-ENTMCNC: 35.2 G/DL (ref 31–37)
MCV RBC AUTO: 86.1 FL (ref 80–100)
MCV RBC AUTO: 88.2 FL (ref 80–100)
MCV RBC AUTO: 88.4 FL (ref 80–100)
MCV RBC AUTO: 90.6 FL (ref 80–100)
MONOCYTES # BLD MANUAL: 0.24 K/UL (ref 0–1)
MONOCYTES # BLD MANUAL: 0.32 K/UL (ref 0–1)
MONOCYTES # BLD MANUAL: 0.35 K/UL (ref 0–1)
NEUTROPHILS NFR BLD MANUAL: 52 %
NEUTROPHILS NFR BLD MANUAL: 67 %
NEUTROPHILS NFR BLD MANUAL: 71 %
NEUTS SEG # BLD MANUAL: 0.9 K/UL (ref 1.3–7.7)
NEUTS SEG # BLD MANUAL: 1.4 K/UL (ref 1.3–7.7)
NEUTS SEG # BLD MANUAL: 2.7 K/UL (ref 1.3–7.7)
PLATELET # BLD AUTO: 42 K/UL (ref 150–450)
PLATELET # BLD AUTO: 57 K/UL (ref 150–450)
PLATELET # BLD AUTO: 60 K/UL (ref 150–450)
PLATELET # BLD AUTO: 81 K/UL (ref 150–450)
POTASSIUM SERPL-SCNC: 3.7 MMOL/L (ref 3.5–5.1)
SODIUM SERPL-SCNC: 135 MMOL/L (ref 137–145)
WBC # BLD AUTO: 1.6 K/UL (ref 3.8–10.6)
WBC # BLD AUTO: 2.1 K/UL (ref 3.8–10.6)
WBC # BLD AUTO: 3.4 K/UL (ref 3.8–10.6)
WBC # BLD AUTO: 4.2 K/UL (ref 3.8–10.6)

## 2022-11-07 RX ADMIN — METOPROLOL TARTRATE SCH MG: 25 TABLET, FILM COATED ORAL at 20:31

## 2022-11-07 RX ADMIN — SUCRALFATE SCH GM: 1 TABLET ORAL at 06:31

## 2022-11-07 RX ADMIN — INSULIN ASPART SCH UNIT: 100 INJECTION, SOLUTION INTRAVENOUS; SUBCUTANEOUS at 11:32

## 2022-11-07 RX ADMIN — FILGRASTIM-SNDZ SCH MCG: 480 INJECTION, SOLUTION INTRAVENOUS; SUBCUTANEOUS at 18:45

## 2022-11-07 RX ADMIN — INSULIN ASPART SCH: 100 INJECTION, SOLUTION INTRAVENOUS; SUBCUTANEOUS at 06:31

## 2022-11-07 RX ADMIN — PANTOPRAZOLE SODIUM SCH MG: 40 INJECTION, POWDER, FOR SOLUTION INTRAVENOUS at 09:07

## 2022-11-07 RX ADMIN — HYDROCORTISONE SODIUM SUCCINATE SCH MG: 100 INJECTION, POWDER, FOR SOLUTION INTRAMUSCULAR; INTRAVENOUS at 09:06

## 2022-11-07 RX ADMIN — ONDANSETRON PRN MG: 2 INJECTION INTRAMUSCULAR; INTRAVENOUS at 19:21

## 2022-11-07 RX ADMIN — SUCRALFATE SCH GM: 1 TABLET ORAL at 20:32

## 2022-11-07 RX ADMIN — MORPHINE SULFATE PRN MG: 15 TABLET ORAL at 07:39

## 2022-11-07 RX ADMIN — IPRATROPIUM BROMIDE AND ALBUTEROL SULFATE PRN ML: .5; 3 SOLUTION RESPIRATORY (INHALATION) at 08:18

## 2022-11-07 RX ADMIN — METOPROLOL TARTRATE SCH MG: 25 TABLET, FILM COATED ORAL at 09:07

## 2022-11-07 RX ADMIN — GABAPENTIN SCH MG: 400 CAPSULE ORAL at 20:31

## 2022-11-07 RX ADMIN — INSULIN ASPART SCH UNIT: 100 INJECTION, SOLUTION INTRAVENOUS; SUBCUTANEOUS at 20:31

## 2022-11-07 RX ADMIN — SUCRALFATE SCH GM: 1 TABLET ORAL at 11:32

## 2022-11-07 RX ADMIN — BUDESONIDE SCH MG: 0.25 SUSPENSION RESPIRATORY (INHALATION) at 21:09

## 2022-11-07 RX ADMIN — HYDROCORTISONE SODIUM SUCCINATE SCH MG: 100 INJECTION, POWDER, FOR SOLUTION INTRAMUSCULAR; INTRAVENOUS at 22:33

## 2022-11-07 RX ADMIN — HYDROCORTISONE SODIUM SUCCINATE SCH MG: 100 INJECTION, POWDER, FOR SOLUTION INTRAMUSCULAR; INTRAVENOUS at 16:41

## 2022-11-07 RX ADMIN — INSULIN ASPART SCH UNIT: 100 INJECTION, SOLUTION INTRAVENOUS; SUBCUTANEOUS at 16:41

## 2022-11-07 RX ADMIN — BUDESONIDE SCH MG: 0.25 SUSPENSION RESPIRATORY (INHALATION) at 08:18

## 2022-11-07 RX ADMIN — CEFAZOLIN SCH MLS/HR: 330 INJECTION, POWDER, FOR SOLUTION INTRAMUSCULAR; INTRAVENOUS at 09:08

## 2022-11-07 RX ADMIN — IPRATROPIUM BROMIDE AND ALBUTEROL SULFATE PRN ML: .5; 3 SOLUTION RESPIRATORY (INHALATION) at 15:03

## 2022-11-07 RX ADMIN — SUCRALFATE SCH GM: 1 TABLET ORAL at 16:41

## 2022-11-07 RX ADMIN — GABAPENTIN SCH MG: 400 CAPSULE ORAL at 09:07

## 2022-11-07 NOTE — PN
PROGRESS NOTE



DATE OF SERVICE:  11/06/2022



I am covering for Dr. Ortiz.



SUBJECTIVE:

This 62-year-old gentleman, who was admitted with acute GI bleed, had 
significant GI

bleed last night.  Hemoglobin dropped to 6.  The patient is extremely pale and

complains of weakness.  The patient is being transfused 2 units, and Dr. Freire has
been

consulted.  I also talked with Dr. Macedo for ICU transfer, also recommended a 
nurse to

contact the  to initiate the possible transfer to [facility] for

tertiary care.  Dr. Larson from Surgery is also being consulted.



PAST MEDICAL HISTORY:

Reviewed.



REVIEW OF SYSTEMS:

A 14-point review of systems is negative as mentioned earlier.



CURRENT MEDICATIONS:

Reviewed and include Ventolin, dose and rest of medication noted.



PHYSICAL EXAMINATION:

VITAL SIGNS:  Pulse is 100, blood pressure ntd, respirations 15.

HEENT:  Conjunctivae pale.  Oral mucosa is pale.

NECK:  No JVD.

CARDIOVASCULAR:  S1, S2.

RESPIRATION:  Few scattered rhonchi.

ABDOMEN:  Soft, obese.

LEGS:  No edema.

NERVOUS SYSTEM:  Diffusely weak.



LABORATORY DATA:

WBC 0.9, hemoglobin is 6, and platelets are 8.



ASSESSMENT:

1. Acute gastrointestinal bleed with acute blood loss anemia, symptomatic.

2. Previous EGD showed mild gastritis.

3. Thrombocytopenia and leukopenia secondary to chemotherapy.

4. Lung cancer, stage IV.

5. Chronic obstructive pulmonary disease.

6. Multiple medical issues.



RECOMMENDATIONS:

I recommend to continue current medications and symptomatic treatment.  
Otherwise, as

mentioned earlier, at least 2 units of transfusion and platelet transfusions 
also.

Closely follow with Hematology/Oncology, transferred to ICU.  No NSAIDs or

anticoagulants.  The prognosis is extremely guarded, and I would also discuss 
with

[facility] for possible transfer once they are available, 
and

repeat labs will be ordered, and further recommendations to follow.  Prognosis 
is

extremely guarded.  Discussed with the patient.  Discussed with the staff.





MMKARMAL / IJN: [id number] / Job#: [id number]

JOLEEN

## 2022-11-07 NOTE — P.GSCN
History of Present Illness


Consult date: 11/07/22


History of present illness: 





CHIEF COMPLAINT: GI bleed





HISTORY OF PRESENT ILLNESS: This is a 62-year-old male who presented to the 

hospital with complaints of shortness of breath and bloody stools.  Patient did 

require a transfer to the ICU yesterday due to a drop in his hemoglobin and 

bright red blood per rectum.  Patient's hemoglobin on admission was 6.5 did get 

up to 8.5 then had another drop of hemoglobin down to 6.0 hemoglobin is 

currently 9.0.  Patient has received a total of 5 units of blood and 4 units of 

platelets.  Platelets on admission were 5 now up to 42.  Patient has had no 

further bleeding last night or this morning.  He is strongly requesting to be 

started on diet.  He does have a history of stage IV small cell lung cancer 

status post chemo and radiation treatment.  Patient followed by oncology service

and his pancytopenia and thrombocytopenia due to his last chemotherapy a week 

ago.  Patient denies any abdominal pain.  Denies any nausea or vomiting.  

Patient had recent EGD and colonoscopy October 2022 with Dr. avendano which did 

reveal gastritis and a right colon lipoma.  Patient seen by GI service during 

this admission and had no plans for further endoscopy.  There is no current GI 

service this week.





Patient seen and examined with Dr. avendano





PAST MEDICAL HISTORY: 


LUNG CANCER WITH TUMOR IN STOMACH AND STATES ALSO IN HIS NECK.,.  RECEIVING 

CHEMOTHERAPY., DDD WITH BACK ,HIP & LEG PAIN., (PAIN CLINIC PATIENT)., 

EMPHYSEMA., GASTRITIS.  COPD, GERD





PAST SURGICAL HISTORY: 


See below





MEDICATIONS: 


See below





ALLERGIES: 


See below





SOCIAL HISTORY: No illicit drug use.  





REVIEW OF SYSTEMS: 


CONSTITUTIONAL: Denies fever or chills.


HEENT: Denies blurred vision, vision changes, or eye pain. Denies hemoptysis 


CARDIOVASCULAR: Denies chest pain or pressure.


RESPIRATORY: No shortness of breath. 


GASTROINTESTINAL: See HPI for pertinent findings


HEMATOLOGIC: Denies bleeding disorders.


GENITOURINARY:  Denies any blood in urine or increased urinary frequency.  


SKIN: Denies pruitis. Denies rash.





PHYSICAL EXAM: 


VITAL SIGNS: Reviewed


GENERAL: Well-developed in no acute distress. 


HEENT:  No sclera icterus. Extraocular movements grossly intact.  Moist buccal 

mucosa. Head is atraumatic, normocephalic. No nasal drainage.


ABDOMEN:  Soft.  Nondistended.  Nontender


NEUROLOGIC:  Alert and oriented.  Cranial nerves II through XII grossly intact.





LABORATORY DATA:


WBC 0.2 up to 2.1 Hgb 6.5 up to 8.5 down to 6 and then up to 9.  Hemoglobin was 

7.7 on discharge 10/10/2022 platelets 5 up to 42 after transfusion


Sodium is 135 potassium is 3.7 creatinine 0.51


Glucose 133





IMAGING:


CTA of the chest no evidence of PE.  There is mediastinal and left bronchial 

adenopathy with improvement in the left pillar mass compared to older they 

suggest a role treatment response.  There is left upper lobe spiculated 

infiltrate extending from left pulmonary home to the left lung [place] which is 

slightly decreased compared to old exam.  There is encasement of the left lower 

lobe pulmonary artery with tumor mass and artery narrowing on old exam that is 

improved on today's exam.  COPD.  Patchy bilateral reticular pulmonary 

interstitial infiltrates which are increased compared to old exam.  There is 

small left pericardial effusion which is increased compared to old exam.  

Paracardial tumor masses not strictly different from last exam.





ASSESSMENT: 


1.  Acute GI bleed with bright red blood per rectum status post blood 

transfusion


2.  History of small cell lung cancer


3.  Pancytopenia





PLAN: 


-Start clear liquid diet


-If patient has any further bleeding tagged RBC scan will be ordered


-Continue monitor hemoglobin


-Continue monitor for any signs or symptoms of bleeding


-Continue IV fluids


-Continue supportive care


-Continue ICU management





Thank you for this consultation





Physician Assistant note has been reviewed by physician. Signing provider agrees

with the documented findings, assessment, and plan of care. 





Past Medical History


Past Medical History: Cancer, COPD, GERD/Reflux


Additional Past Medical History / Comment(s): LUNG CANCER WITH TUMOR IN STOMACH 

AND STATES ALSO IN HIS NECK.,.  RECEIVING CHEMOTHERAPY., DDD WITH BACK ,HIP & 

LEG PAIN., (PAIN CLINIC PATIENT)., EMPHYSEMA., GASTRITIS.


History of Any Multi-Drug Resistant Organisms: None Reported


Past Surgical History: Joint Replacement, Orthopedic Surgery


Additional Past Surgical History / Comment(s): RIGHT AND LEFT TOTAL HIPS,  RIGHT

SHOULDER SURGERY.


Past Anesthesia/Blood Transfusion Reactions: No Reported Reaction


Smoking Status: Former smoker





- Past Family History


  ** Father


Family Medical History: No Reported History





Medications and Allergies


                                Home Medications











 Medication  Instructions  Recorded  Confirmed  Type


 


Atorvastatin [Lipitor] 20 mg PO DAILY 06/25/21 11/02/22 History


 


OXcarbazepine [Trileptal] 300 mg PO BID 06/25/21 11/02/22 History


 


Pantoprazole Sodium [Protonix] 40 mg PO BID 06/25/21 11/02/22 History


 


Sucralfate [Carafate] 1 gm PO ACHS 06/25/21 11/02/22 History


 


traZODone HCL [Desyrel] 100 mg PO HS 06/25/21 11/02/22 History


 


ALPRAZolam [Xanax] 0.5 mg PO BID PRN 04/29/22 11/02/22 History


 


Budesonide [Pulmicort] 0.25 mg INHALATION RT-BID 04/29/22 11/02/22 History


 


Hydrocortisone [Cortef] 10 mg PO DAILY@1400 04/29/22 11/02/22 History


 


Ipratropium-Albuterol Nebulize 3 ml INHALATION RT-QID PRN 04/29/22 11/02/22 

History





[Duoneb 0.5 mg-3 mg/3 ml Soln]    


 


metFORMIN  mg PO BID 04/29/22 11/02/22 History


 


Albuterol Nebulized [Ventolin 2.5 mg INHALATION RT-QID PRN 06/09/22 11/02/22 

History





Nebulized]    


 


Fluconazole [Diflucan] 100 mg PO DAILY PRN 06/09/22 11/02/22 History


 


Insulin Aspart [NovoLOG Flexpen] See Protocol SQ ACHS 06/10/22 11/02/22 History


 


Dicyclomine [Bentyl] 10 mg PO TID PRN 10 Days #30 07/27/22 11/02/22 Rx





 capsule   


 


Famotidine 40 mg PO DAILY 07/27/22 11/02/22 History


 


Metoclopramide [Reglan] 5 mg PO AC-BID PRN 07/27/22 11/02/22 History


 


Semaglutide [Rybelsus] 3 mg PO DAILY 07/27/22 11/02/22 History


 


Hydrocortisone [Cortef] 20 mg PO DAILY@0800 10/01/22 11/02/22 History


 


polyethylene glycoL 3350 [Miralax] 17 gm PO DAILY PRN 10/01/22 11/02/22 History


 


Morphine Sulfate Ir [MSIR] 30 mg PO Q4HR PRN 3 Days #18 tab 10/11/22 11/02/22 Rx


 


Gabapentin [Neurontin] 400 mg PO BID 11/02/22 11/02/22 History


 


Metoprolol Tartrate [Lopressor] 12.5 mg PO BID 11/02/22 11/02/22 History


 


Ondansetron [Zofran] 4 mg PO Q4H PRN 11/02/22 11/02/22 History


 


Sennosides-Docusate Sodium 2 tab PO BID PRN 11/02/22 11/02/22 History





[Senokot-S]    


 


fentaNYL 50MCG/HR PATCH [Duragesic 1 patch TRANSDERM Q72H 11/02/22 11/02/22 

History





50MCG/HR]    


 


oxyCODONE-APAP 10-325MG [Percocet 1 tab PO Q3H PRN 11/02/22 11/02/22 History





 mg]    








                                    Allergies











Allergy/AdvReac Type Severity Reaction Status Date / Time


 


No Known Allergies Allergy   Verified 11/02/22 22:49














Surgical - Exam


                                   Vital Signs











Temp Pulse Resp BP Pulse Ox


 


 98.5 F   111 H  26 H  124/85   100 


 


 11/02/22 19:28  11/02/22 19:28  11/02/22 19:28  11/02/22 19:28  11/02/22 19:28














Results





- Labs





                                 11/07/22 05:40





                                 11/07/22 05:40


                  Abnormal Lab Results - Last 24 Hours (Table)











  11/06/22 11/06/22 11/06/22 Range/Units





  11:30 11:30 11:56 


 


WBC  0.9 L*    (3.8-10.6)  k/uL


 


RBC  1.88 L    (4.30-5.90)  m/uL


 


Hgb  6.0 L* D    (13.0-17.5)  gm/dL


 


Hct  17.8 L*    (39.0-53.0)  %


 


RDW  18.0 H    (11.5-15.5)  %


 


Plt Count  8 L* D    (150-450)  k/uL


 


Neutrophils # (Manual)     (1.3-7.7)  k/uL


 


Lymphocytes # (Manual)     (1.0-4.8)  k/uL


 


Sodium   133 L   (137-145)  mmol/L


 


Chloride   110 H   ()  mmol/L


 


Carbon Dioxide   19 L   (22-30)  mmol/L


 


BUN   6 L   (9-20)  mg/dL


 


Creatinine   0.49 L   (0.66-1.25)  mg/dL


 


Glucose   139 H   (74-99)  mg/dL


 


POC Glucose (mg/dL)    156 H  ()  mg/dL


 


Calcium   7.1 L   (8.4-10.2)  mg/dL


 


Crossmatch     














  11/06/22 11/06/22 11/06/22 Range/Units





  12:35 12:47 17:11 


 


WBC     (3.8-10.6)  k/uL


 


RBC     (4.30-5.90)  m/uL


 


Hgb     (13.0-17.5)  gm/dL


 


Hct     (39.0-53.0)  %


 


RDW     (11.5-15.5)  %


 


Plt Count     (150-450)  k/uL


 


Neutrophils # (Manual)     (1.3-7.7)  k/uL


 


Lymphocytes # (Manual)     (1.0-4.8)  k/uL


 


Sodium     (137-145)  mmol/L


 


Chloride     ()  mmol/L


 


Carbon Dioxide     (22-30)  mmol/L


 


BUN     (9-20)  mg/dL


 


Creatinine     (0.66-1.25)  mg/dL


 


Glucose     (74-99)  mg/dL


 


POC Glucose (mg/dL)   145 H  140 H  ()  mg/dL


 


Calcium     (8.4-10.2)  mg/dL


 


Crossmatch  See Detail    














  11/06/22 11/06/22 11/07/22 Range/Units





  20:13 23:46 05:40 


 


WBC   1.6 L  2.1 L  (3.8-10.6)  k/uL


 


RBC   2.78 L  2.93 L  (4.30-5.90)  m/uL


 


Hgb   8.2 L D  9.0 L  (13.0-17.5)  gm/dL


 


Hct   23.9 L  25.9 L  (39.0-53.0)  %


 


RDW   18.2 H  18.5 H  (11.5-15.5)  %


 


Plt Count   57 L D  42 L  (150-450)  k/uL


 


Neutrophils # (Manual)   0.90 L   (1.3-7.7)  k/uL


 


Lymphocytes # (Manual)   0.32 L  0.29 L  (1.0-4.8)  k/uL


 


Sodium     (137-145)  mmol/L


 


Chloride     ()  mmol/L


 


Carbon Dioxide     (22-30)  mmol/L


 


BUN     (9-20)  mg/dL


 


Creatinine     (0.66-1.25)  mg/dL


 


Glucose     (74-99)  mg/dL


 


POC Glucose (mg/dL)  138 H    ()  mg/dL


 


Calcium     (8.4-10.2)  mg/dL


 


Crossmatch     














  11/07/22 11/07/22 Range/Units





  05:40 06:23 


 


WBC    (3.8-10.6)  k/uL


 


RBC    (4.30-5.90)  m/uL


 


Hgb    (13.0-17.5)  gm/dL


 


Hct    (39.0-53.0)  %


 


RDW    (11.5-15.5)  %


 


Plt Count    (150-450)  k/uL


 


Neutrophils # (Manual)    (1.3-7.7)  k/uL


 


Lymphocytes # (Manual)    (1.0-4.8)  k/uL


 


Sodium  135 L   (137-145)  mmol/L


 


Chloride  110 H   ()  mmol/L


 


Carbon Dioxide    (22-30)  mmol/L


 


BUN  7 L   (9-20)  mg/dL


 


Creatinine  0.51 L   (0.66-1.25)  mg/dL


 


Glucose  133 H   (74-99)  mg/dL


 


POC Glucose (mg/dL)   124 H  ()  mg/dL


 


Calcium  7.5 L   (8.4-10.2)  mg/dL


 


Crossmatch    








                      Microbiology - Last 24 Hours (Table)











 11/05/22 17:30 Blood Culture - Preliminary





 Blood    No Growth after 24 hours








                                 Diabetes panel











  11/06/22 11/07/22 Range/Units





  11:30 05:40 


 


Sodium  133 L  135 L  (137-145)  mmol/L


 


Potassium  3.6  3.7  (3.5-5.1)  mmol/L


 


Chloride  110 H  110 H  ()  mmol/L


 


Carbon Dioxide  19 L  23  (22-30)  mmol/L


 


BUN  6 L  7 L  (9-20)  mg/dL


 


Creatinine  0.49 L  0.51 L  (0.66-1.25)  mg/dL


 


Glucose  139 H  133 H  (74-99)  mg/dL


 


Calcium  7.1 L  7.5 L  (8.4-10.2)  mg/dL








                                  Calcium panel











  11/06/22 11/07/22 Range/Units





  11:30 05:40 


 


Calcium  7.1 L  7.5 L  (8.4-10.2)  mg/dL








                                 Pituitary panel











  11/06/22 11/07/22 Range/Units





  11:30 05:40 


 


Sodium  133 L  135 L  (137-145)  mmol/L


 


Potassium  3.6  3.7  (3.5-5.1)  mmol/L


 


Chloride  110 H  110 H  ()  mmol/L


 


Carbon Dioxide  19 L  23  (22-30)  mmol/L


 


BUN  6 L  7 L  (9-20)  mg/dL


 


Creatinine  0.49 L  0.51 L  (0.66-1.25)  mg/dL


 


Glucose  139 H  133 H  (74-99)  mg/dL


 


Calcium  7.1 L  7.5 L  (8.4-10.2)  mg/dL








                                  Adrenal panel











  11/06/22 11/07/22 Range/Units





  11:30 05:40 


 


Sodium  133 L  135 L  (137-145)  mmol/L


 


Potassium  3.6  3.7  (3.5-5.1)  mmol/L


 


Chloride  110 H  110 H  ()  mmol/L


 


Carbon Dioxide  19 L  23  (22-30)  mmol/L


 


BUN  6 L  7 L  (9-20)  mg/dL


 


Creatinine  0.49 L  0.51 L  (0.66-1.25)  mg/dL


 


Glucose  139 H  133 H  (74-99)  mg/dL


 


Calcium  7.1 L  7.5 L  (8.4-10.2)  mg/dL

## 2022-11-07 NOTE — P.DS
Providers


Date of admission: 


11/02/22 21:38





Expected date of discharge: 11/07/22


Attending physician: 


Brenden Ortiz MD





Consults: 





                                        





11/02/22 21:23


Consult Physician Routine 


   Consulting Provider: Morales Freire


   Consult Reason/Comments: Lung cancer, GI bleed, thrombocytopenia, symptomatic

anemia


   Do you want consulting provider notified?: Yes, Notify in am


Consult Physician Routine 


   Consulting Provider: Jade Stout


   Consult Reason/Comments: GIB


   Do you want consulting provider notified?: Yes, Notify in am





11/07/22 08:24


Consult Physician Routine 


   Consulting Provider: Mina Mcbride


   Consult Reason/Comments: GIB


   Do you want consulting provider notified?: Already Contacted











Primary care physician: 


Rosa Ortiz





[facility] Course: 


Final Diagnoses:


Acute GI bleed


Pancytopenia secondary to recent treatment,Last received chemotherapy 1 week ago


Chronic abdominal, back pain due to metastatic lung disease in a patient with 

history of primary small cell malignant neoplasm of lung, stage IV.  Fentanyl 

patch dose increased last week with improvement in pain.


History of Adrenal mass likely secondary to metastatic lesion.


COPD


GERD


Anxiety/depression


Previous history of smoking


Adrenal insufficiency currently on Cortef at home.











Hospital course:This is a pleasant 62-year-old gentleman with past medical 

history of metastatic lung disease/ primary small cell malignant neoplasm of 

lung, stage IV presented to the ER with Sunil rectal bleeding X 3, accompanied 

by lower abdominal cramping and exertional shortness of breath.  Reports some 

nausea, denies emesis.  Also reports extensive workup of abdominal pain and per 

oncology attributing it to chemo effect. Last chemotherapy approximately 1 week 

ago. Chest CTA reported : no evidence of pulmonary embolism.  Mediastinal left 

bronchial adenopathy with improvement in the left hilar mass compared to old 

exam.  Slightly decreased left upper lobe spiculated infiltrate extending from 

the left pulmonary hilum to the left lung apex,compared to old exam.  Improved 

encasement of the left lower lobe pulmonary artery with tumor mass and artery 

narrowing compared to old exam.COPD. Increased patchy bilateral reticular 

pulmonary interstitial infiltrates compared to prior exam.  Increased small left

pleural effusion compared to prior exam.  Pericardial tumor masses not 

significantly different than last exam.  On admission :WBC 0.2 hemoglobin 6.5 

hematocrit 18.9 platelet count 5 INR 1.0 d-dimer 1.76 sodium 136 potassium 4.4 

BUN 12 creatinine 0.63 glucose 115, bilirubin 0.4 AST 23 ALT 22 alk phos 60. 

Repeat labs today WBC 0.1 hemoglobin 7.5 hematocrit 22 platelet count 7. 

Received 1 unit of packed red blood cell transfusion as well as 1 unit of 

platelets.  Denies any further rectal bleeding since admission.  Denies 

shortness of breath at rest.  Denies chest pain, palpitations.








11/04/2022 maintained on IV fluid hydration, Zarxio,PPI, carafate .reports dark 

maroon stooling 2 last night.  Denies any further bleeding this morning.  

Denies abdominal cramping, abdominal pain.  Consuming 25-75% with no nausea 

vomiting. Received a total of 3 units packed RBCs, 2 units of platelets.  WBC 

increased to 0.5, hemoglobin 7.9, platelets 28 .Afebrile.  Denies chest pain, 

palpitations or shortness of breath.  Complains of exertional shortness of 

breath.





Evaluated by both GI and oncology with recommendations noted and appreciated.  

Neutropenic precautions. Maintained on zaxrio,PPI, Carafate,empiric Augmentin 

and doxycycline. Transfusion parameters as per oncology. Close monitoring of 

coags.with repeat labs ordered for a.m. PT/OT. Prognosis guarded given multiple 

complex medical issues.





Over the weekend, patient had reoccurrence of rectal bleeding accompanied by 

acute drop in hemoglobin to 6 and platelets to 8 requiring transfer into the 

ICU.Maintained on supportive transfusions to maintain hemoglobin greater than 7,

platelets greater than 50.  Post transfusion, hemoglobin currently up to 9, 

platelets 42.  No bowel movement since yesterday .Denies abdominal pain .  

Denies chest pain, palpitations or shortness of breath.  Chronic back pain 

controlled.  Patient in need of GI evaluation.Transfer to [facility],in 

progress related to GI services not available at this site, this week.  

Prognosis guarded given multiple complex medical issues. 











The impression and plan of care has been dictated as directed.





:


I performed a history and examination of this patient,  discussed the same with 

the dictator.  I agree with the dictator's note ,documented as a scribe.  Any 

additional findings or plans will be noted.


Patient Condition at Discharge: Stable





Plan - Discharge Summary


Discharge Rx Participant: No


New Discharge Prescriptions: 


No Action


   Sucralfate [Carafate] 1 gm PO ACHS


   Pantoprazole Sodium [Protonix] 40 mg PO BID


   OXcarbazepine [Trileptal] 300 mg PO BID


   Atorvastatin [Lipitor] 20 mg PO DAILY


   traZODone HCL [Desyrel] 100 mg PO HS


   metFORMIN  mg PO BID


   Budesonide [Pulmicort] 0.25 mg INHALATION RT-BID


   Fluconazole [Diflucan] 100 mg PO DAILY PRN


     PRN Reason: THRUSH


   Albuterol Nebulized [Ventolin Nebulized] 2.5 mg INHALATION RT-QID PRN


     PRN Reason: Shortness Of Breath


   Hydrocortisone [Cortef] 20 mg PO DAILY@0800


   polyethylene glycoL 3350 [Miralax] 17 gm PO DAILY PRN


     PRN Reason: Constipation


   Morphine Sulfate Ir [MSIR] 30 mg PO Q4HR PRN 3 Days #18 tab


     PRN Reason: Pain


   Ondansetron [Zofran] 4 mg PO Q4H PRN


     PRN Reason: Nausea


   fentaNYL 50MCG/HR PATCH [Duragesic 50MCG/HR] 1 patch TRANSDERM Q72H


   Metoprolol Tartrate [Lopressor] 12.5 mg PO BID


   Ipratropium-Albuterol Nebulize [Duoneb 0.5 mg-3 mg/3 ml Soln] 3 ml INHALATION

RT-QID PRN


     PRN Reason: Shortness Of Breath


   ALPRAZolam [Xanax] 0.5 mg PO BID PRN


     PRN Reason: Anxiety


   Hydrocortisone [Cortef] 10 mg PO DAILY@1400


   Insulin Aspart [NovoLOG Flexpen] See Protocol SQ ACHS


   Metoclopramide [Reglan] 5 mg PO AC-BID PRN


     PRN Reason: Nausea


   Famotidine 40 mg PO DAILY


   Semaglutide [Rybelsus] 3 mg PO DAILY


   Dicyclomine [Bentyl] 10 mg PO TID PRN 10 Days #30 capsule


     PRN Reason: Pain


   oxyCODONE-APAP 10-325MG [Percocet  mg] 1 tab PO Q3H PRN


     PRN Reason: Pain


   Sennosides-Docusate Sodium [Senokot-S] 2 tab PO BID PRN


     PRN Reason: Constipation


   Gabapentin [Neurontin] 400 mg PO BID


Discharge Medication List





Atorvastatin [Lipitor] 20 mg PO DAILY 06/25/21 [History]


OXcarbazepine [Trileptal] 300 mg PO BID 06/25/21 [History]


Pantoprazole Sodium [Protonix] 40 mg PO BID 06/25/21 [History]


Sucralfate [Carafate] 1 gm PO ACHS 06/25/21 [History]


traZODone HCL [Desyrel] 100 mg PO HS 06/25/21 [History]


ALPRAZolam [Xanax] 0.5 mg PO BID PRN 04/29/22 [History]


Budesonide [Pulmicort] 0.25 mg INHALATION RT-BID 04/29/22 [History]


Hydrocortisone [Cortef] 10 mg PO DAILY@1400 04/29/22 [History]


Ipratropium-Albuterol Nebulize [Duoneb 0.5 mg-3 mg/3 ml Soln] 3 ml INHALATION 

RT-QID PRN 04/29/22 [History]


metFORMIN  mg PO BID 04/29/22 [History]


Albuterol Nebulized [Ventolin Nebulized] 2.5 mg INHALATION RT-QID PRN 06/09/22 

[History]


Fluconazole [Diflucan] 100 mg PO DAILY PRN 06/09/22 [History]


Insulin Aspart [NovoLOG Flexpen] See Protocol SQ ACHS 06/10/22 [History]


Dicyclomine [Bentyl] 10 mg PO TID PRN 10 Days #30 capsule 07/27/22 [Rx]


Famotidine 40 mg PO DAILY 07/27/22 [History]


Metoclopramide [Reglan] 5 mg PO AC-BID PRN 07/27/22 [History]


Semaglutide [Rybelsus] 3 mg PO DAILY 07/27/22 [History]


Hydrocortisone [Cortef] 20 mg PO DAILY@0800 10/01/22 [History]


polyethylene glycoL 3350 [Miralax] 17 gm PO DAILY PRN 10/01/22 [History]


Morphine Sulfate Ir [MSIR] 30 mg PO Q4HR PRN 3 Days #18 tab 10/11/22 [Rx]


Gabapentin [Neurontin] 400 mg PO BID 11/02/22 [History]


Metoprolol Tartrate [Lopressor] 12.5 mg PO BID 11/02/22 [History]


Ondansetron [Zofran] 4 mg PO Q4H PRN 11/02/22 [History]


Sennosides-Docusate Sodium [Senokot-S] 2 tab PO BID PRN 11/02/22 [History]


fentaNYL 50MCG/HR PATCH [Duragesic 50MCG/HR] 1 patch TRANSDERM Q72H 11/02/22 

[History]


oxyCODONE-APAP 10-325MG [Percocet  mg] 1 tab PO Q3H PRN 11/02/22 [History]








Follow up Appointment(s)/Referral(s): 


Rosa Ortiz DO [Primary Care Provider] - 1-2 days

## 2022-11-07 NOTE — P.CNPUL
History of Present Illness


Consult date: 11/07/22


Requesting physician: Brenden Ortiz


Reason for consult: other (GI bleeding)


Chief complaint: Shortness of breath and bloody stools


History of present illness: 





This is a 62-year-old white male with history of small cell lung cancer, 

presented initially back in 2021 with a large pleural effusion requiring 

thoracentesis and the diagnosis was made.  Patient received chemotherapy by 

oncology, and has been receiving chemotherapy maintenance.  Patient was admitted

on 11//22, mostly with shortness of breath, and multiple episodes of bloody 

stools.  Patient required so far multiple blood transfusions including 5 units 

of packed RBCs and 4 units of platelets, patient was noted to be pancytopenic, 

admitted because of the GI bleeding, and we were asked to see him on 

consultation today as the patient had worsening episodes of bleeding and 

required transfer to the ICU.  He was seen by general surgery, the plan is to 

consider tagged RBC study, no plans to have any intervention.  No GI coverage a

vailable, patient is being considered for transfer to [facility] once a

bed becomes available.  Considering the patient was admitted to the ICU, I was 

asked to see him on consultation.  Pulmonary-wise, the patient does not seem to 

be in any distress, he seems to be very comfortable.  His hemoglobin this 

morning is 9.0, WBC count is 2.1, platelets are 42,000.  Patient is being 

followed by hematology/oncology.  CT angiogram of the chest showed no evidence 

of pulmonary embolism, patient had bilateral reticular pulmonary interstitial 

infiltrates, and he had left upper lobe spiculated abnormality consistent with 

bronchogenic carcinoma, however seems to have a partial response to treatment





Review of Systems


CARDIOPULMONARY: As noted in HPI


Gastrointestinal: As noted in HPI


GENITOURINARY:  No dysuria or hematuria. 


MUSCULOSKELETAL: Reports normal range of motion.


SKIN: No rashes.  No jaundice.


ENDOCRINE: No chills, fevers.  No excessive weight gain or loss.  No polydipsia 

or polyuria.


PSYCHIATRIC: Unremarkable. 


NEUROLOGY: No change in mental status.  Denies dizziness, headache.


ENT: Vision unremarkable. 


CONSTITUTIONAL: No recent weight loss. No fever, chills, night sweats. 











Past Medical History


Past Medical History: Cancer, COPD, GERD/Reflux


Additional Past Medical History / Comment(s): LUNG CANCER WITH TUMOR IN STOMACH 

AND STATES ALSO IN HIS NECK.,.  RECEIVING CHEMOTHERAPY., DDD WITH BACK ,HIP & 

LEG PAIN., (PAIN CLINIC PATIENT)., EMPHYSEMA., GASTRITIS.


History of Any Multi-Drug Resistant Organisms: None Reported


Past Surgical History: Joint Replacement, Orthopedic Surgery


Additional Past Surgical History / Comment(s): RIGHT AND LEFT TOTAL HIPS,  RIGHT

SHOULDER SURGERY.


Past Anesthesia/Blood Transfusion Reactions: No Reported Reaction


Smoking Status: Former smoker





- Past Family History


  ** Father


Family Medical History: No Reported History





Medications and Allergies


                                Home Medications











 Medication  Instructions  Recorded  Confirmed  Type


 


Atorvastatin [Lipitor] 20 mg PO DAILY 06/25/21 11/02/22 History


 


OXcarbazepine [Trileptal] 300 mg PO BID 06/25/21 11/02/22 History


 


Pantoprazole Sodium [Protonix] 40 mg PO BID 06/25/21 11/02/22 History


 


Sucralfate [Carafate] 1 gm PO ACHS 06/25/21 11/02/22 History


 


traZODone HCL [Desyrel] 100 mg PO HS 06/25/21 11/02/22 History


 


ALPRAZolam [Xanax] 0.5 mg PO BID PRN 04/29/22 11/02/22 History


 


Budesonide [Pulmicort] 0.25 mg INHALATION RT-BID 04/29/22 11/02/22 History


 


Hydrocortisone [Cortef] 10 mg PO DAILY@1400 04/29/22 11/02/22 History


 


Ipratropium-Albuterol Nebulize 3 ml INHALATION RT-QID PRN 04/29/22 11/02/22 

History





[Duoneb 0.5 mg-3 mg/3 ml Soln]    


 


metFORMIN  mg PO BID 04/29/22 11/02/22 History


 


Albuterol Nebulized [Ventolin 2.5 mg INHALATION RT-QID PRN 06/09/22 11/02/22 

History





Nebulized]    


 


Fluconazole [Diflucan] 100 mg PO DAILY PRN 06/09/22 11/02/22 History


 


Insulin Aspart [NovoLOG Flexpen] See Protocol SQ ACHS 06/10/22 11/02/22 History


 


Dicyclomine [Bentyl] 10 mg PO TID PRN 10 Days #30 07/27/22 11/02/22 Rx





 capsule   


 


Famotidine 40 mg PO DAILY 07/27/22 11/02/22 History


 


Metoclopramide [Reglan] 5 mg PO AC-BID PRN 07/27/22 11/02/22 History


 


Semaglutide [Rybelsus] 3 mg PO DAILY 07/27/22 11/02/22 History


 


Hydrocortisone [Cortef] 20 mg PO DAILY@0800 10/01/22 11/02/22 History


 


polyethylene glycoL 3350 [Miralax] 17 gm PO DAILY PRN 10/01/22 11/02/22 History


 


Morphine Sulfate Ir [MSIR] 30 mg PO Q4HR PRN 3 Days #18 tab 10/11/22 11/02/22 Rx


 


Gabapentin [Neurontin] 400 mg PO BID 11/02/22 11/02/22 History


 


Metoprolol Tartrate [Lopressor] 12.5 mg PO BID 11/02/22 11/02/22 History


 


Ondansetron [Zofran] 4 mg PO Q4H PRN 11/02/22 11/02/22 History


 


Sennosides-Docusate Sodium 2 tab PO BID PRN 11/02/22 11/02/22 History





[Senokot-S]    


 


fentaNYL 50MCG/HR PATCH [Duragesic 1 patch TRANSDERM Q72H 11/02/22 11/02/22 

History





50MCG/HR]    


 


oxyCODONE-APAP 10-325MG [Percocet 1 tab PO Q3H PRN 11/02/22 11/02/22 History





 mg]    








                                    Allergies











Allergy/AdvReac Type Severity Reaction Status Date / Time


 


No Known Allergies Allergy   Verified 11/02/22 22:49














Physical Exam


Vitals: 


                                   Vital Signs











  Temp Pulse Resp BP Pulse Ox


 


 11/07/22 12:48  97.7 F  112 H  28 H  117/69  92 L


 


 11/07/22 12:00   96  24  96/65  87 L


 


 11/07/22 11:00   97  15  99/57  92 L


 


 11/07/22 10:00   99  15  112/69  87 L


 


 11/07/22 09:00   97  20  116/73  90 L


 


 11/07/22 08:34   96   


 


 11/07/22 08:18   93   


 


 11/07/22 08:00  98.0 F  92  16  131/75  94 L


 


 11/07/22 06:00   89  12  129/69  93 L


 


 11/07/22 05:30   90  10 L  138/76  94 L


 


 11/07/22 05:00   88  12  132/75  93 L


 


 11/07/22 04:30   88  12  151/77  91 L


 


 11/07/22 04:00  96.2 F L  87  12  147/79  93 L


 


 11/07/22 03:30   85  16  139/77  92 L


 


 11/07/22 03:00   85  11 L  150/76  92 L


 


 11/07/22 02:30   77  10 L  138/80  94 L


 


 11/07/22 02:00   76  9 L  122/66  93 L


 


 11/07/22 01:30   77  10 L  116/67  94 L


 


 11/07/22 01:00   79  10 L  107/58  94 L


 


 11/07/22 00:30   82  10 L  80/51  93 L


 


 11/07/22 00:00   87  10 L  98/60  92 L


 


 11/06/22 23:30  98.1 F  84  12  92/62  93 L


 


 11/06/22 23:00   86  11 L  96/57  95


 


 11/06/22 22:55  98.1 F  86  11 L  81/65  94 L


 


 11/06/22 22:35  97.9 F  90  12  96/57  93 L


 


 11/06/22 22:30   90  19  90/62  93 L


 


 11/06/22 22:27  98.0 F  90  16  90/62  94 L


 


 11/06/22 22:00   91  18  102/60  93 L


 


 11/06/22 21:30   92  15  107/59  92 L


 


 11/06/22 21:09  97.9 F  95  12  107/59  92 L


 


 11/06/22 21:00   97  12  103/53  93 L


 


 11/06/22 20:30   106 H  15  113/63  92 L


 


 11/06/22 20:00  97.5 F L  109 H  15  118/58  93 L


 


 11/06/22 19:37  97.6 F  101 H  14  118/58  94 L


 


 11/06/22 19:32   98   


 


 11/06/22 19:30   96  15  117/56  100


 


 11/06/22 19:15   93   


 


 11/06/22 19:00   95  13  117/56  97


 


 11/06/22 18:30   94  14  116/55  95


 


 11/06/22 18:11  98.0 F  95  18  128/60 


 


 11/06/22 18:00   96  21  128/60  95


 


 11/06/22 17:51  98.4 F  97  19  121/51  94 L


 


 11/06/22 17:41  97.8 F  98  23  123/42  93 L


 


 11/06/22 17:18  97.8 F  98  20  122/56  93 L


 


 11/06/22 17:00  98.1 F  100  17  133/72  93 L


 


 11/06/22 16:00  97.7 F  101 H  21  106/62  93 L


 


 11/06/22 15:56  98.4 F  101 H  18  106/62  93 L


 


 11/06/22 15:51  98.4 F  100  15  120/53  92 L


 


 11/06/22 15:44  98.4 F  99  17  110/57  95


 


 11/06/22 15:29   99   


 


 11/06/22 15:18   98    91 L


 


 11/06/22 15:00   105 H  21  101/50  91 L


 


 11/06/22 14:30   98  19  89/49  96


 


 11/06/22 14:29  97.8 F  98  21  89/49  98


 


 11/06/22 14:09  97.6 F  100  19  104/53 


 


 11/06/22 14:00   104 H  18  91/46  94 L


 


 11/06/22 13:59  97.7 F  99  18  113/66 


 


 11/06/22 13:30   100  17  92/51  96








                                Intake and Output











 11/06/22 11/07/22 11/07/22





 22:59 06:59 14:59


 


Intake Total 1329 726 500


 


Output Total 200 1050 300


 


Balance 1129 -324 200


 


Intake:   


 


   480 350


 


    Sodium Chloride 0.9% 1, 240 480 300





    000 ml @ 60 mls/hr IV .   





    [id number] PETER Rx#:[id number]   


 


    cefTRIAXone 1 gm In   50





    Sodium Chloride 0.9% 50   





    ml @ 100 mls/hr IVPB   





    Q24HR PETER Rx#:[id number]   


 


  Intake, IV Titration 120  





  Amount   


 


    Sodium Chloride 0.9% 1, 120  





    000 ml @ 60 mls/hr IV .   





    [id number] PETER Rx#:[id number]   


 


  Oral   150


 


  Blood Product 969 246 0


 


    Platelet Lvds [place] Pas 0 246 





    Irr Ct1  Unit   





    [id number]   


 


    Platelet Pheresis Pas   0





    Psoralen  Unit   





    [id number]   


 


    Platelet Pheresis Pas 349  





    Psoralen  Unit   





    [id number]   


 


    Rc Irr As1  Unit 310  





    [id number]   


 


    Rc Irr As1  Unit 310  





    [id number]   


 


Output:   


 


  Urine 200 1050 300


 


Other:   


 


  Voiding Method Urinal External Catheter External Catheter


 


  # Voids  0 1


 


  # Bowel Movements 1  


 


  Weight  83.2 kg 83.2 kg














General appearance: Revealed a 62-year-old white male in no distress.


HET: Head is normocephalic and atraumatic.  Conjunctiva pink.  Sclera anicteric.


Neck: Supple without lymphadenopathy.  Trachea midline.


Heart: S1 S2.  Regular rate and rhythm.


Lungs: Clear to auscultation.  No crackles or rhonchi or wheezes.


Abdomen: Soft, nontender, nondistended with  bowel sounds.  No guarding or 

rigidity.


Skin:  No rashes. No jaundice.


Extremities: Normal skin color and turgor.  No pedal edema.


Neurological: No focal deficits.  Alert and oriented x3.


Psychiatric: Normal mood, affect and normal mental status examination.


Musculoskeletal: No deformities and no limitation in range of motion











Results





- Laboratory Findings


CBC and BMP: 


                                 11/07/22 05:40





                                 11/07/22 05:40


PT/INR, D-dimer











PT  10.5 sec (9.0-12.0)   11/02/22  20:32    


 


INR  1.0  (<1.2)   11/02/22  20:32    


 


D-Dimer  1.76 mg/L FEU (<0.60)  H  11/02/22  20:32    








Abnormal lab findings: 


                                  Abnormal Labs











  11/02/22 11/02/22 11/02/22





  20:25 20:32 20:32


 


WBC   0.2 L* 


 


RBC   1.78 L 


 


Hgb   6.5 L* D 


 


Hct   18.9 L* 


 


MCV   106.2 H D 


 


MCH   36.8 H 


 


RDW   


 


Plt Count   5 L* D 


 


Neutrophils # (Manual)   


 


Lymphocytes # (Manual)   


 


D-Dimer    1.76 H


 


Sodium   


 


Chloride   


 


Carbon Dioxide   


 


BUN   


 


Creatinine   


 


Glucose   


 


POC Glucose (mg/dL)   


 


Calcium   


 


Total Protein   


 


Albumin   


 


Crossmatch  See Detail  














  11/02/22 11/02/22 11/03/22





  20:32 23:49 06:11


 


WBC   


 


RBC   


 


Hgb   


 


Hct   


 


MCV   


 


MCH   


 


RDW   


 


Plt Count   


 


Neutrophils # (Manual)   


 


Lymphocytes # (Manual)   


 


D-Dimer   


 


Sodium  136 L  


 


Chloride   


 


Carbon Dioxide  19 L  


 


BUN   


 


Creatinine  0.63 L  


 


Glucose  115 H  


 


POC Glucose (mg/dL)   418 H  128 H


 


Calcium  7.5 L  


 


Total Protein  5.5 L  


 


Albumin  2.8 L  


 


Crossmatch   














  11/03/22 11/03/22 11/03/22





  06:52 06:52 11:38


 


WBC  0.1 L*  


 


RBC  2.22 L  


 


Hgb  7.5 L  


 


Hct  22.6 L  


 


MCV  101.6 H  


 


MCH   


 


RDW  16.8 H  


 


Plt Count  7 L*  


 


Neutrophils # (Manual)   


 


Lymphocytes # (Manual)   


 


D-Dimer   


 


Sodium   


 


Chloride   109 H 


 


Carbon Dioxide   20 L 


 


BUN   


 


Creatinine   0.59 L 


 


Glucose   108 H 


 


POC Glucose (mg/dL)    191 H


 


Calcium   7.1 L 


 


Total Protein   


 


Albumin   


 


Crossmatch   














  11/03/22 11/03/22 11/03/22





  16:30 19:35 21:25


 


WBC    0.3 L*


 


RBC    2.15 L


 


Hgb    7.2 L


 


Hct    20.0 L


 


MCV   


 


MCH   


 


RDW    18.3 H


 


Plt Count    37 L D


 


Neutrophils # (Manual)   


 


Lymphocytes # (Manual)   


 


D-Dimer   


 


Sodium   


 


Chloride   


 


Carbon Dioxide   


 


BUN   


 


Creatinine   


 


Glucose   


 


POC Glucose (mg/dL)  240 H  254 H 


 


Calcium   


 


Total Protein   


 


Albumin   


 


Crossmatch   














  11/04/22 11/04/22 11/04/22





  05:52 08:05 11:48


 


WBC   0.5 L* 


 


RBC   2.41 L 


 


Hgb   7.9 L 


 


Hct   22.0 L 


 


MCV   


 


MCH   


 


RDW   18.5 H 


 


Plt Count   28 L 


 


Neutrophils # (Manual)   


 


Lymphocytes # (Manual)   


 


D-Dimer   


 


Sodium   


 


Chloride   


 


Carbon Dioxide   


 


BUN   


 


Creatinine   


 


Glucose   


 


POC Glucose (mg/dL)  154 H   113 H


 


Calcium   


 


Total Protein   


 


Albumin   


 


Crossmatch   














  11/04/22 11/05/22 11/05/22





  20:11 08:25 19:01


 


WBC   0.8 L* 


 


RBC   2.55 L 


 


Hgb   8.5 L 


 


Hct   23.5 L 


 


MCV   


 


MCH   


 


RDW   18.4 H 


 


Plt Count   23 L 


 


Neutrophils # (Manual)   


 


Lymphocytes # (Manual)   


 


D-Dimer   


 


Sodium    133 L


 


Chloride    110 H


 


Carbon Dioxide    19 L


 


BUN    6 L


 


Creatinine    0.54 L


 


Glucose    179 H


 


POC Glucose (mg/dL)  140 H  


 


Calcium    6.9 L


 


Total Protein   


 


Albumin   


 


Crossmatch   














  11/05/22 11/06/22 11/06/22





  21:37 06:03 11:30


 


WBC    0.9 L*


 


RBC    1.88 L


 


Hgb    6.0 L* D


 


Hct    17.8 L*


 


MCV   


 


MCH   


 


RDW    18.0 H


 


Plt Count    8 L* D


 


Neutrophils # (Manual)   


 


Lymphocytes # (Manual)   


 


D-Dimer   


 


Sodium   


 


Chloride   


 


Carbon Dioxide   


 


BUN   


 


Creatinine   


 


Glucose   


 


POC Glucose (mg/dL)  239 H  192 H 


 


Calcium   


 


Total Protein   


 


Albumin   


 


Crossmatch   














  11/06/22 11/06/22 11/06/22





  11:30 11:56 12:35


 


WBC   


 


RBC   


 


Hgb   


 


Hct   


 


MCV   


 


MCH   


 


RDW   


 


Plt Count   


 


Neutrophils # (Manual)   


 


Lymphocytes # (Manual)   


 


D-Dimer   


 


Sodium  133 L  


 


Chloride  110 H  


 


Carbon Dioxide  19 L  


 


BUN  6 L  


 


Creatinine  0.49 L  


 


Glucose  139 H  


 


POC Glucose (mg/dL)   156 H 


 


Calcium  7.1 L  


 


Total Protein   


 


Albumin   


 


Crossmatch    See Detail














  11/06/22 11/06/22 11/06/22





  12:47 17:11 20:13


 


WBC   


 


RBC   


 


Hgb   


 


Hct   


 


MCV   


 


MCH   


 


RDW   


 


Plt Count   


 


Neutrophils # (Manual)   


 


Lymphocytes # (Manual)   


 


D-Dimer   


 


Sodium   


 


Chloride   


 


Carbon Dioxide   


 


BUN   


 


Creatinine   


 


Glucose   


 


POC Glucose (mg/dL)  145 H  140 H  138 H


 


Calcium   


 


Total Protein   


 


Albumin   


 


Crossmatch   














  11/06/22 11/07/22 11/07/22





  23:46 05:40 05:40


 


WBC  1.6 L  2.1 L 


 


RBC  2.78 L  2.93 L 


 


Hgb  8.2 L D  9.0 L 


 


Hct  23.9 L  25.9 L 


 


MCV   


 


MCH   


 


RDW  18.2 H  18.5 H 


 


Plt Count  57 L D  42 L 


 


Neutrophils # (Manual)  0.90 L  


 


Lymphocytes # (Manual)  0.32 L  0.29 L 


 


D-Dimer   


 


Sodium    135 L


 


Chloride    110 H


 


Carbon Dioxide   


 


BUN    7 L


 


Creatinine    0.51 L


 


Glucose    133 H


 


POC Glucose (mg/dL)   


 


Calcium    7.5 L


 


Total Protein   


 


Albumin   


 


Crossmatch   














  11/07/22 11/07/22





  06:23 11:22


 


WBC  


 


RBC  


 


Hgb  


 


Hct  


 


MCV  


 


MCH  


 


RDW  


 


Plt Count  


 


Neutrophils # (Manual)  


 


Lymphocytes # (Manual)  


 


D-Dimer  


 


Sodium  


 


Chloride  


 


Carbon Dioxide  


 


BUN  


 


Creatinine  


 


Glucose  


 


POC Glucose (mg/dL)  124 H  155 H


 


Calcium  


 


Total Protein  


 


Albumin  


 


Crossmatch  














- Diagnostic Findings


CT scan - chest: image reviewed (As noted in HPI)





Assessment and Plan


Assessment: 





Impression:


Acute GI bleeding, being addressed by general surgery and hematology/oncology on

the case.


Pancytopenia secondary to chemotherapy


Small cell lung cancer/metastatic.


History of underlying COPD/emphysema


History of degenerative joint disease and multiple orthopedic surgeries.


History of GERD.





Recommendation:


Continue to monitor in the ICU


Transfuse blood products as felt necessary and transfuse packed RBCs if 

hemoglobin below 7


Continue transfer plans to [facility] once a bed is available 

especially since we have no GI coverage


Continue to monitor daily labs and daily CBC


Continue bronchodilators


Resume home meds


Prognosis is guarded, we'll continue to follow





Time with Patient: Greater than 30

## 2022-11-08 LAB
ANION GAP SERPL CALC-SCNC: 2 MMOL/L
BUN SERPL-SCNC: 8 MG/DL (ref 9–20)
CALCIUM SPEC-MCNC: 7.5 MG/DL (ref 8.4–10.2)
CELLS COUNTED: 100
CELLS COUNTED: 200
CHLORIDE SERPL-SCNC: 109 MMOL/L (ref 98–107)
CO2 SERPL-SCNC: 26 MMOL/L (ref 22–30)
ERYTHROCYTE [DISTWIDTH] IN BLOOD BY AUTOMATED COUNT: 2.95 M/UL (ref 4.3–5.9)
ERYTHROCYTE [DISTWIDTH] IN BLOOD: 18.5 % (ref 11.5–15.5)
GLUCOSE BLD-MCNC: 136 MG/DL (ref 70–110)
GLUCOSE BLD-MCNC: 138 MG/DL (ref 70–110)
GLUCOSE BLD-MCNC: 142 MG/DL (ref 70–110)
GLUCOSE BLD-MCNC: 232 MG/DL (ref 70–110)
GLUCOSE SERPL-MCNC: 125 MG/DL (ref 74–99)
HCT VFR BLD AUTO: 26.3 % (ref 39–53)
HGB BLD-MCNC: 9.1 GM/DL (ref 13–17.5)
LYMPHOCYTES # BLD MANUAL: 0.3 K/UL (ref 1–4.8)
LYMPHOCYTES # BLD MANUAL: 0.34 K/UL (ref 1–4.8)
MCH RBC QN AUTO: 30.7 PG (ref 25–35)
MCHC RBC AUTO-ENTMCNC: 34.4 G/DL (ref 31–37)
MCV RBC AUTO: 89.2 FL (ref 80–100)
METAMYELOCYTES # BLD: 0.04 K/UL
MONOCYTES # BLD MANUAL: 0.92 K/UL (ref 0–1)
MONOCYTES # BLD MANUAL: 0.94 K/UL (ref 0–1)
NEUTROPHILS NFR BLD MANUAL: 69 %
NEUTROPHILS NFR BLD MANUAL: 79 %
NEUTS SEG # BLD MANUAL: 2.9 K/UL (ref 1.3–7.7)
NEUTS SEG # BLD MANUAL: 4.66 K/UL (ref 1.3–7.7)
PLATELET # BLD AUTO: 54 K/UL (ref 150–450)
POTASSIUM SERPL-SCNC: 3.5 MMOL/L (ref 3.5–5.1)
SODIUM SERPL-SCNC: 137 MMOL/L (ref 137–145)
WBC # BLD AUTO: 5.9 K/UL (ref 3.8–10.6)

## 2022-11-08 PROCEDURE — 02HV33Z INSERTION OF INFUSION DEVICE INTO SUPERIOR VENA CAVA, PERCUTANEOUS APPROACH: ICD-10-PCS

## 2022-11-08 RX ADMIN — CEFAZOLIN SCH MLS/HR: 330 INJECTION, POWDER, FOR SOLUTION INTRAMUSCULAR; INTRAVENOUS at 06:34

## 2022-11-08 RX ADMIN — BUDESONIDE SCH MG: 0.25 SUSPENSION RESPIRATORY (INHALATION) at 07:35

## 2022-11-08 RX ADMIN — MORPHINE SULFATE PRN MG: 15 TABLET ORAL at 18:41

## 2022-11-08 RX ADMIN — IPRATROPIUM BROMIDE AND ALBUTEROL SULFATE PRN ML: .5; 3 SOLUTION RESPIRATORY (INHALATION) at 07:35

## 2022-11-08 RX ADMIN — GABAPENTIN SCH MG: 400 CAPSULE ORAL at 20:40

## 2022-11-08 RX ADMIN — HYDROCORTISONE SODIUM SUCCINATE SCH MG: 100 INJECTION, POWDER, FOR SOLUTION INTRAMUSCULAR; INTRAVENOUS at 16:59

## 2022-11-08 RX ADMIN — Medication PRN MG: at 22:07

## 2022-11-08 RX ADMIN — IPRATROPIUM BROMIDE AND ALBUTEROL SULFATE PRN ML: .5; 3 SOLUTION RESPIRATORY (INHALATION) at 19:57

## 2022-11-08 RX ADMIN — INSULIN ASPART SCH: 100 INJECTION, SOLUTION INTRAVENOUS; SUBCUTANEOUS at 06:35

## 2022-11-08 RX ADMIN — CEFAZOLIN SCH: 330 INJECTION, POWDER, FOR SOLUTION INTRAMUSCULAR; INTRAVENOUS at 13:43

## 2022-11-08 RX ADMIN — GABAPENTIN SCH MG: 400 CAPSULE ORAL at 08:21

## 2022-11-08 RX ADMIN — INSULIN ASPART SCH: 100 INJECTION, SOLUTION INTRAVENOUS; SUBCUTANEOUS at 20:36

## 2022-11-08 RX ADMIN — OXYCODONE HYDROCHLORIDE AND ACETAMINOPHEN PRN EACH: 10; 325 TABLET ORAL at 06:37

## 2022-11-08 RX ADMIN — SUCRALFATE SCH GM: 1 TABLET ORAL at 16:59

## 2022-11-08 RX ADMIN — INSULIN ASPART SCH: 100 INJECTION, SOLUTION INTRAVENOUS; SUBCUTANEOUS at 16:58

## 2022-11-08 RX ADMIN — METOPROLOL TARTRATE SCH MG: 25 TABLET, FILM COATED ORAL at 20:40

## 2022-11-08 RX ADMIN — SUCRALFATE SCH GM: 1 TABLET ORAL at 20:40

## 2022-11-08 RX ADMIN — SUCRALFATE SCH GM: 1 TABLET ORAL at 12:08

## 2022-11-08 RX ADMIN — BUDESONIDE SCH MG: 0.25 SUSPENSION RESPIRATORY (INHALATION) at 19:57

## 2022-11-08 RX ADMIN — HYDROCORTISONE SODIUM SUCCINATE SCH MG: 100 INJECTION, POWDER, FOR SOLUTION INTRAMUSCULAR; INTRAVENOUS at 08:21

## 2022-11-08 RX ADMIN — INSULIN ASPART SCH UNIT: 100 INJECTION, SOLUTION INTRAVENOUS; SUBCUTANEOUS at 12:08

## 2022-11-08 RX ADMIN — SUCRALFATE SCH GM: 1 TABLET ORAL at 07:29

## 2022-11-08 RX ADMIN — PANTOPRAZOLE SODIUM SCH MG: 40 INJECTION, POWDER, FOR SOLUTION INTRAVENOUS at 08:22

## 2022-11-08 RX ADMIN — ONDANSETRON PRN MG: 2 INJECTION INTRAMUSCULAR; INTRAVENOUS at 04:23

## 2022-11-08 RX ADMIN — IPRATROPIUM BROMIDE AND ALBUTEROL SULFATE PRN ML: .5; 3 SOLUTION RESPIRATORY (INHALATION) at 11:42

## 2022-11-08 RX ADMIN — METOPROLOL TARTRATE SCH MG: 25 TABLET, FILM COATED ORAL at 08:21

## 2022-11-08 NOTE — XR
EXAMINATION TYPE: XR chest 1V portable

 

DATE OF EXAM: 11/8/2022

 

COMPARISON: 11/2/2022

 

HISTORY: PICC line placement

 

TECHNIQUE: Single frontal view of the chest is obtained.

 

FINDINGS:  Diffuse interstitial pattern with bilateral pleural effusion or thickening disease or infi
ltrate. Diffuse osteopenia. Left-sided PICC line seen with the tip overlying the cavoatrial junction.
 Postsurgical change right shoulder. Underlying COPD suspected. Sclerotic left humeral lesion not inc
luded in the field of view on today's x-ray.

 

IMPRESSION:  

1. COPD with bilateral pleural thickening or small effusion stable from prior exam. Suspect chronic i
diopathic pulmonary fibrosis. Superimposed pneumonitis not excluded.

## 2022-11-08 NOTE — P.PN
Subjective


Progress Note Date: 11/08/22





CHIEF COMPLAINT: Acute GI bleeding





HISTORY OF PRESENT ILLNESS: Patient remains in the ICU.  Patient did have 2 more

bloody bowel movements this morning.  He had bright red blood mixed with stool. 

He is awaiting transfer to [facility].  Afebrile.  Mild tachycardia.  

WBC is 5.9 Hgb is up from 8.5-9.1 repeat CBC at 3:00 is pending platelets are 54

sodium is 137 potassium 3.5 creatinine 0.51 patient denies any abdominal pain.





PHYSICAL EXAM: 


VITAL SIGNS: Reviewed.


GENERAL: Well-developed in no acute distress. 


HEENT:  No sclera icterus. Extraocular movements grossly intact.  Moist buccal 

mucosa. Head is atraumatic, normocephalic. 


ABDOMEN:  Soft.  Nondistended. Nontender. 


NEUROLOGIC: Alert and oriented. Cranial nerves II through XII grossly intact.





ASSESSMENT: 


1.  Acute GI bleed with bright red blood per rectum status post blood 

transfusion


2.  History of small cell lung cancer


3.  Pancytopenia 








PLAN: 


-Patient scheduled for tagged RBC scan for further evaluation of GI bleed


-Continue to monitor hemoglobin.  Repeat CBC is at 3:00 today


-Increase IV fluids 100 mL per hour


-Continue PPI


-Continue transfer plans to [facility]


-Continue clear liquids





Physician Assistant note has been reviewed by physician. Signing provider agrees

with the documented findings, assessment, and plan of care. 





I have personally seen and examined the patient, reviewed the NP /PAs history, 

exam and MDM and agree with the assessment and plan as written. Based on total 

visit time, I have performed more than 50% of the visit.





As above:  Patient doing better today.  Denies abdominal pain.  He was having 

rectal bleeding earlier today but it was becoming more brownish in color.  

Tagged red blood cell scan is negative.  We'll follow.





Objective





- Vital Signs


Vital signs: 


                                   Vital Signs











Temp  96.7 F L  11/08/22 08:00


 


Pulse  108 H  11/08/22 14:00


 


Resp  19   11/08/22 14:00


 


BP  117/64   11/08/22 14:00


 


Pulse Ox  94 L  11/08/22 14:00


 


FiO2      








                                 Intake & Output











 11/07/22 11/08/22 11/08/22





 18:59 06:59 18:59


 


Intake Total 1408 490 730


 


Output Total 500 500 200


 


Balance 908 -10 530


 


Weight 83.2 kg 84.6 kg 


 


Intake:   


 


   240 580


 


    Sodium Chloride 0.9% 1, 380 240 480





    000 ml @ 100 mls/hr IV .   





    Q10H PETER Rx#:[id number]   


 


    cefTRIAXone 1 gm In 50  100





    Sodium Chloride 0.9% 50   





    ml @ 100 mls/hr IVPB   





    Q24HR PETER Rx#:[id number]   


 


  Oral 300 250 150


 


  Blood Product 678  


 


    Platelet Pheresis Pas 339  





    Psoralen  Unit   





    [id number]   


 


Output:   


 


  Urine 500 500 200


 


Other:   


 


  Voiding Method External Catheter External Catheter External Catheter


 


  # Voids 1  1


 


  # Bowel Movements 1 1 1














- Labs


CBC & Chem 7: 


                                 11/08/22 08:19





                                 11/08/22 08:19


Labs: 


                  Abnormal Lab Results - Last 24 Hours (Table)











  11/06/22 11/07/22 11/07/22 Range/Units





  12:35 16:36 17:12 


 


WBC    3.4 L  (3.8-10.6)  k/uL


 


RBC    2.86 L  (4.30-5.90)  m/uL


 


Hgb    9.0 L  (13.0-17.5)  gm/dL


 


Hct    25.9 L  (39.0-53.0)  %


 


RDW    18.6 H  (11.5-15.5)  %


 


Plt Count    81 L D  (150-450)  k/uL


 


Lymphocytes # (Manual)    0.37 L  (1.0-4.8)  k/uL


 


Metamyelocytes # (Man)     (0)  k/uL


 


Nucleated RBCs     (0-0)  /100 WBC


 


Chloride     ()  mmol/L


 


BUN     (9-20)  mg/dL


 


Creatinine     (0.66-1.25)  mg/dL


 


Glucose     (74-99)  mg/dL


 


POC Glucose (mg/dL)   183 H   ()  mg/dL


 


Calcium     (8.4-10.2)  mg/dL


 


Crossmatch  See Detail    














  11/07/22 11/07/22 11/08/22 Range/Units





  19:55 23:01 06:07 


 


WBC     (3.8-10.6)  k/uL


 


RBC   2.75 L   (4.30-5.90)  m/uL


 


Hgb   8.5 L   (13.0-17.5)  gm/dL


 


Hct   24.2 L   (39.0-53.0)  %


 


RDW   18.5 H   (11.5-15.5)  %


 


Plt Count   60 L   (150-450)  k/uL


 


Lymphocytes # (Manual)   0.34 L   (1.0-4.8)  k/uL


 


Metamyelocytes # (Man)   0.04 H   (0)  k/uL


 


Nucleated RBCs   1 H   (0-0)  /100 WBC


 


Chloride     ()  mmol/L


 


BUN     (9-20)  mg/dL


 


Creatinine     (0.66-1.25)  mg/dL


 


Glucose     (74-99)  mg/dL


 


POC Glucose (mg/dL)  214 H   136 H  ()  mg/dL


 


Calcium     (8.4-10.2)  mg/dL


 


Crossmatch     














  11/08/22 11/08/22 11/08/22 Range/Units





  08:19 08:19 12:04 


 


WBC     (3.8-10.6)  k/uL


 


RBC  2.95 L    (4.30-5.90)  m/uL


 


Hgb  9.1 L    (13.0-17.5)  gm/dL


 


Hct  26.3 L    (39.0-53.0)  %


 


RDW  18.5 H    (11.5-15.5)  %


 


Plt Count  54 L    (150-450)  k/uL


 


Lymphocytes # (Manual)  0.30 L    (1.0-4.8)  k/uL


 


Metamyelocytes # (Man)     (0)  k/uL


 


Nucleated RBCs     (0-0)  /100 WBC


 


Chloride   109 H   ()  mmol/L


 


BUN   8 L   (9-20)  mg/dL


 


Creatinine   0.51 L   (0.66-1.25)  mg/dL


 


Glucose   125 H   (74-99)  mg/dL


 


POC Glucose (mg/dL)    232 H  ()  mg/dL


 


Calcium   7.5 L   (8.4-10.2)  mg/dL


 


Crossmatch     








                      Microbiology - Last 24 Hours (Table)











 11/05/22 17:30 Blood Culture - Preliminary





 Blood    No Growth after 48 hours

## 2022-11-08 NOTE — P.PN
Subjective


Progress Note Date: 11/08/22


Principal diagnosis: 





GI bleed





In f/u today pt reports that he has seen some blood in his stool, states that 

the color of the stool is brown.  No other bleeding to report.





Objective





- Vital Signs


Vital signs: 


                                   Vital Signs











Temp  96.8 F L  11/08/22 16:00


 


Pulse  109 H  11/08/22 20:11


 


Resp  28 H  11/08/22 19:00


 


BP  147/71   11/08/22 19:00


 


Pulse Ox  93 L  11/08/22 19:49


 


FiO2      








                                 Intake & Output











 11/08/22 11/08/22 11/09/22





 06:59 18:59 06:59


 


Intake Total 490 1130 


 


Output Total 500 500 


 


Balance -10 630 


 


Weight 84.6 kg  


 


Intake:   


 


   980 


 


    Sodium Chloride 0.9% 1, 240 880 





    000 ml @ 100 mls/hr IV .   





    Q10H [facility] Rx#:[id number]   


 


    cefTRIAXone 1 gm In  100 





    Sodium Chloride 0.9% 50   





    ml @ 100 mls/hr IVPB   





    Q24HR [facility] Rx#:[id number]   


 


  Oral 250 150 


 


Output:   


 


  Urine 500 500 


 


Other:   


 


  Voiding Method External Catheter External Catheter 


 


  # Voids  1 


 


  # Bowel Movements 1 1 














- Constitutional


General appearance: Present: cooperative, no acute distress, obese





- EENT


Eyes: Present: anicteric sclerae, EOMI


ENT: Present: hearing grossly normal





- Respiratory


Details: 





respirations even and unlabored at rest





- Gastrointestinal


General gastrointestinal: Present: normal bowel sounds, soft.  Absent: absent 

bowel sounds, decreased bowel sounds, distended, hepatomegaly, hyperactive bowel

sounds, organomegaly, rigid, scaphoid, splenomegaly, tenderness, umbilical 

hernia, ventral hernia





- Neurologic


Neurologic: Present: CNII-XII intact





- Musculoskeletal


Musculoskeletal: Present: generalized weakness, strength equal bilaterally





- Psychiatric


Psychiatric: Present: A&O x's 3, appropriate affect, intact judgment & insight





- Labs


CBC & Chem 7: 


                                 11/08/22 08:19





                                 11/08/22 08:19


Labs: 


                  Abnormal Lab Results - Last 24 Hours (Table)











  11/06/22 11/07/22 11/08/22 Range/Units





  12:35 23:01 06:07 


 


RBC   2.75 L   (4.30-5.90)  m/uL


 


Hgb   8.5 L   (13.0-17.5)  gm/dL


 


Hct   24.2 L   (39.0-53.0)  %


 


RDW   18.5 H   (11.5-15.5)  %


 


Plt Count   60 L   (150-450)  k/uL


 


Lymphocytes # (Manual)   0.34 L   (1.0-4.8)  k/uL


 


Metamyelocytes # (Man)   0.04 H   (0)  k/uL


 


Nucleated RBCs   1 H   (0-0)  /100 WBC


 


Chloride     ()  mmol/L


 


BUN     (9-20)  mg/dL


 


Creatinine     (0.66-1.25)  mg/dL


 


Glucose     (74-99)  mg/dL


 


POC Glucose (mg/dL)    136 H  ()  mg/dL


 


Calcium     (8.4-10.2)  mg/dL


 


Crossmatch  See Detail    














  11/08/22 11/08/22 11/08/22 Range/Units





  08:19 08:19 12:04 


 


RBC  2.95 L    (4.30-5.90)  m/uL


 


Hgb  9.1 L    (13.0-17.5)  gm/dL


 


Hct  26.3 L    (39.0-53.0)  %


 


RDW  18.5 H    (11.5-15.5)  %


 


Plt Count  54 L    (150-450)  k/uL


 


Lymphocytes # (Manual)  0.30 L    (1.0-4.8)  k/uL


 


Metamyelocytes # (Man)     (0)  k/uL


 


Nucleated RBCs     (0-0)  /100 WBC


 


Chloride   109 H   ()  mmol/L


 


BUN   8 L   (9-20)  mg/dL


 


Creatinine   0.51 L   (0.66-1.25)  mg/dL


 


Glucose   125 H   (74-99)  mg/dL


 


POC Glucose (mg/dL)    232 H  ()  mg/dL


 


Calcium   7.5 L   (8.4-10.2)  mg/dL


 


Crossmatch     














  11/08/22 11/08/22 Range/Units





  16:51 20:31 


 


RBC    (4.30-5.90)  m/uL


 


Hgb    (13.0-17.5)  gm/dL


 


Hct    (39.0-53.0)  %


 


RDW    (11.5-15.5)  %


 


Plt Count    (150-450)  k/uL


 


Lymphocytes # (Manual)    (1.0-4.8)  k/uL


 


Metamyelocytes # (Man)    (0)  k/uL


 


Nucleated RBCs    (0-0)  /100 WBC


 


Chloride    ()  mmol/L


 


BUN    (9-20)  mg/dL


 


Creatinine    (0.66-1.25)  mg/dL


 


Glucose    (74-99)  mg/dL


 


POC Glucose (mg/dL)  138 H  142 H  ()  mg/dL


 


Calcium    (8.4-10.2)  mg/dL


 


Crossmatch    








                      Microbiology - Last 24 Hours (Table)











 11/08/22 08:40 Sputum Culture - Preliminary





 Sputum 


 


 11/05/22 17:30 Blood Culture - Preliminary





 Blood    No Growth after 48 hours














Assessment and Plan


(1) GI bleed


Current Visit: Yes   Status: Acute   Priority: High   Code(s): K92.2 - 

GASTROINTESTINAL HEMORRHAGE, UNSPECIFIED   SNOMED Code(s): 90121039


   





(2) Pancytopenia due to antineoplastic chemotherapy


Current Visit: Yes   Status: Acute   Priority: High   Code(s): D61.810 - 

ANTINEOPLASTIC CHEMOTHERAPY INDUCED PANCYTOPENIA; T45.1X5A - ADVERSE EFFECT OF 

ANTINEOPLASTIC AND IMMUNOSUP DRUGS, INIT   SNOMED Code(s): 026896661239470


   





(3) Primary small cell malignant neoplasm of lung, stage 4


Current Visit: Yes   Status: Acute   Priority: High   Code(s): C34.90 - 

MALIGNANT NEOPLASM OF UNSP PART OF UNSP BRONCHUS OR LUNG   SNOMED Code(s): 

30002521423459


   


Plan: 





Pancytopenia secondary to recent treatment with zepzelca.  patient did receive 

G-CSF.  WBCs 5.9 today.  G-CSF discontinued.  Hemoglobin 9.1 today.  No 

transfusion needed.  Platelets 54,000 today.  He had CBC 3 times yesterday.  Pt 

has received 5 units of PRBCs and 5 units of platelets, last platelet infusion 

11/7, last PRBC transfusion 11/6.  Plans for tagged RBC scan today.  Will 

recheck coags and check fibrinogen.  Patient has been seen by Surgery.  Reports 

plans for transfer to [facility].


Shortness of breath.  Patient seems to be less short of breath today on 

examination.


Patient just started zepzelca, CTA reports some improvement in tumor size.  

Would like to be able to continue patient on the same.  there are dose 

reductions, that may be a possibility.  G-CSF can be added after treatment.  

Will see how patient does during hospital course/results of transfer.


Continue pain medications as prescribed from the office. Pain seems to be 

manageable at this time.

## 2022-11-08 NOTE — IR
PICC LINE PLACEMENT:

 

HISTORY:  Infection requiring long-term antibiotic therapy

 

PROCEDURE:  Ultrasound guidance of PICC line placement.

 

:  Dr. Cameron.

 

COMPLICATIONS:  None

 

ANESTHESIA:  1. 1% Lidocaine locally.

 

FINDINGS/TECHNIQUE:  The procedure was explained to the patient.  The risks, complications, benefits 
and alternatives were discussed and any questions were answered.  Informed consent was obtained.  The
 patient was placed supine on the fluoroscopic table and prepped and draped in the usual sterile fas
ion.  Utilizing a  21 gauge needle and sonographic guidance, access in the left basilic vein was achi
eved and there is placement of a 0.018 guidewire.  The vein is patent.  A 5-F. sheath was placed over
 the guidewire.  The guidewire and dilator were removed and a 5-F. Double lumen PICC line was placed 
through the sheath with the chest x-ray confirming the tip at the level of the SVC.  The sheath was r
emoved, the catheter was flushed and sutured into position.  The patient was stable throughout the pr
ocedure and remained stable upon discharge from the Department of Radiology. 

 

The vein puncture was patent under ultrasound. A gray scale image was obtained to document patency of
 the vein punctured.

 

All elements of the maximal barrier technique were utilized.

 

IMPRESSION: 

1. Successful PICC line placement under ultrasound performed bedside within the ICU.

## 2022-11-08 NOTE — NM
EXAMINATION TYPE: NM GI bleeding

 

DATE OF EXAM: 11/8/2022 4:20 PM

 

CLINICAL INDICATION:Male, 62 years old with history of GIB;

 

COMPARISON: CT abdomen pelvis. Following administration of 3 ml PYP 25.3 mCi Tc 99m Sodium Pertechnat
e. Immediate images post injection.

 

FINDINGS: 

Normal tracer activity is seen in the blood pool of the abdominal aorta, common iliac arteries, femor
al arteries, liver, and spleen on all of the interval images. Later images show accumulation of trace
r in the urinary bladder, which is consistent with excreted tracer. No abnormal tracer uptake is pres
ent outside the blood pool that would be consistent with an active GI bleed.

 

 

Suspected. Tracer uptake in the expected location of the stomach likely on the basis of free pertechn
etate.

 

IMPRESSION:

Negative examination. No evidence of active gastrointestinal bleeding during the initial 1 hr observa
tion period.

## 2022-11-08 NOTE — P.PN
Subjective


Progress Note Date: 11/08/22


Principal diagnosis: 





Acute GI bleeding





This is a 62-year-old white male with history of small cell lung cancer, 

presented initially back in 2021 with a large pleural effusion requiring 

thoracentesis and the diagnosis was made.  Patient received chemotherapy by 

oncology, and has been receiving chemotherapy maintenance.  Patient was admitted

on 11//22, mostly with shortness of breath, and multiple episodes of bloody 

stools.  Patient required so far multiple blood transfusions including 5 units 

of packed RBCs and 4 units of platelets, patient was noted to be pancytopenic, 

admitted because of the GI bleeding, and we were asked to see him on 

consultation today as the patient had worsening episodes of bleeding and 

required transfer to the ICU.  He was seen by general surgery, the plan is to 

consider tagged RBC study, no plans to have any intervention.  No GI coverage 

available, patient is being considered for transfer to [facility] once 

a bed becomes available.  Considering the patient was admitted to the ICU, I was

asked to see him on consultation.  Pulmonary-wise, the patient does not seem to 

be in any distress, he seems to be very comfortable.  His hemoglobin this 

morning is 9.0, WBC count is 2.1, platelets are 42,000.  Patient is being 

followed by hematology/oncology.  CT angiogram of the chest showed no evidence 

of pulmonary embolism, patient had bilateral reticular pulmonary interstitial 

infiltrates, and he had left upper lobe spiculated abnormality consistent with 

bronchogenic carcinoma, however seems to have a partial response to treatment





Reevaluated today on 11/8/22, patient remains in the ICU, continues to have 

intermittent episodes of lower GI bleeding, bright red blood per rectum.  Still 

waiting for transfer to [facility], patient was seen by general 

surgery, no immediate intervention is planned, however he may be undergoing 

tagged RBC study.  His hemoglobin this morning is 9.1, electrolytes are normal 

renal profile is normal WBC count is 5.9.  Patient received a total of 5 units 

of packed RBCs since admission and 5 units of platelets.  Platelets today are 

54,000.  Patient is comfortable, not in distress.











Objective





- Vital Signs


Vital signs: 


                                   Vital Signs











Temp  96.7 F L  11/08/22 08:00


 


Pulse  93   11/08/22 11:42


 


Resp  21   11/08/22 11:00


 


BP  114/62   11/08/22 11:00


 


Pulse Ox  94 L  11/08/22 11:00


 


FiO2      








                                 Intake & Output











 11/07/22 11/08/22 11/08/22





 18:59 06:59 18:59


 


Intake Total 1408 490 280


 


Output Total 500 500 


 


Balance 908 -10 280


 


Weight 83.2 kg 84.6 kg 


 


Intake:   


 


   240 280


 


    Sodium Chloride 0.9% 1, 380 240 180





    000 ml @ 100 mls/hr IV .   





    Q10H [facility] Rx#:[id number]   


 


    cefTRIAXone 1 gm In 50  100





    Sodium Chloride 0.9% 50   





    ml @ 100 mls/hr IVPB   





    Q24HR [facility] Rx#:[id number]   


 


  Oral 300 250 


 


  Blood Product 678  


 


    Platelet Pheresis Pas 339  





    Psoralen  Unit   





    [id number]   


 


Output:   


 


  Urine 500 500 


 


Other:   


 


  Voiding Method External Catheter External Catheter External Catheter


 


  # Voids 1  1


 


  # Bowel Movements 1 1 1














- Exam


General appearance: Revealed a 62-year-old white male in no distress.  On 4 L 

nasal cannula with O2 sat sugar 94%.


HET: Head is normocephalic and atraumatic.  Conjunctiva pink.  Sclera anicteric.


Neck: Supple without lymphadenopathy.  Trachea midline.


Heart: S1 S2.  Regular rate and rhythm.


Lungs: Clear to auscultation.  No crackles or rhonchi or wheezes.


Abdomen: Soft, nontender, nondistended with  bowel sounds.  No guarding or 

rigidity.


Skin:  No rashes. No jaundice.


Extremities: Normal skin color and turgor.  No pedal edema.


Neurological: No focal deficits.  Alert and oriented x3.


Psychiatric: Normal mood, affect and normal mental status examination.


Musculoskeletal: No deformities and no limitation in range of motion














- Labs


CBC & Chem 7: 


                                 11/08/22 08:19





                                 11/08/22 08:19


Labs: 


                  Abnormal Lab Results - Last 24 Hours (Table)











  11/06/22 11/07/22 11/07/22 Range/Units





  12:35 16:36 17:12 


 


WBC    3.4 L  (3.8-10.6)  k/uL


 


RBC    2.86 L  (4.30-5.90)  m/uL


 


Hgb    9.0 L  (13.0-17.5)  gm/dL


 


Hct    25.9 L  (39.0-53.0)  %


 


RDW    18.6 H  (11.5-15.5)  %


 


Plt Count    81 L D  (150-450)  k/uL


 


Lymphocytes # (Manual)    0.37 L  (1.0-4.8)  k/uL


 


Metamyelocytes # (Man)     (0)  k/uL


 


Nucleated RBCs     (0-0)  /100 WBC


 


Chloride     ()  mmol/L


 


BUN     (9-20)  mg/dL


 


Creatinine     (0.66-1.25)  mg/dL


 


Glucose     (74-99)  mg/dL


 


POC Glucose (mg/dL)   183 H   ()  mg/dL


 


Calcium     (8.4-10.2)  mg/dL


 


Crossmatch  See Detail    














  11/07/22 11/07/22 11/08/22 Range/Units





  19:55 23:01 06:07 


 


WBC     (3.8-10.6)  k/uL


 


RBC   2.75 L   (4.30-5.90)  m/uL


 


Hgb   8.5 L   (13.0-17.5)  gm/dL


 


Hct   24.2 L   (39.0-53.0)  %


 


RDW   18.5 H   (11.5-15.5)  %


 


Plt Count   60 L   (150-450)  k/uL


 


Lymphocytes # (Manual)   0.34 L   (1.0-4.8)  k/uL


 


Metamyelocytes # (Man)   0.04 H   (0)  k/uL


 


Nucleated RBCs   1 H   (0-0)  /100 WBC


 


Chloride     ()  mmol/L


 


BUN     (9-20)  mg/dL


 


Creatinine     (0.66-1.25)  mg/dL


 


Glucose     (74-99)  mg/dL


 


POC Glucose (mg/dL)  214 H   136 H  ()  mg/dL


 


Calcium     (8.4-10.2)  mg/dL


 


Crossmatch     














  11/08/22 11/08/22 Range/Units





  08:19 08:19 


 


WBC    (3.8-10.6)  k/uL


 


RBC  2.95 L   (4.30-5.90)  m/uL


 


Hgb  9.1 L   (13.0-17.5)  gm/dL


 


Hct  26.3 L   (39.0-53.0)  %


 


RDW  18.5 H   (11.5-15.5)  %


 


Plt Count  54 L   (150-450)  k/uL


 


Lymphocytes # (Manual)    (1.0-4.8)  k/uL


 


Metamyelocytes # (Man)    (0)  k/uL


 


Nucleated RBCs    (0-0)  /100 WBC


 


Chloride   109 H  ()  mmol/L


 


BUN   8 L  (9-20)  mg/dL


 


Creatinine   0.51 L  (0.66-1.25)  mg/dL


 


Glucose   125 H  (74-99)  mg/dL


 


POC Glucose (mg/dL)    ()  mg/dL


 


Calcium   7.5 L  (8.4-10.2)  mg/dL


 


Crossmatch    








                      Microbiology - Last 24 Hours (Table)











 11/05/22 17:30 Blood Culture - Preliminary





 Blood    No Growth after 48 hours














Assessment and Plan


Assessment: 





Impression:


Acute GI bleeding, persistent, exact etiology and source is not clear yet.  

Patient is being considered for a tagged RBC study.


Pancytopenia secondary to chemotherapy


Small cell lung cancer/metastatic.


History of underlying COPD/emphysema


History of degenerative joint disease and multiple orthopedic surgeries.


History of GERD.





Recommendation:


Continue to monitor in the ICU


Maintaining hemoglobin above 7, transfuse accordingly


Continue transfer plans to [facility] 


Continue bronchodilators


Resume home meds


Prognosis is guarded, we'll continue to follow





Time with Patient: Less than 30

## 2022-11-09 LAB
ALBUMIN SERPL-MCNC: 2.1 G/DL (ref 3.5–5)
ALP SERPL-CCNC: 96 U/L (ref 38–126)
ALT SERPL-CCNC: 39 U/L (ref 4–49)
ANION GAP SERPL CALC-SCNC: 1 MMOL/L
APTT BLD: 22.5 SEC (ref 22–30)
AST SERPL-CCNC: 26 U/L (ref 17–59)
BASOPHILS # BLD AUTO: 0 K/UL (ref 0–0.2)
BASOPHILS NFR BLD AUTO: 0 %
BUN SERPL-SCNC: 9 MG/DL (ref 9–20)
CALCIUM SPEC-MCNC: 6.8 MG/DL (ref 8.4–10.2)
CHLORIDE SERPL-SCNC: 110 MMOL/L (ref 98–107)
CO2 SERPL-SCNC: 26 MMOL/L (ref 22–30)
EOSINOPHIL # BLD AUTO: 0 K/UL (ref 0–0.7)
EOSINOPHIL NFR BLD AUTO: 0 %
ERYTHROCYTE [DISTWIDTH] IN BLOOD BY AUTOMATED COUNT: 2.38 M/UL (ref 4.3–5.9)
ERYTHROCYTE [DISTWIDTH] IN BLOOD BY AUTOMATED COUNT: 2.42 M/UL (ref 4.3–5.9)
ERYTHROCYTE [DISTWIDTH] IN BLOOD: 18.5 % (ref 11.5–15.5)
ERYTHROCYTE [DISTWIDTH] IN BLOOD: 18.7 % (ref 11.5–15.5)
FIBRINOGEN PPP-MCNC: 201 MG/DL (ref 200–500)
GLUCOSE BLD-MCNC: 140 MG/DL (ref 70–110)
GLUCOSE BLD-MCNC: 154 MG/DL (ref 70–110)
GLUCOSE BLD-MCNC: 176 MG/DL (ref 70–110)
GLUCOSE BLD-MCNC: 194 MG/DL (ref 70–110)
GLUCOSE SERPL-MCNC: 123 MG/DL (ref 74–99)
HCT VFR BLD AUTO: 21.2 % (ref 39–53)
HCT VFR BLD AUTO: 21.4 % (ref 39–53)
HGB BLD-MCNC: 7.5 GM/DL (ref 13–17.5)
HGB BLD-MCNC: 7.6 GM/DL (ref 13–17.5)
INR PPP: 1 (ref ?–1.2)
LYMPHOCYTES # SPEC AUTO: 0.4 K/UL (ref 1–4.8)
LYMPHOCYTES NFR SPEC AUTO: 4 %
MCH RBC QN AUTO: 31.4 PG (ref 25–35)
MCH RBC QN AUTO: 31.6 PG (ref 25–35)
MCHC RBC AUTO-ENTMCNC: 35.3 G/DL (ref 31–37)
MCHC RBC AUTO-ENTMCNC: 35.6 G/DL (ref 31–37)
MCV RBC AUTO: 88.6 FL (ref 80–100)
MCV RBC AUTO: 89 FL (ref 80–100)
MONOCYTES # BLD AUTO: 0.6 K/UL (ref 0–1)
MONOCYTES NFR BLD AUTO: 7 %
NEUTROPHILS # BLD AUTO: 7.7 K/UL (ref 1.3–7.7)
NEUTROPHILS NFR BLD AUTO: 85 %
PLATELET # BLD AUTO: 20 K/UL (ref 150–450)
PLATELET # BLD AUTO: 24 K/UL (ref 150–450)
POTASSIUM SERPL-SCNC: 3.2 MMOL/L (ref 3.5–5.1)
PROT SERPL-MCNC: 4.1 G/DL (ref 6.3–8.2)
PT BLD: 11.1 SEC (ref 9–12)
SODIUM SERPL-SCNC: 137 MMOL/L (ref 137–145)
WBC # BLD AUTO: 10 K/UL (ref 3.8–10.6)
WBC # BLD AUTO: 9.1 K/UL (ref 3.8–10.6)

## 2022-11-09 RX ADMIN — POTASSIUM CHLORIDE SCH MEQ: 20 TABLET, EXTENDED RELEASE ORAL at 17:42

## 2022-11-09 RX ADMIN — POTASSIUM CHLORIDE SCH MEQ: 20 TABLET, EXTENDED RELEASE ORAL at 11:05

## 2022-11-09 RX ADMIN — POTASSIUM CHLORIDE SCH MEQ: 20 TABLET, EXTENDED RELEASE ORAL at 09:17

## 2022-11-09 RX ADMIN — HYDROCORTISONE SODIUM SUCCINATE SCH MG: 100 INJECTION, POWDER, FOR SOLUTION INTRAMUSCULAR; INTRAVENOUS at 09:17

## 2022-11-09 RX ADMIN — ONDANSETRON PRN MG: 2 INJECTION INTRAMUSCULAR; INTRAVENOUS at 19:26

## 2022-11-09 RX ADMIN — IPRATROPIUM BROMIDE AND ALBUTEROL SULFATE SCH ML: .5; 3 SOLUTION RESPIRATORY (INHALATION) at 15:12

## 2022-11-09 RX ADMIN — INSULIN ASPART SCH UNIT: 100 INJECTION, SOLUTION INTRAVENOUS; SUBCUTANEOUS at 20:29

## 2022-11-09 RX ADMIN — SUCRALFATE SCH GM: 1 TABLET ORAL at 20:30

## 2022-11-09 RX ADMIN — IPRATROPIUM BROMIDE AND ALBUTEROL SULFATE SCH ML: .5; 3 SOLUTION RESPIRATORY (INHALATION) at 19:37

## 2022-11-09 RX ADMIN — MORPHINE SULFATE PRN MG: 15 TABLET ORAL at 04:52

## 2022-11-09 RX ADMIN — PANTOPRAZOLE SODIUM SCH MG: 40 INJECTION, POWDER, FOR SOLUTION INTRAVENOUS at 09:17

## 2022-11-09 RX ADMIN — GABAPENTIN SCH MG: 400 CAPSULE ORAL at 09:18

## 2022-11-09 RX ADMIN — SUCRALFATE SCH GM: 1 TABLET ORAL at 12:56

## 2022-11-09 RX ADMIN — HYDROCORTISONE SODIUM SUCCINATE SCH MG: 100 INJECTION, POWDER, FOR SOLUTION INTRAMUSCULAR; INTRAVENOUS at 16:30

## 2022-11-09 RX ADMIN — INSULIN ASPART SCH UNIT: 100 INJECTION, SOLUTION INTRAVENOUS; SUBCUTANEOUS at 17:53

## 2022-11-09 RX ADMIN — POTASSIUM CHLORIDE SCH MEQ: 20 TABLET, EXTENDED RELEASE ORAL at 18:43

## 2022-11-09 RX ADMIN — GABAPENTIN SCH MG: 400 CAPSULE ORAL at 20:29

## 2022-11-09 RX ADMIN — SUCRALFATE SCH GM: 1 TABLET ORAL at 05:18

## 2022-11-09 RX ADMIN — INSULIN ASPART SCH: 100 INJECTION, SOLUTION INTRAVENOUS; SUBCUTANEOUS at 07:02

## 2022-11-09 RX ADMIN — OXYCODONE HYDROCHLORIDE AND ACETAMINOPHEN PRN EACH: 10; 325 TABLET ORAL at 21:44

## 2022-11-09 RX ADMIN — HYDROCORTISONE SODIUM SUCCINATE SCH MG: 100 INJECTION, POWDER, FOR SOLUTION INTRAMUSCULAR; INTRAVENOUS at 00:18

## 2022-11-09 RX ADMIN — INSULIN ASPART SCH UNIT: 100 INJECTION, SOLUTION INTRAVENOUS; SUBCUTANEOUS at 12:57

## 2022-11-09 RX ADMIN — METOPROLOL TARTRATE SCH MG: 25 TABLET, FILM COATED ORAL at 20:30

## 2022-11-09 RX ADMIN — POTASSIUM CHLORIDE SCH MEQ: 20 TABLET, EXTENDED RELEASE ORAL at 16:30

## 2022-11-09 RX ADMIN — CEFAZOLIN SCH MLS/HR: 330 INJECTION, POWDER, FOR SOLUTION INTRAMUSCULAR; INTRAVENOUS at 20:30

## 2022-11-09 RX ADMIN — BUDESONIDE SCH MG: 1 SUSPENSION RESPIRATORY (INHALATION) at 19:37

## 2022-11-09 RX ADMIN — BUDESONIDE SCH MG: 0.25 SUSPENSION RESPIRATORY (INHALATION) at 07:42

## 2022-11-09 RX ADMIN — IPRATROPIUM BROMIDE AND ALBUTEROL SULFATE PRN ML: .5; 3 SOLUTION RESPIRATORY (INHALATION) at 07:42

## 2022-11-09 RX ADMIN — POTASSIUM CHLORIDE SCH MEQ: 20 TABLET, EXTENDED RELEASE ORAL at 22:08

## 2022-11-09 RX ADMIN — FORMOTEROL FUMARATE DIHYDRATE SCH MCG: 20 SOLUTION RESPIRATORY (INHALATION) at 19:37

## 2022-11-09 RX ADMIN — SUCRALFATE SCH GM: 1 TABLET ORAL at 17:42

## 2022-11-09 RX ADMIN — METOPROLOL TARTRATE SCH MG: 25 TABLET, FILM COATED ORAL at 09:18

## 2022-11-09 RX ADMIN — CEFAZOLIN SCH MLS/HR: 330 INJECTION, POWDER, FOR SOLUTION INTRAMUSCULAR; INTRAVENOUS at 01:30

## 2022-11-09 RX ADMIN — IPRATROPIUM BROMIDE AND ALBUTEROL SULFATE SCH ML: .5; 3 SOLUTION RESPIRATORY (INHALATION) at 11:17

## 2022-11-09 NOTE — P.PN
Subjective


Progress Note Date: 11/09/22





CHIEF COMPLAINT: Acute GI bleeding





HISTORY OF PRESENT ILLNESS: Patient remains in the ICU.  Patient bowel movements

are now brown.  He had 4 bowel movements total yesterday that initially had 

blood and transitioned to brown in color.  Patient denies any abdominal pain.  

He denies any nausea or vomiting.  Patient is receiving Lasix for fluid 

overload.  Fluids have been hep-locked.  His potassium is being replaced.  His 

tagged RBC scan was negative for bleeding.  WBC is 9.1 hemoglobin did drop from 

9.1-7.5 platelets are 24 sodium 137 potassium is 3.2 creatinine 0.48





Patient seen and examined with Dr. Mcbride





PHYSICAL EXAM: 


VITAL SIGNS: Reviewed.


GENERAL: Well-developed in no acute distress. 


HEENT:  No sclera icterus. Extraocular movements grossly intact.  Moist buccal 

mucosa. Head is atraumatic, normocephalic. 


ABDOMEN:  Soft.  Nondistended. Nontender. 


NEUROLOGIC: Alert and oriented. Cranial nerves II through XII grossly intact.





ASSESSMENT: 


1.  Acute GI bleed with bright red blood per rectum status post blood 

transfusion


2.  History of small cell lung cancer


3.  Pancytopenia 








PLAN: 


-Patient has repeat CBC ordered for this evening.  If hemoglobin less than 7 

recommend blood transfusion


-Continue to monitor hemoglobin


-Continue monitor for any signs or symptoms of bleeding


-Continue PPI


-Continue transfer plans to [facility]


-Continue clear liquids


-No plans for endoscopy





Physician Assistant note has been reviewed by physician. Signing provider agrees

with the documented findings, assessment, and plan of care. 





Objective





- Vital Signs


Vital signs: 


                                   Vital Signs











Temp  98.1 F   11/09/22 12:00


 


Pulse  100   11/09/22 14:00


 


Resp  18   11/09/22 14:00


 


BP  101/65   11/09/22 14:00


 


Pulse Ox  92 L  11/09/22 14:00


 


FiO2      








                                 Intake & Output











 11/08/22 11/09/22 11/09/22





 18:59 06:59 18:59


 


Intake Total 1130 810 380


 


Output Total 


 


Balance 630 460 -2020


 


Weight  86.2 kg 


 


Intake:   


 


   810 380


 


    Sodium Chloride 0.9% 1, 880 810 330





    000 ml @ 20 mls/hr IV .   





    Q24H [facility] Rx#:[id number]   


 


    cefTRIAXone 1 gm In 100  50





    Sodium Chloride 0.9% 50   





    ml @ 100 mls/hr IVPB   





    Q24HR [facility] Rx#:[id number]   


 


  Oral 150  


 


Output:   


 


  Urine 


 


  Stool   50


 


Other:   


 


  Voiding Method External Catheter External Catheter External Catheter


 


  # Voids 1  1


 


  # Bowel Movements 1  1














- Labs


CBC & Chem 7: 


                                 11/09/22 06:28





                                 11/09/22 06:28


Labs: 


                  Abnormal Lab Results - Last 24 Hours (Table)











  11/08/22 11/08/22 11/09/22 Range/Units





  16:51 20:31 06:28 


 


RBC    2.38 L  (4.30-5.90)  m/uL


 


Hgb    7.5 L D  (13.0-17.5)  gm/dL


 


Hct    21.2 L  (39.0-53.0)  %


 


RDW    18.7 H  (11.5-15.5)  %


 


Plt Count    24 L D  (150-450)  k/uL


 


Lymphocytes #    0.4 L  (1.0-4.8)  k/uL


 


Potassium     (3.5-5.1)  mmol/L


 


Chloride     ()  mmol/L


 


Creatinine     (0.66-1.25)  mg/dL


 


Glucose     (74-99)  mg/dL


 


POC Glucose (mg/dL)  138 H  142 H   ()  mg/dL


 


Calcium     (8.4-10.2)  mg/dL


 


Total Protein     (6.3-8.2)  g/dL


 


Albumin     (3.5-5.0)  g/dL














  11/09/22 11/09/22 11/09/22 Range/Units





  06:28 06:59 11:43 


 


RBC     (4.30-5.90)  m/uL


 


Hgb     (13.0-17.5)  gm/dL


 


Hct     (39.0-53.0)  %


 


RDW     (11.5-15.5)  %


 


Plt Count     (150-450)  k/uL


 


Lymphocytes #     (1.0-4.8)  k/uL


 


Potassium  3.2 L    (3.5-5.1)  mmol/L


 


Chloride  110 H    ()  mmol/L


 


Creatinine  0.48 L    (0.66-1.25)  mg/dL


 


Glucose  123 H    (74-99)  mg/dL


 


POC Glucose (mg/dL)   140 H  154 H  ()  mg/dL


 


Calcium  6.8 L    (8.4-10.2)  mg/dL


 


Total Protein  4.1 L    (6.3-8.2)  g/dL


 


Albumin  2.1 L    (3.5-5.0)  g/dL








                      Microbiology - Last 24 Hours (Table)











 11/08/22 08:40 Gram Stain - Preliminary





 Sputum Sputum Culture - Preliminary





    Gram Neg Bacilli


 


 11/05/22 17:30 Blood Culture - Preliminary





 Blood    No Growth after 72 hours

## 2022-11-09 NOTE — P.PN
Subjective


Progress Note Date: 11/09/22


H&P Date: 11/03/22


Chief Complaint: Rectal bleeding


This is a pleasant 62-year-old gentleman with past medical history of metastatic

lung disease/ primary small cell malignant neoplasm of lung, stage IV presented 

to the ER with Sunil rectal bleeding X 3, accompanied by lower abdominal 

cramping and exertional shortness of breath.  Reports some nausea, denies 

emesis.  Also reports extensive workup of abdominal pain and per oncology 

attributing it to chemo effect. Last chemotherapy approximately 1 week ago. 

Chest CTA reported : no evidence of pulmonary embolism.  Mediastinal left 

bronchial adenopathy with improvement in the left hilar mass compared to old 

exam.  Slightly decreased left upper lobe spiculated infiltrate extending from 

the left pulmonary hilum to the left lung apex,compared to old exam.  Improved 

encasement of the left lower lobe pulmonary artery with tumor mass and artery 

narrowing compared to old exam.COPD. Increased patchy bilateral reticular 

pulmonary interstitial infiltrates compared to prior exam.  Increased small left

pleural effusion compared to prior exam.  Pericardial tumor masses not 

significantly different than last exam.  On admission :WBC 0.2 hemoglobin 6.5 

hematocrit 18.9 platelet count 5 INR 1.0 d-dimer 1.76 sodium 136 potassium 4.4 

BUN 12 creatinine 0.63 glucose 115, bilirubin 0.4 AST 23 ALT 22 alk phos 60. 

Repeat labs today WBC 0.1 hemoglobin 7.5 hematocrit 22 platelet count 7. 

Received 1 unit of packed red blood cell transfusion as well as 1 unit of 

platelets.  Denies any further rectal bleeding since admission.  Denies 

shortness of breath at rest.  Denies chest pain, palpitations.








11/04/2022 maintained on IV fluid hydration, Zarxio,PPI, carafate .reports dark 

maroon stooling 2 last night.  Denies any further bleeding this morning.  

Denies abdominal cramping, abdominal pain.  Consuming 25-75% with no nausea 

vomiting. Received a total of 3 units packed RBCs, 2 units of platelets.  WBC 

increased to 0.5, hemoglobin 7.9, platelets 28 .Afebrile.  Denies chest pain, 

palpitations or shortness of breath.  Complains of exertional shortness of 

breath.





Evaluated by both GI and oncology with recommendations noted and appreciated.  

Neutropenic precautions. Maintained on zaxrio,PPI, Carafate,empiric Augmentin 

and doxycycline. Transfusion parameters as per oncology. 





11/07/2022 Over the weekend, patient had reoccurrence of rectal bleeding 

accompanied by acute drop in hemoglobin to 6 and platelets to 8 requiring 

transfer into the ICU.Maintained on supportive transfusions to maintain 

hemoglobin greater than 7, platelets greater than 50.  Post transfusion, 

hemoglobin currently up to 9, platelets 42.  No bowel movement since yesterday 

.Denies abdominal pain .  Denies chest pain, palpitations or shortness of 

breath.  Chronic back pain controlled.  Patient in need of GI 

evaluation.Transfer to [facility],in progress related to GI services not 

available at this site, this week.  Prognosis guarded given multiple complex 

medical issues. 





11/08/2022 transfer to tertiary care center pending.  Patient continued to have 

maroon bowel movements yesterday, last night and throughout the midnight shift. 

Repeat blood counts of yesterday reported hemoglobin dropping to 8.5, platelets 

down to 60 with a.m. labs pending.  Patient reports chills, increased shortness 

of breath at rest. Maintaining O2 sats in the 90s on 4 L nasal cannula. Minimal 

cough, including dark brownish sputum-cultures sent; possible microscopic 

bleeding. Afebrile, WBC of yesterday 4.2.  Supportive transfusions.  Denies 

chest pain, palpitations. ICU management as per intensivist. Awaiting bed for 

transfer.








11/09/2022 hemoglobin decreased to 7.5, platelets 24, neutrophils 85.  No 

further stooling this morning.  Staff reports patient had 4 bowel movements with

minimal rectal bleeding yesterday that initially subsided-last bowel movement 

reported brown in color.  Denies abdominal pain.Increased shortness of breath at

rest with oxygen requirements worsened, requiring 8 L high flow nasal cannula to

maintain O2 sats in the low 90s.  Mild tachycardia this morning with heart rates

in the 1 teens.  Afebrile, normal WBC.  Receiving potassium supplements for 

potassium 3.2.  Renal function stable.  Blood sugars controlled.  Chest x-ray 

reporting bilateral pleural thickening/interstitial edema, suspect chronic idi

opathic pulmonary fibrosis. IV fluids pivl'd, received a dose of Lasix IV push.





Objective





- Vital Signs


Vital signs: 


                                   Vital Signs











Temp  98.1 F   11/09/22 12:00


 


Pulse  98   11/09/22 12:00


 


Resp  24   11/09/22 12:00


 


BP  93/61   11/09/22 12:00


 


Pulse Ox  93 L  11/09/22 12:00


 


FiO2      








                                 Intake & Output











 11/08/22 11/09/22 11/09/22





 18:59 06:59 18:59


 


Intake Total 1130 810 340


 


Output Total 


 


Balance 630 460 -1860


 


Weight  86.2 kg 


 


Intake:   


 


   810 340


 


    Sodium Chloride 0.9% 1, 880 810 290





    000 ml @ 20 mls/hr IV .   





    Q24H [facility] Rx#:[id number]   


 


    cefTRIAXone 1 gm In 100  50





    Sodium Chloride 0.9% 50   





    ml @ 100 mls/hr IVPB   





    Q24HR [facility] Rx#:[id number]   


 


  Oral 150  


 


Output:   


 


  Urine 


 


  Stool   50


 


Other:   


 


  Voiding Method External Catheter External Catheter External Catheter


 


  # Voids 1  1


 


  # Bowel Movements 1  














- Exam





- Exam


PHYSICAL EXAM:


VITAL SIGNS: [As above]


GENERAL: Alert and oriented 3, Sitting up in bed,no acute distress


HEENT:  Conjunctivae normal. eyes normal.


NECK: Supple, No JVD.


CARDIOVASCULAR:  S1, S2 regular, mild tachycardia. No murmur.


RESPIRATION: Unlabored Breath sounds diminished in the bases. No rhonchi, 

crackles or wheezing. 


ABDOMEN:  Soft, nontender, No guarding.Bowel sounds heard.


LEGS: Mild edema


NERVOUS SYSTEM:  Cranial N 2-12 grossly normal.No focal deficits. Strength and 

sensation grossly intact.


Skin: Warm and dry, no rash 











- Labs


CBC & Chem 7: 


                                 11/09/22 06:28





                                 11/09/22 06:28


Labs: 


                  Abnormal Lab Results - Last 24 Hours (Table)











  11/08/22 11/08/22 11/09/22 Range/Units





  16:51 20:31 06:28 


 


RBC    2.38 L  (4.30-5.90)  m/uL


 


Hgb    7.5 L D  (13.0-17.5)  gm/dL


 


Hct    21.2 L  (39.0-53.0)  %


 


RDW    18.7 H  (11.5-15.5)  %


 


Plt Count    24 L D  (150-450)  k/uL


 


Lymphocytes #    0.4 L  (1.0-4.8)  k/uL


 


Potassium     (3.5-5.1)  mmol/L


 


Chloride     ()  mmol/L


 


Creatinine     (0.66-1.25)  mg/dL


 


Glucose     (74-99)  mg/dL


 


POC Glucose (mg/dL)  138 H  142 H   ()  mg/dL


 


Calcium     (8.4-10.2)  mg/dL


 


Total Protein     (6.3-8.2)  g/dL


 


Albumin     (3.5-5.0)  g/dL














  11/09/22 11/09/22 11/09/22 Range/Units





  06:28 06:59 11:43 


 


RBC     (4.30-5.90)  m/uL


 


Hgb     (13.0-17.5)  gm/dL


 


Hct     (39.0-53.0)  %


 


RDW     (11.5-15.5)  %


 


Plt Count     (150-450)  k/uL


 


Lymphocytes #     (1.0-4.8)  k/uL


 


Potassium  3.2 L    (3.5-5.1)  mmol/L


 


Chloride  110 H    ()  mmol/L


 


Creatinine  0.48 L    (0.66-1.25)  mg/dL


 


Glucose  123 H    (74-99)  mg/dL


 


POC Glucose (mg/dL)   140 H  154 H  ()  mg/dL


 


Calcium  6.8 L    (8.4-10.2)  mg/dL


 


Total Protein  4.1 L    (6.3-8.2)  g/dL


 


Albumin  2.1 L    (3.5-5.0)  g/dL








                      Microbiology - Last 24 Hours (Table)











 11/08/22 08:40 Gram Stain - Preliminary





 Sputum Sputum Culture - Preliminary


 


 11/05/22 17:30 Blood Culture - Preliminary





 Blood    No Growth after 72 hours














Assessment and Plan


Assessment: 


Acute GI bleed, status post transfusion of packed RBCs and platelets.  No 

endoscopy recommended per GI.


Pancytopenia secondary to recent treatment,Last received chemotherapy 1 week ago


Chronic abdominal, back pain due to metastatic lung disease in a patient with 

history of primary small cell malignant neoplasm of lung, stage IV.  Fentanyl 

patch dose increased last week with improvement in pain.


History of Adrenal mass likely secondary to metastatic lesion.


COPD


GERD


Anxiety/depression


Previous history of smoking


Adrenal insufficiency currently on Cortef at home.

















Plan: Continue on current medication regime ,monitoring and symptomatic 

treatment.  Close monitoring of coags.with repeat CBC ordered for 1600. Continue

with transfer process -accepted, awaiting bed at [facility]. [facility] declined 

secondary to bed status.  ICU management as per intensivist .Prognosis guarded 

given multiple complex medical issues.














The impression and plan of care has been dictated as directed.





:


I performed a history and examination of this patient,  discussed the same with 

the dictator.  I agree with the dictator's note ,documented as a scribe.  Any 

additional findings or plans will be noted.

## 2022-11-09 NOTE — P.PN
Subjective


Progress Note Date: 11/09/22


Principal diagnosis: 





Acute GI bleeding





This is a 62-year-old white male with history of small cell lung cancer, 

presented initially back in 2021 with a large pleural effusion requiring 

thoracentesis and the diagnosis was made.  Patient received chemotherapy by 

oncology, and has been receiving chemotherapy maintenance.  Patient was admitted

on 11//22, mostly with shortness of breath, and multiple episodes of bloody 

stools.  Patient required so far multiple blood transfusions including 5 units 

of packed RBCs and 4 units of platelets, patient was noted to be pancytopenic, 

admitted because of the GI bleeding, and we were asked to see him on 

consultation today as the patient had worsening episodes of bleeding and 

required transfer to the ICU.  He was seen by general surgery, the plan is to 

consider tagged RBC study, no plans to have any intervention.  No GI coverage 

available, patient is being considered for transfer to [facility] once 

a bed becomes available.  Considering the patient was admitted to the ICU, I was

asked to see him on consultation.  Pulmonary-wise, the patient does not seem to 

be in any distress, he seems to be very comfortable.  His hemoglobin this 

morning is 9.0, WBC count is 2.1, platelets are 42,000.  Patient is being 

followed by hematology/oncology.  CT angiogram of the chest showed no evidence 

of pulmonary embolism, patient had bilateral reticular pulmonary interstitial 

infiltrates, and he had left upper lobe spiculated abnormality consistent with 

bronchogenic carcinoma, however seems to have a partial response to treatment





Reevaluated today on 11/8/22, patient remains in the ICU, continues to have 

intermittent episodes of lower GI bleeding, bright red blood per rectum.  Still 

waiting for transfer to [facility], patient was seen by general 

surgery, no immediate intervention is planned, however he may be undergoing 

tagged RBC study.  His hemoglobin this morning is 9.1, electrolytes are normal 

renal profile is normal WBC count is 5.9.  Patient received a total of 5 units 

of packed RBCs since admission and 5 units of platelets.  Platelets today are 

54,000.  Patient is comfortable, not in distress.





Reevaluated today on 11/90/22, remains in the ICU, hemodynamically stable, 

nonetheless the patient continues to demonstrate slight bleeding but no active 

bleeding clinically.  Hemoglobin dropped today to 7.5 compared to yesterday.  

Hemoglobin was 9.1 yesterday, stools are black and brown.  Hopefully the patient

is not actively bleeding.  But that's not definitely certain at this point.  

Patient seems to be a bit more short of breath today compared to yesterday, 

chest x-ray is showing interstitial edema, and I'm recommending a dose of Lasix 

to be given.  Patient does have possibly some underlying interstitial lung 

disease











Objective





- Vital Signs


Vital signs: 


                                   Vital Signs











Temp  97.8 F   11/09/22 04:00


 


Pulse  94   11/09/22 11:28


 


Resp  22   11/09/22 11:00


 


BP  121/67   11/09/22 11:00


 


Pulse Ox  92 L  11/09/22 11:00


 


FiO2      








                                 Intake & Output











 11/08/22 11/09/22 11/09/22





 18:59 06:59 18:59


 


Intake Total 1130 810 320


 


Output Total 


 


Balance 630 460 -1830


 


Weight  86.2 kg 


 


Intake:   


 


   810 320


 


    Sodium Chloride 0.9% 1, 880 810 270





    000 ml @ 20 mls/hr IV .   





    Q24H [facility] Rx#:[id number]   


 


    cefTRIAXone 1 gm In 100  50





    Sodium Chloride 0.9% 50   





    ml @ 100 mls/hr IVPB   





    Q24HR [facility] Rx#:[id number]   


 


  Oral 150  


 


Output:   


 


  Urine 


 


Other:   


 


  Voiding Method External Catheter External Catheter External Catheter


 


  # Voids 1  1


 


  # Bowel Movements 1  














- Exam


General appearance: Revealed a 62-year-old white male in no distress.  On 4 L 

nasal cannula with O2 sat sugar 94%.


HET: Head is normocephalic and atraumatic.  Conjunctiva pink.  Sclera anicteric.


Neck: Supple without lymphadenopathy.  Trachea midline.


Heart: S1 S2.  Regular rate and rhythm.


Lungs: Clear to auscultation.  No crackles or rhonchi or wheezes.


Abdomen: Soft, nontender, nondistended with  bowel sounds.  No guarding or 

rigidity.


Skin:  No rashes. No jaundice.


Extremities: Normal skin color and turgor.  1+ bipedal edema


Neurological: No focal deficits.  Alert and oriented x3.


Psychiatric: Normal mood, affect and normal mental status examination.


Musculoskeletal: No deformities and no limitation in range of motion














- Labs


CBC & Chem 7: 


                                 11/09/22 06:28





                                 11/09/22 06:28


Labs: 


                  Abnormal Lab Results - Last 24 Hours (Table)











  11/08/22 11/08/22 11/09/22 Range/Units





  16:51 20:31 06:28 


 


RBC    2.38 L  (4.30-5.90)  m/uL


 


Hgb    7.5 L D  (13.0-17.5)  gm/dL


 


Hct    21.2 L  (39.0-53.0)  %


 


RDW    18.7 H  (11.5-15.5)  %


 


Plt Count    24 L D  (150-450)  k/uL


 


Lymphocytes #    0.4 L  (1.0-4.8)  k/uL


 


Potassium     (3.5-5.1)  mmol/L


 


Chloride     ()  mmol/L


 


Creatinine     (0.66-1.25)  mg/dL


 


Glucose     (74-99)  mg/dL


 


POC Glucose (mg/dL)  138 H  142 H   ()  mg/dL


 


Calcium     (8.4-10.2)  mg/dL


 


Total Protein     (6.3-8.2)  g/dL


 


Albumin     (3.5-5.0)  g/dL














  11/09/22 11/09/22 11/09/22 Range/Units





  06:28 06:59 11:43 


 


RBC     (4.30-5.90)  m/uL


 


Hgb     (13.0-17.5)  gm/dL


 


Hct     (39.0-53.0)  %


 


RDW     (11.5-15.5)  %


 


Plt Count     (150-450)  k/uL


 


Lymphocytes #     (1.0-4.8)  k/uL


 


Potassium  3.2 L    (3.5-5.1)  mmol/L


 


Chloride  110 H    ()  mmol/L


 


Creatinine  0.48 L    (0.66-1.25)  mg/dL


 


Glucose  123 H    (74-99)  mg/dL


 


POC Glucose (mg/dL)   140 H  154 H  ()  mg/dL


 


Calcium  6.8 L    (8.4-10.2)  mg/dL


 


Total Protein  4.1 L    (6.3-8.2)  g/dL


 


Albumin  2.1 L    (3.5-5.0)  g/dL








                      Microbiology - Last 24 Hours (Table)











 11/08/22 08:40 Gram Stain - Preliminary





 Sputum Sputum Culture - Preliminary


 


 11/05/22 17:30 Blood Culture - Preliminary





 Blood    No Growth after 72 hours














Assessment and Plan


Assessment: 





Impression:


Acute GI bleeding, persistent, exact etiology and source is not clear yet.  

Tagged RBC study is nondiagnostic.


Pancytopenia secondary to chemotherapy


Small cell lung cancer/metastatic.


History of underlying COPD/emphysema


History of degenerative joint disease and multiple orthopedic surgeries.


History of GERD.





Recommendation:


Continue to monitor in the ICU


Maintaining hemoglobin above 7, transfuse accordingly


Continue transfer plans to [facility] 


Continue bronchodilators


Prognosis is guarded, we'll continue to follow





Time with Patient: Less than 30

## 2022-11-09 NOTE — XR
EXAMINATION TYPE: XR chest 1V portable

 

DATE OF EXAM: 11/9/2022

 

COMPARISON: 11/8/2022

 

HISTORY: Shortness of breath

 

TECHNIQUE: Single frontal view of the chest is obtained.

 

FINDINGS:  Diffuse interstitial pattern with bilateral consolidation and pleural small effusion. Left
-sided PICC line noted. Postsurgical change right shoulder. Sclerotic change involving the left humer
al head likely represents bone infarct. Diffuse osteopenia. No pneumothorax. Previous vertebral plast
y.

 

IMPRESSION:  

1. Correlate for pulmonary fibrosis pleural thickening or small effusion stable exam. Superimposed in
terstitial pneumonitis difficult to exclude.

## 2022-11-09 NOTE — P.PN
Subjective


Progress Note Date: 11/09/22


Principal diagnosis: 





GI bleed.  SCLC, on treatment





In f/u today pt and staff report last BMs of the day yesterday were brown in 

color, no other bleeding from the rectum noted.  Pt is more SOB today, he cannot

talk without being SOB.  No chest pain or other bleeding to report.





Objective





- Vital Signs


Vital signs: 


                                   Vital Signs











Temp  97.8 F   11/09/22 04:00


 


Pulse  94   11/09/22 11:28


 


Resp  22   11/09/22 11:00


 


BP  121/67   11/09/22 11:00


 


Pulse Ox  92 L  11/09/22 11:00


 


FiO2      








                                 Intake & Output











 11/08/22 11/09/22 11/09/22





 18:59 06:59 18:59


 


Intake Total 1130 810 320


 


Output Total 


 


Balance 630 460 -1830


 


Weight  86.2 kg 


 


Intake:   


 


   810 320


 


    Sodium Chloride 0.9% 1, 880 810 270





    000 ml @ 20 mls/hr IV .   





    Q24H [facility] Rx#:[id number]   


 


    cefTRIAXone 1 gm In 100  50





    Sodium Chloride 0.9% 50   





    ml @ 100 mls/hr IVPB   





    Q24HR [facility] Rx#:[id number]   


 


  Oral 150  


 


Output:   


 


  Urine 


 


Other:   


 


  Voiding Method External Catheter External Catheter External Catheter


 


  # Voids 1  1


 


  # Bowel Movements 1  














- Constitutional


General appearance: Present: average body habitus, cooperative, mild distress





- EENT


Eyes: Present: anicteric sclerae, EOMI


ENT: Present: hearing grossly normal





- Respiratory


Respiratory: bilateral: CTA





- Cardiovascular


Rhythm: regular


Heart sounds: normal: S1, S2


Abnormal Heart Sounds: Absent: systolic murmur, diastolic murmur, rub, S3 

Gallop, S4 Gallop, click, other





- Peripheral edema


  ** leg


Peripheral Edema: bilateral: Trace





- Gastrointestinal


General gastrointestinal: Present: normal bowel sounds, soft





- Integumentary


Integumentary: Present: pale





- Neurologic


Neurologic: Present: CNII-XII intact





- Musculoskeletal


Musculoskeletal: Present: generalized weakness, strength equal bilaterally





- Psychiatric


Psychiatric: Present: A&O x's 3, appropriate affect, intact judgment & insight





- Labs


CBC & Chem 7: 


                                 11/09/22 06:28





                                 11/09/22 06:28


Labs: 


                  Abnormal Lab Results - Last 24 Hours (Table)











  11/08/22 11/08/22 11/09/22 Range/Units





  16:51 20:31 06:28 


 


RBC    2.38 L  (4.30-5.90)  m/uL


 


Hgb    7.5 L D  (13.0-17.5)  gm/dL


 


Hct    21.2 L  (39.0-53.0)  %


 


RDW    18.7 H  (11.5-15.5)  %


 


Plt Count    24 L D  (150-450)  k/uL


 


Lymphocytes #    0.4 L  (1.0-4.8)  k/uL


 


Potassium     (3.5-5.1)  mmol/L


 


Chloride     ()  mmol/L


 


Creatinine     (0.66-1.25)  mg/dL


 


Glucose     (74-99)  mg/dL


 


POC Glucose (mg/dL)  138 H  142 H   ()  mg/dL


 


Calcium     (8.4-10.2)  mg/dL


 


Total Protein     (6.3-8.2)  g/dL


 


Albumin     (3.5-5.0)  g/dL














  11/09/22 11/09/22 11/09/22 Range/Units





  06:28 06:59 11:43 


 


RBC     (4.30-5.90)  m/uL


 


Hgb     (13.0-17.5)  gm/dL


 


Hct     (39.0-53.0)  %


 


RDW     (11.5-15.5)  %


 


Plt Count     (150-450)  k/uL


 


Lymphocytes #     (1.0-4.8)  k/uL


 


Potassium  3.2 L    (3.5-5.1)  mmol/L


 


Chloride  110 H    ()  mmol/L


 


Creatinine  0.48 L    (0.66-1.25)  mg/dL


 


Glucose  123 H    (74-99)  mg/dL


 


POC Glucose (mg/dL)   140 H  154 H  ()  mg/dL


 


Calcium  6.8 L    (8.4-10.2)  mg/dL


 


Total Protein  4.1 L    (6.3-8.2)  g/dL


 


Albumin  2.1 L    (3.5-5.0)  g/dL








                      Microbiology - Last 24 Hours (Table)











 11/08/22 08:40 Gram Stain - Preliminary





 Sputum Sputum Culture - Preliminary


 


 11/05/22 17:30 Blood Culture - Preliminary





 Blood    No Growth after 72 hours














- Imaging and Cardiology





Tagged RBC scan report reviewed





Assessment and Plan


(1) GI bleed


Current Visit: Yes   Status: Acute   Priority: High   Code(s): K92.2 - 

GASTROINTESTINAL HEMORRHAGE, UNSPECIFIED   SNOMED Code(s): 52697542


   





(2) Pancytopenia due to antineoplastic chemotherapy


Current Visit: Yes   Status: Acute   Priority: High   Code(s): D61.810 - 

ANTINEOPLASTIC CHEMOTHERAPY INDUCED PANCYTOPENIA; T45.1X5A - ADVERSE EFFECT OF 

ANTINEOPLASTIC AND IMMUNOSUP DRUGS, INIT   SNOMED Code(s): 994271237909627


   





(3) Primary small cell malignant neoplasm of lung, stage 4


Current Visit: Yes   Status: Acute   Priority: High   Code(s): C34.90 - 

MALIGNANT NEOPLASM OF UNSP PART OF UNSP BRONCHUS OR LUNG   SNOMED Code(s): 

93492947115233


   


Plan: 





Pancytopenia secondary to recent treatment with zepzelca.  Patient did receive 

G-CSF x 6.  WBC 9.1, ANC 7.7 today.  G-CSF discontinued.  Pt has received 5 

units of platelets, plt count down to 24K today, was 54K yesterday.  DIC work up

this AM was neg.  Hemoglobin down to 7.5 today.  Providers have all weighed in 

on if to transfuse-pt just received a dose of lasix for fluid overload.  Pending

pt clinical course throughout the day, there is a CBC ordered for this 

afternoon.  


Tagged RBC scan was non diagnostic for a source of acute bleeding.  


Pt has received 5 units of PRBCs and 5 units of platelets, last platelet 

infusion 11/7, last PRBC transfusion 11/6.  


Patient has been seen by Surgery. 


There are reported plans for transfer to [facility]


Shortness of breath.  Patient is MORE short of breath today.  He has received 

lasix due to crackles/wheezing on resp exam earlier this AM.  Lungs sounds 

improved, clear on late morning exam.  


Patient just started zepzelca, CTA reports some improvement in tumor size when 

compared to imaging 10/1.  Would like to be able to continue patient on the 

same.  There are dose reductions so, continuation of therapy after pt has 

adequately recovered (clinically and hematologically) may be a possibility.  G-

CSF can be added as well if felt necessary.  Cont to follow hospital course.


Continue pain medications as prescribed from the office. Pain seems to be 

manageable at this time.  








 attests:  I have seen and examined pt, performed H&P, developed impression 

and plan of care.  Discussed with dictator.  Agree with documentation, dictated 

as a scribe.

## 2022-11-10 LAB
ANION GAP SERPL CALC-SCNC: -1 MMOL/L
BUN SERPL-SCNC: 9 MG/DL (ref 9–20)
CALCIUM SPEC-MCNC: 7.3 MG/DL (ref 8.4–10.2)
CHLORIDE SERPL-SCNC: 109 MMOL/L (ref 98–107)
CO2 SERPL-SCNC: 28 MMOL/L (ref 22–30)
ERYTHROCYTE [DISTWIDTH] IN BLOOD BY AUTOMATED COUNT: 2.38 M/UL (ref 4.3–5.9)
ERYTHROCYTE [DISTWIDTH] IN BLOOD BY AUTOMATED COUNT: 2.91 M/UL (ref 4.3–5.9)
ERYTHROCYTE [DISTWIDTH] IN BLOOD: 18.3 % (ref 11.5–15.5)
ERYTHROCYTE [DISTWIDTH] IN BLOOD: 19.2 % (ref 11.5–15.5)
GLUCOSE BLD-MCNC: 143 MG/DL (ref 70–110)
GLUCOSE BLD-MCNC: 164 MG/DL (ref 70–110)
GLUCOSE BLD-MCNC: 176 MG/DL (ref 70–110)
GLUCOSE BLD-MCNC: 242 MG/DL (ref 70–110)
GLUCOSE SERPL-MCNC: 144 MG/DL (ref 74–99)
HCT VFR BLD AUTO: 21.1 % (ref 39–53)
HCT VFR BLD AUTO: 25.3 % (ref 39–53)
HGB BLD-MCNC: 7.1 GM/DL (ref 13–17.5)
HGB BLD-MCNC: 8.9 GM/DL (ref 13–17.5)
MCH RBC QN AUTO: 30 PG (ref 25–35)
MCH RBC QN AUTO: 30.5 PG (ref 25–35)
MCHC RBC AUTO-ENTMCNC: 33.7 G/DL (ref 31–37)
MCHC RBC AUTO-ENTMCNC: 35.1 G/DL (ref 31–37)
MCV RBC AUTO: 87 FL (ref 80–100)
MCV RBC AUTO: 88.9 FL (ref 80–100)
PLATELET # BLD AUTO: 10 K/UL (ref 150–450)
PLATELET # BLD AUTO: 12 K/UL (ref 150–450)
POTASSIUM SERPL-SCNC: 4.1 MMOL/L (ref 3.5–5.1)
SODIUM SERPL-SCNC: 136 MMOL/L (ref 137–145)
WBC # BLD AUTO: 11.2 K/UL (ref 3.8–10.6)
WBC # BLD AUTO: 9.4 K/UL (ref 3.8–10.6)

## 2022-11-10 RX ADMIN — IPRATROPIUM BROMIDE AND ALBUTEROL SULFATE SCH ML: .5; 3 SOLUTION RESPIRATORY (INHALATION) at 07:18

## 2022-11-10 RX ADMIN — IPRATROPIUM BROMIDE AND ALBUTEROL SULFATE SCH ML: .5; 3 SOLUTION RESPIRATORY (INHALATION) at 20:17

## 2022-11-10 RX ADMIN — HYDROCORTISONE SODIUM SUCCINATE SCH MG: 100 INJECTION, POWDER, FOR SOLUTION INTRAMUSCULAR; INTRAVENOUS at 08:18

## 2022-11-10 RX ADMIN — IPRATROPIUM BROMIDE AND ALBUTEROL SULFATE SCH ML: .5; 3 SOLUTION RESPIRATORY (INHALATION) at 10:56

## 2022-11-10 RX ADMIN — CEFAZOLIN SCH: 330 INJECTION, POWDER, FOR SOLUTION INTRAMUSCULAR; INTRAVENOUS at 17:54

## 2022-11-10 RX ADMIN — BUDESONIDE SCH MG: 1 SUSPENSION RESPIRATORY (INHALATION) at 20:17

## 2022-11-10 RX ADMIN — SUCRALFATE SCH GM: 1 TABLET ORAL at 17:57

## 2022-11-10 RX ADMIN — FUROSEMIDE SCH MG: 10 INJECTION, SOLUTION INTRAMUSCULAR; INTRAVENOUS at 09:42

## 2022-11-10 RX ADMIN — SUCRALFATE SCH GM: 1 TABLET ORAL at 06:31

## 2022-11-10 RX ADMIN — GABAPENTIN SCH MG: 400 CAPSULE ORAL at 08:19

## 2022-11-10 RX ADMIN — POTASSIUM CHLORIDE SCH MEQ: 20 TABLET, EXTENDED RELEASE ORAL at 04:03

## 2022-11-10 RX ADMIN — INSULIN ASPART SCH UNIT: 100 INJECTION, SOLUTION INTRAVENOUS; SUBCUTANEOUS at 20:31

## 2022-11-10 RX ADMIN — ONDANSETRON PRN MG: 2 INJECTION INTRAMUSCULAR; INTRAVENOUS at 05:04

## 2022-11-10 RX ADMIN — BUDESONIDE SCH MG: 1 SUSPENSION RESPIRATORY (INHALATION) at 07:18

## 2022-11-10 RX ADMIN — HYDROCORTISONE SODIUM SUCCINATE SCH MG: 100 INJECTION, POWDER, FOR SOLUTION INTRAMUSCULAR; INTRAVENOUS at 00:14

## 2022-11-10 RX ADMIN — FORMOTEROL FUMARATE DIHYDRATE SCH MCG: 20 SOLUTION RESPIRATORY (INHALATION) at 20:17

## 2022-11-10 RX ADMIN — PANTOPRAZOLE SODIUM SCH MG: 40 INJECTION, POWDER, FOR SOLUTION INTRAVENOUS at 08:18

## 2022-11-10 RX ADMIN — SUCRALFATE SCH GM: 1 TABLET ORAL at 12:10

## 2022-11-10 RX ADMIN — INSULIN ASPART SCH UNIT: 100 INJECTION, SOLUTION INTRAVENOUS; SUBCUTANEOUS at 12:10

## 2022-11-10 RX ADMIN — FUROSEMIDE SCH MG: 10 INJECTION, SOLUTION INTRAMUSCULAR; INTRAVENOUS at 20:31

## 2022-11-10 RX ADMIN — HYDROCORTISONE SODIUM SUCCINATE SCH MG: 100 INJECTION, POWDER, FOR SOLUTION INTRAMUSCULAR; INTRAVENOUS at 16:17

## 2022-11-10 RX ADMIN — FORMOTEROL FUMARATE DIHYDRATE SCH MCG: 20 SOLUTION RESPIRATORY (INHALATION) at 07:18

## 2022-11-10 RX ADMIN — POTASSIUM CHLORIDE SCH MEQ: 20 TABLET, EXTENDED RELEASE ORAL at 03:00

## 2022-11-10 RX ADMIN — GABAPENTIN SCH MG: 400 CAPSULE ORAL at 20:32

## 2022-11-10 RX ADMIN — SUCRALFATE SCH GM: 1 TABLET ORAL at 20:32

## 2022-11-10 RX ADMIN — METOPROLOL TARTRATE SCH MG: 25 TABLET, FILM COATED ORAL at 08:19

## 2022-11-10 RX ADMIN — ONDANSETRON PRN MG: 2 INJECTION INTRAMUSCULAR; INTRAVENOUS at 14:48

## 2022-11-10 RX ADMIN — POTASSIUM CHLORIDE SCH MEQ: 20 TABLET, EXTENDED RELEASE ORAL at 00:13

## 2022-11-10 RX ADMIN — MORPHINE SULFATE PRN MG: 15 TABLET ORAL at 16:16

## 2022-11-10 RX ADMIN — IPRATROPIUM BROMIDE AND ALBUTEROL SULFATE SCH ML: .5; 3 SOLUTION RESPIRATORY (INHALATION) at 16:37

## 2022-11-10 RX ADMIN — INSULIN ASPART SCH UNIT: 100 INJECTION, SOLUTION INTRAVENOUS; SUBCUTANEOUS at 16:16

## 2022-11-10 RX ADMIN — INSULIN ASPART SCH: 100 INJECTION, SOLUTION INTRAVENOUS; SUBCUTANEOUS at 06:31

## 2022-11-10 RX ADMIN — METOPROLOL TARTRATE SCH MG: 25 TABLET, FILM COATED ORAL at 20:32

## 2022-11-10 NOTE — P.PN
Subjective


Progress Note Date: 11/10/22





CHIEF COMPLAINT: Acute GI bleeding





HISTORY OF PRESENT ILLNESS: Patient remains in the ICU.  Patient has had no 

further bloody bowel movements.  Patient's hemoglobin is down to 7.1 and 

platelets are down to 12.  He is receiving a unit of blood and a unit of 

platelets.  He tolerated advancement to full liquids.  Afebrile.  Mild 

tachycardia





Patient seen and examined with Dr. Mcbride





PHYSICAL EXAM: 


VITAL SIGNS: Reviewed.


GENERAL: Well-developed in no acute distress. 


HEENT:  No sclera icterus. Extraocular movements grossly intact.  Moist buccal 

mucosa. Head is atraumatic, normocephalic. 


ABDOMEN:  Soft.  Nondistended. Nontender. 


NEUROLOGIC: Alert and oriented. Cranial nerves II through XII grossly intact.





ASSESSMENT: 


1.  Acute GI bleed with bright red blood per rectum status post blood trans

fusion


2.  History of small cell lung cancer


3.  Pancytopenia 








PLAN: 


-Agree with blood transfusion and platelet transfusion


-Advance diet to regular


-Continue to monitor hemoglobin


-Continue monitor for any signs or symptoms of bleeding


-Continue PPI


-Continue transfer plans to [facility]


-No plans for endoscopy





Physician Assistant note has been reviewed by physician. Signing provider agrees

with the documented findings, assessment, and plan of care. 





Objective





- Vital Signs


Vital signs: 


                                   Vital Signs











Temp  97.5 F L  11/10/22 13:03


 


Pulse  105 H  11/10/22 13:03


 


Resp  18   11/10/22 13:03


 


BP  107/62   11/10/22 13:03


 


Pulse Ox  90 L  11/10/22 13:03


 


FiO2      








                                 Intake & Output











 11/09/22 11/10/22 11/10/22





 18:59 06:59 18:59


 


Intake Total 460 240 460


 


Output Total 2600 425 1125


 


Balance -1301 -202 -607


 


Weight  86.8 kg 86.8 kg


 


Intake:   


 


   240 150


 


    Sodium Chloride 0.9% 1, 410 240 100





    000 ml @ 20 mls/hr IV .   





    Q24H PETER Rx#:[id number]   


 


    cefTRIAXone 1 gm In 50  50





    Sodium Chloride 0.9% 50   





    ml @ 100 mls/hr IVPB   





    Q24HR PETER Rx#:[id number]   


 


  Blood Product   310


 


    Rc Irr As1  Unit   310





    [id number]   


 


Output:   


 


  Urine 2600 425 1125


 


Other:   


 


  Voiding Method External Catheter Urinal Urinal


 


  # Voids 1 0 0


 


  # Bowel Movements 1  














- Labs


CBC & Chem 7: 


                                 11/10/22 05:00





                                 11/10/22 05:00


Labs: 


                  Abnormal Lab Results - Last 24 Hours (Table)











  11/09/22 11/09/22 11/09/22 Range/Units





  15:11 15:11 17:51 


 


RBC   2.42 L   (4.30-5.90)  m/uL


 


Hgb   7.6 L   (13.0-17.5)  gm/dL


 


Hct   21.4 L   (39.0-53.0)  %


 


RDW   18.5 H   (11.5-15.5)  %


 


Plt Count   20 L   (150-450)  k/uL


 


Sodium     (137-145)  mmol/L


 


Potassium  2.9 L    (3.5-5.1)  mmol/L


 


Chloride     ()  mmol/L


 


Creatinine     (0.66-1.25)  mg/dL


 


Glucose     (74-99)  mg/dL


 


POC Glucose (mg/dL)    194 H  ()  mg/dL


 


Calcium     (8.4-10.2)  mg/dL


 


Crossmatch     














  11/09/22 11/09/22 11/10/22 Range/Units





  19:54 21:19 05:00 


 


RBC    2.38 L  (4.30-5.90)  m/uL


 


Hgb    7.1 L  (13.0-17.5)  gm/dL


 


Hct    21.1 L  (39.0-53.0)  %


 


RDW    19.2 H  (11.5-15.5)  %


 


Plt Count    12 L*  (150-450)  k/uL


 


Sodium     (137-145)  mmol/L


 


Potassium   3.0 L   (3.5-5.1)  mmol/L


 


Chloride     ()  mmol/L


 


Creatinine     (0.66-1.25)  mg/dL


 


Glucose     (74-99)  mg/dL


 


POC Glucose (mg/dL)  176 H    ()  mg/dL


 


Calcium     (8.4-10.2)  mg/dL


 


Crossmatch     














  11/10/22 11/10/22 11/10/22 Range/Units





  05:00 06:30 08:54 


 


RBC     (4.30-5.90)  m/uL


 


Hgb     (13.0-17.5)  gm/dL


 


Hct     (39.0-53.0)  %


 


RDW     (11.5-15.5)  %


 


Plt Count     (150-450)  k/uL


 


Sodium  136 L    (137-145)  mmol/L


 


Potassium     (3.5-5.1)  mmol/L


 


Chloride  109 H    ()  mmol/L


 


Creatinine  0.47 L    (0.66-1.25)  mg/dL


 


Glucose  144 H    (74-99)  mg/dL


 


POC Glucose (mg/dL)   143 H   ()  mg/dL


 


Calcium  7.3 L    (8.4-10.2)  mg/dL


 


Crossmatch    See Detail  














  11/10/22 Range/Units





  11:51 


 


RBC   (4.30-5.90)  m/uL


 


Hgb   (13.0-17.5)  gm/dL


 


Hct   (39.0-53.0)  %


 


RDW   (11.5-15.5)  %


 


Plt Count   (150-450)  k/uL


 


Sodium   (137-145)  mmol/L


 


Potassium   (3.5-5.1)  mmol/L


 


Chloride   ()  mmol/L


 


Creatinine   (0.66-1.25)  mg/dL


 


Glucose   (74-99)  mg/dL


 


POC Glucose (mg/dL)  164 H  ()  mg/dL


 


Calcium   (8.4-10.2)  mg/dL


 


Crossmatch   








                      Microbiology - Last 24 Hours (Table)











 11/05/22 17:30 Blood Culture - Preliminary





 Blood    No Growth after 96 hours


 


 11/08/22 08:40 Gram Stain - Preliminary





 Sputum Sputum Culture - Preliminary





    Gram Neg Bacilli

## 2022-11-10 NOTE — P.PN
Subjective


Progress Note Date: 11/10/22


Principal diagnosis: 





GI bleed.  SCLC, on treatment





In f/u today pt reports ongoing shortness of breath, is unable to participate in

any activity.  He has had bowel movements and denies any dark, tarry or overtly 

bloody stools.





Objective





- Vital Signs


Vital signs: 


                                   Vital Signs











Temp  97.6 F   11/10/22 16:00


 


Pulse  111 H  11/10/22 17:00


 


Resp  21   11/10/22 17:00


 


BP  139/77   11/10/22 17:00


 


Pulse Ox  88 L  11/10/22 17:00


 


FiO2      








                                 Intake & Output











 11/09/22 11/10/22 11/10/22





 18:59 06:59 18:59


 


Intake Total 460 240 870


 


Output Total 2600 425 1825


 


Balance -5770 185 -916


 


Weight  86.8 kg 86.8 kg


 


Intake:   


 


   240 250


 


    Sodium Chloride 0.9% 1, 410 240 150





    000 ml @ 20 mls/hr IV .   





    Q24H PETER Rx#:[id number]   


 


    cefTRIAXone 1 gm In 50  100





    Sodium Chloride 0.9% 50   





    ml @ 100 mls/hr IVPB   





    Q24HR PETER Rx#:[id number]   


 


  Blood Product   620


 


    Rc Irr As1  Unit   310





    [id number]   


 


Output:   


 


  Urine 2600 425 1825


 


Other:   


 


  Voiding Method External Catheter Urinal Urinal


 


  # Voids 1 0 1


 


  # Bowel Movements 1  














- Constitutional


General appearance: Present: cooperative, mild distress, obese





- EENT


Eyes: Present: anicteric sclerae, EOMI


ENT: Present: hearing grossly normal





- Respiratory


Respiratory: bilateral: diminished





- Cardiovascular


Rhythm: regular


Heart sounds: normal: S1, S2


Abnormal Heart Sounds: Absent: systolic murmur, diastolic murmur, rub, S3 

Gallop, S4 Gallop, click, other





- Peripheral edema


  ** leg


Peripheral Edema: bilateral: None





- Gastrointestinal


General gastrointestinal: Present: normal bowel sounds, soft





- Neurologic


Neurologic: Present: CNII-XII intact





- Musculoskeletal


Musculoskeletal: Present: generalized weakness





- Psychiatric


Psychiatric: Present: A&O x's 3, appropriate affect, intact judgment & insight





- Labs


CBC & Chem 7: 


                                 11/10/22 16:10





                                 11/10/22 05:00


Labs: 


                  Abnormal Lab Results - Last 24 Hours (Table)











  11/09/22 11/09/22 11/09/22 Range/Units





  17:51 19:54 21:19 


 


WBC     (3.8-10.6)  k/uL


 


RBC     (4.30-5.90)  m/uL


 


Hgb     (13.0-17.5)  gm/dL


 


Hct     (39.0-53.0)  %


 


RDW     (11.5-15.5)  %


 


Plt Count     (150-450)  k/uL


 


Sodium     (137-145)  mmol/L


 


Potassium    3.0 L  (3.5-5.1)  mmol/L


 


Chloride     ()  mmol/L


 


Creatinine     (0.66-1.25)  mg/dL


 


Glucose     (74-99)  mg/dL


 


POC Glucose (mg/dL)  194 H  176 H   ()  mg/dL


 


Calcium     (8.4-10.2)  mg/dL


 


Crossmatch     














  11/10/22 11/10/22 11/10/22 Range/Units





  05:00 05:00 06:30 


 


WBC     (3.8-10.6)  k/uL


 


RBC  2.38 L    (4.30-5.90)  m/uL


 


Hgb  7.1 L    (13.0-17.5)  gm/dL


 


Hct  21.1 L    (39.0-53.0)  %


 


RDW  19.2 H    (11.5-15.5)  %


 


Plt Count  12 L*    (150-450)  k/uL


 


Sodium   136 L   (137-145)  mmol/L


 


Potassium     (3.5-5.1)  mmol/L


 


Chloride   109 H   ()  mmol/L


 


Creatinine   0.47 L   (0.66-1.25)  mg/dL


 


Glucose   144 H   (74-99)  mg/dL


 


POC Glucose (mg/dL)    143 H  ()  mg/dL


 


Calcium   7.3 L   (8.4-10.2)  mg/dL


 


Crossmatch     














  11/10/22 11/10/22 11/10/22 Range/Units





  08:54 11:51 16:04 


 


WBC     (3.8-10.6)  k/uL


 


RBC     (4.30-5.90)  m/uL


 


Hgb     (13.0-17.5)  gm/dL


 


Hct     (39.0-53.0)  %


 


RDW     (11.5-15.5)  %


 


Plt Count     (150-450)  k/uL


 


Sodium     (137-145)  mmol/L


 


Potassium     (3.5-5.1)  mmol/L


 


Chloride     ()  mmol/L


 


Creatinine     (0.66-1.25)  mg/dL


 


Glucose     (74-99)  mg/dL


 


POC Glucose (mg/dL)   164 H  176 H  ()  mg/dL


 


Calcium     (8.4-10.2)  mg/dL


 


Crossmatch  See Detail    














  11/10/22 Range/Units





  16:10 


 


WBC  11.2 H  (3.8-10.6)  k/uL


 


RBC  2.91 L  (4.30-5.90)  m/uL


 


Hgb  8.9 L D  (13.0-17.5)  gm/dL


 


Hct  25.3 L  (39.0-53.0)  %


 


RDW  18.3 H  (11.5-15.5)  %


 


Plt Count  10 L*  (150-450)  k/uL


 


Sodium   (137-145)  mmol/L


 


Potassium   (3.5-5.1)  mmol/L


 


Chloride   ()  mmol/L


 


Creatinine   (0.66-1.25)  mg/dL


 


Glucose   (74-99)  mg/dL


 


POC Glucose (mg/dL)   ()  mg/dL


 


Calcium   (8.4-10.2)  mg/dL


 


Crossmatch   








                      Microbiology - Last 24 Hours (Table)











 11/05/22 17:30 Blood Culture - Preliminary





 Blood    No Growth after 96 hours


 


 11/08/22 08:40 Gram Stain - Preliminary





 Sputum Sputum Culture - Preliminary





    Gram Neg Bacilli














Assessment and Plan


(1) GI bleed


Current Visit: Yes   Status: Acute   Priority: High   Code(s): K92.2 - 

GASTROINTESTINAL HEMORRHAGE, UNSPECIFIED   SNOMED Code(s): 04792787


   





(2) Pancytopenia due to antineoplastic chemotherapy


Current Visit: Yes   Status: Acute   Priority: High   Code(s): D61.810 - 

ANTINEOPLASTIC CHEMOTHERAPY INDUCED PANCYTOPENIA; T45.1X5A - ADVERSE EFFECT OF 

ANTINEOPLASTIC AND IMMUNOSUP DRUGS, INIT   SNOMED Code(s): 034148445340564


   





(3) Primary small cell malignant neoplasm of lung, stage 4


Current Visit: Yes   Status: Acute   Priority: High   Code(s): C34.90 - 

MALIGNANT NEOPLASM OF UNSP PART OF UNSP BRONCHUS OR LUNG   SNOMED Code(s): 

13227841071423


   


Plan: 





Pancytopenia secondary to recent treatment with zepzelca.  Patient did receive 

G-CSF x 6.  WBC 9.4.  Platelets 12,000 today.  1 unit SDP ordered.  Hemoglobin 

7.1 today.  1 unit packed red blood cells ordered.


DIC work up Yesterday was negative.  No evidence of ongoing acute bleeding 

today.  CBC will be monitored daily now.


Tagged RBC scan was non diagnostic for a source of acute bleeding.  


Patient has been seen by Surgery. 


Shortness of breath.  Stable, no better, no worse.  Will see if any improvement 

after PRBCs.  


Patient just started zepzelca, CTA reports some improvement in tumor size when 

compared to imaging 10/1.  Would like to be able to continue patient on the 

same.  There are dose reductions so, continuation of therapy after pt has 

adequately recovered (clinically and hematologically) may be a possibility.  G-

CSF can be added as well if felt necessary.  Cont to follow hospital course.


Continue pain medications as prescribed from the office. Pain seems to be 

manageable at this time.

## 2022-11-10 NOTE — P.PN
Subjective


Progress Note Date: 11/10/22


Principal diagnosis: 





Acute GI bleeding





This is a 62-year-old white male with history of small cell lung cancer, 

presented initially back in 2021 with a large pleural effusion requiring 

thoracentesis and the diagnosis was made.  Patient received chemotherapy by 

oncology, and has been receiving chemotherapy maintenance.  Patient was admitted

on 11//22, mostly with shortness of breath, and multiple episodes of bloody 

stools.  Patient required so far multiple blood transfusions including 5 units 

of packed RBCs and 4 units of platelets, patient was noted to be pancytopenic, 

admitted because of the GI bleeding, and we were asked to see him on 

consultation today as the patient had worsening episodes of bleeding and 

required transfer to the ICU.  He was seen by general surgery, the plan is to 

consider tagged RBC study, no plans to have any intervention.  No GI coverage 

available, patient is being considered for transfer to [facility] once 

a bed becomes available.  Considering the patient was admitted to the ICU, I was

asked to see him on consultation.  Pulmonary-wise, the patient does not seem to 

be in any distress, he seems to be very comfortable.  His hemoglobin this 

morning is 9.0, WBC count is 2.1, platelets are 42,000.  Patient is being 

followed by hematology/oncology.  CT angiogram of the chest showed no evidence 

of pulmonary embolism, patient had bilateral reticular pulmonary interstitial 

infiltrates, and he had left upper lobe spiculated abnormality consistent with 

bronchogenic carcinoma, however seems to have a partial response to treatment





Reevaluated today on 11/8/22, patient remains in the ICU, continues to have 

intermittent episodes of lower GI bleeding, bright red blood per rectum.  Still 

waiting for transfer to [facility], patient was seen by general 

surgery, no immediate intervention is planned, however he may be undergoing 

tagged RBC study.  His hemoglobin this morning is 9.1, electrolytes are normal 

renal profile is normal WBC count is 5.9.  Patient received a total of 5 units 

of packed RBCs since admission and 5 units of platelets.  Platelets today are 

54,000.  Patient is comfortable, not in distress.





Reevaluated today on 11/9/22, remains in the ICU, hemodynamically stable, 

nonetheless the patient continues to demonstrate slight bleeding but no active 

bleeding clinically.  Hemoglobin dropped today to 7.5 compared to yesterday.  

Hemoglobin was 9.1 yesterday, stools are black and brown.  Hopefully the patient

is not actively bleeding.  But that's not definitely certain at this point.  

Patient seems to be a bit more short of breath today compared to yesterday, 

chest x-ray is showing interstitial edema, and I'm recommending a dose of Lasix 

to be given.  Patient does have possibly some underlying interstitial lung 

disease











Reevaluated today on 11/10/20, a shunt remains in the ICU.  Hemoglobin continues

to drop down slowly.  Clinically no active bleeding is noted.  Hemoglobin today 

is 7.1 platelets are 12,000.  Patient received so far 5 units of packed RBCs and

5 units of platelets and he is to receive another unit of packed RBCs today and 

1 unit of platelets today.  Sputum is positive for gram-negative bacilli, 

patient is on Rocephin.  Chest x-ray is showing interstitial edema and the 

patient is on Lasix today 40 mg IV push every 12 hours.  Continues to have 

shortness of breath, he is on 8 L high flow cannula, and on physical examination

he has crackles and rhonchi bilaterally.  Maintenance Lasix was ordered today.  

Renal profile is normal.  Patient has intermittent cough and wheezing shortness 

of breath noted.





Objective





- Vital Signs


Vital signs: 


                                   Vital Signs











Temp  97.4 F L  11/10/22 11:08


 


Pulse  98   11/10/22 11:08


 


Resp  18   11/10/22 11:08


 


BP  115/65   11/10/22 11:08


 


Pulse Ox  91 L  11/10/22 11:08


 


FiO2      








                                 Intake & Output











 11/09/22 11/10/22 11/10/22





 18:59 06:59 18:59


 


Intake Total 460 240 130


 


Output Total 2600 425 


 


Balance -2140 -185 130


 


Weight  86.8 kg 


 


Intake:   


 


   240 130


 


    Sodium Chloride 0.9% 1, 410 240 80





    000 ml @ 20 mls/hr IV .   





    Q24H PETER Rx#:[id number]   


 


    cefTRIAXone 1 gm In 50  50





    Sodium Chloride 0.9% 50   





    ml @ 100 mls/hr IVPB   





    Q24HR PETER Rx#:[id number]   


 


  Blood Product   0


 


    Rc Irr As1  Unit   0





    [id number]   


 


Output:   


 


  Urine 2600 425 


 


Other:   


 


  Voiding Method External Catheter Urinal Urinal


 


  # Voids 1 0 0


 


  # Bowel Movements 1  














- Exam


General appearance: Revealed a 62-year-old white male in no distress.  On 8 L 

high flow cannula.


HET: Head is normocephalic and atraumatic.  Conjunctiva pink.  Sclera anicteric.


Neck: Supple without lymphadenopathy.  Trachea midline.


Heart: S1 S2.  Regular rate and rhythm.


Lungs: Scattered rhonchi and wheezing noted bilaterally.


Abdomen: Soft, nontender, nondistended with  bowel sounds.  No guarding or 

rigidity.


Skin:  No rashes. No jaundice.


Extremities: Normal skin color and turgor.  2+ b bipedal edema


Neurological: No focal deficits.  Alert and oriented x3.


Psychiatric: Normal mood, affect and normal mental status examination.


Musculoskeletal: No deformities and no limitation in range of motion














- Labs


CBC & Chem 7: 


                                 11/10/22 05:00





                                 11/10/22 05:00


Labs: 


                  Abnormal Lab Results - Last 24 Hours (Table)











  11/09/22 11/09/22 11/09/22 Range/Units





  06:28 11:43 15:11 


 


RBC     (4.30-5.90)  m/uL


 


Hgb     (13.0-17.5)  gm/dL


 


Hct     (39.0-53.0)  %


 


RDW     (11.5-15.5)  %


 


Plt Count  24 L D    (150-450)  k/uL


 


Lymphocytes #  0.4 L    (1.0-4.8)  k/uL


 


Sodium     (137-145)  mmol/L


 


Potassium    2.9 L  (3.5-5.1)  mmol/L


 


Chloride     ()  mmol/L


 


Creatinine     (0.66-1.25)  mg/dL


 


Glucose     (74-99)  mg/dL


 


POC Glucose (mg/dL)   154 H   ()  mg/dL


 


Calcium     (8.4-10.2)  mg/dL


 


Crossmatch     














  11/09/22 11/09/22 11/09/22 Range/Units





  15:11 17:51 19:54 


 


RBC  2.42 L    (4.30-5.90)  m/uL


 


Hgb  7.6 L    (13.0-17.5)  gm/dL


 


Hct  21.4 L    (39.0-53.0)  %


 


RDW  18.5 H    (11.5-15.5)  %


 


Plt Count  20 L    (150-450)  k/uL


 


Lymphocytes #     (1.0-4.8)  k/uL


 


Sodium     (137-145)  mmol/L


 


Potassium     (3.5-5.1)  mmol/L


 


Chloride     ()  mmol/L


 


Creatinine     (0.66-1.25)  mg/dL


 


Glucose     (74-99)  mg/dL


 


POC Glucose (mg/dL)   194 H  176 H  ()  mg/dL


 


Calcium     (8.4-10.2)  mg/dL


 


Crossmatch     














  11/09/22 11/10/22 11/10/22 Range/Units





  21:19 05:00 05:00 


 


RBC   2.38 L   (4.30-5.90)  m/uL


 


Hgb   7.1 L   (13.0-17.5)  gm/dL


 


Hct   21.1 L   (39.0-53.0)  %


 


RDW   19.2 H   (11.5-15.5)  %


 


Plt Count   12 L*   (150-450)  k/uL


 


Lymphocytes #     (1.0-4.8)  k/uL


 


Sodium    136 L  (137-145)  mmol/L


 


Potassium  3.0 L    (3.5-5.1)  mmol/L


 


Chloride    109 H  ()  mmol/L


 


Creatinine    0.47 L  (0.66-1.25)  mg/dL


 


Glucose    144 H  (74-99)  mg/dL


 


POC Glucose (mg/dL)     ()  mg/dL


 


Calcium    7.3 L  (8.4-10.2)  mg/dL


 


Crossmatch     














  11/10/22 11/10/22 Range/Units





  06:30 08:54 


 


RBC    (4.30-5.90)  m/uL


 


Hgb    (13.0-17.5)  gm/dL


 


Hct    (39.0-53.0)  %


 


RDW    (11.5-15.5)  %


 


Plt Count    (150-450)  k/uL


 


Lymphocytes #    (1.0-4.8)  k/uL


 


Sodium    (137-145)  mmol/L


 


Potassium    (3.5-5.1)  mmol/L


 


Chloride    ()  mmol/L


 


Creatinine    (0.66-1.25)  mg/dL


 


Glucose    (74-99)  mg/dL


 


POC Glucose (mg/dL)  143 H   ()  mg/dL


 


Calcium    (8.4-10.2)  mg/dL


 


Crossmatch   See Detail  








                      Microbiology - Last 24 Hours (Table)











 11/05/22 17:30 Blood Culture - Preliminary





 Blood    No Growth after 96 hours


 


 11/08/22 08:40 Gram Stain - Preliminary





 Sputum Sputum Culture - Preliminary





    Gram Neg Bacilli














Assessment and Plan


Assessment: 





Impression:


Acute GI bleeding, persistent, exact source is yet to be determined tagged RBC 

study was nondiagnostic


Pancytopenia secondary to chemotherapy


Small cell lung cancer/metastatic.


History of underlying COPD/emphysema


History of degenerative joint disease and multiple orthopedic surgeries.


History of GERD.





Recommendation:


Continue to monitor in the ICU


Michelle's platelets and packed RBCs today.


Lasix 40 mg IV push twice a day.


Continue transfer plans to [facility] 


Continue bronchodilators


Prognosis remains extremely poor and guarded


We will continue to follow


Time with Patient: Less than 30

## 2022-11-10 NOTE — P.PN
Subjective


Progress Note Date: 11/10/22


H&P Date: 11/03/22


Chief Complaint: Rectal bleeding


This is a pleasant 62-year-old gentleman with past medical history of metastatic

lung disease/ primary small cell malignant neoplasm of lung, stage IV presented 

to the ER with Sunil rectal bleeding X 3, accompanied by lower abdominal 

cramping and exertional shortness of breath.  Reports some nausea, denies 

emesis.  Also reports extensive workup of abdominal pain and per oncology 

attributing it to chemo effect. Last chemotherapy approximately 1 week ago. 

Chest CTA reported : no evidence of pulmonary embolism.  Mediastinal left 

bronchial adenopathy with improvement in the left hilar mass compared to old 

exam.  Slightly decreased left upper lobe spiculated infiltrate extending from 

the left pulmonary hilum to the left lung apex,compared to old exam.  Improved 

encasement of the left lower lobe pulmonary artery with tumor mass and artery 

narrowing compared to old exam.COPD. Increased patchy bilateral reticular 

pulmonary interstitial infiltrates compared to prior exam.  Increased small left

pleural effusion compared to prior exam.  Pericardial tumor masses not 

significantly different than last exam.  On admission :WBC 0.2 hemoglobin 6.5 

hematocrit 18.9 platelet count 5 INR 1.0 d-dimer 1.76 sodium 136 potassium 4.4 

BUN 12 creatinine 0.63 glucose 115, bilirubin 0.4 AST 23 ALT 22 alk phos 60. 

Repeat labs today WBC 0.1 hemoglobin 7.5 hematocrit 22 platelet count 7. 

Received 1 unit of packed red blood cell transfusion as well as 1 unit of 

platelets.  Denies any further rectal bleeding since admission.  Denies 

shortness of breath at rest.  Denies chest pain, palpitations.








11/04/2022 maintained on IV fluid hydration, Zarxio,PPI, carafate .reports dark 

maroon stooling 2 last night.  Denies any further bleeding this morning.  

Denies abdominal cramping, abdominal pain.  Consuming 25-75% with no nausea 

vomiting. Received a total of 3 units packed RBCs, 2 units of platelets.  WBC 

increased to 0.5, hemoglobin 7.9, platelets 28 .Afebrile.  Denies chest pain, 

palpitations or shortness of breath.  Complains of exertional shortness of 

breath.





Evaluated by both GI and oncology with recommendations noted and appreciated.  

Neutropenic precautions. Maintained on zaxrio,PPI, Carafate,empiric Augmentin 

and doxycycline. Transfusion parameters as per oncology. 





11/07/2022 Over the weekend, patient had reoccurrence of rectal bleeding 

accompanied by acute drop in hemoglobin to 6 and platelets to 8 requiring 

transfer into the ICU.Maintained on supportive transfusions to maintain 

hemoglobin greater than 7, platelets greater than 50.  Post transfusion, 

hemoglobin currently up to 9, platelets 42.  No bowel movement since yesterday 

.Denies abdominal pain .  Denies chest pain, palpitations or shortness of 

breath.  Chronic back pain controlled.  Patient in need of GI 

evaluation.Transfer to [facility],in progress related to GI services not 

available at this site, this week.  Prognosis guarded given multiple complex 

medical issues. 





11/08/2022 transfer to tertiary care center pending.  Patient continued to have 

maroon bowel movements yesterday, last night and throughout the midnight shift. 

Repeat blood counts of yesterday reported hemoglobin dropping to 8.5, platelets 

down to 60 with a.m. labs pending.  Patient reports chills, increased shortness 

of breath at rest. Maintaining O2 sats in the 90s on 4 L nasal cannula. Minimal 

cough, including dark brownish sputum-cultures sent; possible microscopic 

bleeding. Afebrile, WBC of yesterday 4.2.  Supportive transfusions.  Denies 

chest pain, palpitations. ICU management as per intensivist. Awaiting bed for 

transfer.








11/09/2022 hemoglobin decreased to 7.5, platelets 24, neutrophils 85.  No 

further stooling this morning.  Staff reports patient had 4 bowel movements with

minimal rectal bleeding yesterday that initially subsided-last bowel movement 

reported brown in color.  Denies abdominal pain.Increased shortness of breath at

rest with oxygen requirements worsened, requiring 8 L high flow nasal cannula to

maintain O2 sats in the low 90s.  Mild tachycardia this morning with heart rates

in the 1 teens.  Afebrile, normal WBC.  Receiving potassium supplements for 

potassium 3.2.  Renal function stable.  Blood sugars controlled.  Chest x-ray 

reporting bilateral pleural thickening/interstitial edema, suspect chronic idi

opathic pulmonary fibrosis. IV fluids pivl'd, received a dose of Lasix IV push. 

Tagged RBC reported no evidence of active GI bleeding during the initial 1 hour 

of observation.








11/10/2022 Tagged RBC nondiagnostic.  Continues on 8 L high flow nasal cannula 

maintaining O2 sats in the high 80s to low 90s.  Feels about the same -Shortness

of breath unchanged. Mild tachycardia.  2 bowel movements yesterday reported 

normal/brown.  Denies nausea/vomiting.  Denies abdominal pain.  Requesting diet 

advancement from clear liquids.  Blood sugars controlled.  Chronic back pain 

controlled.  Hemoglobin decreased to 7.1, platelets decreased to 12.  Potassium 

supplemented yesterday, currently 4.1. Renal function stable.  Denies chest 

pain, palpitations.  Afebrile, WBC 9.4.





Objective





- Vital Signs


Vital signs: 


                                   Vital Signs











Temp  96.7 F L  11/10/22 08:00


 


Pulse  95   11/10/22 10:00


 


Resp  19   11/10/22 10:00


 


BP  123/69   11/10/22 10:00


 


Pulse Ox  92 L  11/10/22 10:00


 


FiO2      








                                 Intake & Output











 11/09/22 11/10/22 11/10/22





 18:59 06:59 18:59


 


Intake Total 460 240 130


 


Output Total 2600 425 


 


Balance -2140 -185 130


 


Weight  86.8 kg 


 


Intake:   


 


   240 130


 


    Sodium Chloride 0.9% 1, 410 240 80





    000 ml @ 20 mls/hr IV .   





    Q24H [facility] Rx#:[id number]   


 


    cefTRIAXone 1 gm In 50  50





    Sodium Chloride 0.9% 50   





    ml @ 100 mls/hr IVPB   





    Q24HR [facility] Rx#:[id number]   


 


Output:   


 


  Urine 2600 425 


 


Other:   


 


  Voiding Method External Catheter Urinal Urinal


 


  # Voids 1 0 0


 


  # Bowel Movements 1  














- Exam





- Exam


PHYSICAL EXAM:


VITAL SIGNS: [As above]


GENERAL: Alert and oriented 3, Sitting up in bed,no acute distress


HEENT:  Conjunctivae normal. eyes normal.


NECK: Supple, No JVD.


CARDIOVASCULAR:  S1, S2 regular, mild tachycardia. No murmur.


RESPIRATION: Respiratory effort mildly increased, Breath sounds diminished in 

the bases.  Positive rhonchi, no crackles or wheezing. 


ABDOMEN:  Soft, nontender, No guarding.Bowel sounds heard.


LEGS: Mild edema


NERVOUS SYSTEM:  Cranial N 2-12 grossly normal.No focal deficits. Strength and 

sensation grossly intact.


Skin: Warm and dry, no rash 











- Labs


CBC & Chem 7: 


                                 11/10/22 05:00





                                 11/10/22 05:00


Labs: 


                  Abnormal Lab Results - Last 24 Hours (Table)











  11/09/22 11/09/22 11/09/22 Range/Units





  06:28 11:43 15:11 


 


RBC     (4.30-5.90)  m/uL


 


Hgb     (13.0-17.5)  gm/dL


 


Hct     (39.0-53.0)  %


 


RDW     (11.5-15.5)  %


 


Plt Count  24 L D    (150-450)  k/uL


 


Lymphocytes #  0.4 L    (1.0-4.8)  k/uL


 


Sodium     (137-145)  mmol/L


 


Potassium    2.9 L  (3.5-5.1)  mmol/L


 


Chloride     ()  mmol/L


 


Creatinine     (0.66-1.25)  mg/dL


 


Glucose     (74-99)  mg/dL


 


POC Glucose (mg/dL)   154 H   ()  mg/dL


 


Calcium     (8.4-10.2)  mg/dL


 


Crossmatch     














  11/09/22 11/09/22 11/09/22 Range/Units





  15:11 17:51 19:54 


 


RBC  2.42 L    (4.30-5.90)  m/uL


 


Hgb  7.6 L    (13.0-17.5)  gm/dL


 


Hct  21.4 L    (39.0-53.0)  %


 


RDW  18.5 H    (11.5-15.5)  %


 


Plt Count  20 L    (150-450)  k/uL


 


Lymphocytes #     (1.0-4.8)  k/uL


 


Sodium     (137-145)  mmol/L


 


Potassium     (3.5-5.1)  mmol/L


 


Chloride     ()  mmol/L


 


Creatinine     (0.66-1.25)  mg/dL


 


Glucose     (74-99)  mg/dL


 


POC Glucose (mg/dL)   194 H  176 H  ()  mg/dL


 


Calcium     (8.4-10.2)  mg/dL


 


Crossmatch     














  11/09/22 11/10/22 11/10/22 Range/Units





  21:19 05:00 05:00 


 


RBC   2.38 L   (4.30-5.90)  m/uL


 


Hgb   7.1 L   (13.0-17.5)  gm/dL


 


Hct   21.1 L   (39.0-53.0)  %


 


RDW   19.2 H   (11.5-15.5)  %


 


Plt Count   12 L*   (150-450)  k/uL


 


Lymphocytes #     (1.0-4.8)  k/uL


 


Sodium    136 L  (137-145)  mmol/L


 


Potassium  3.0 L    (3.5-5.1)  mmol/L


 


Chloride    109 H  ()  mmol/L


 


Creatinine    0.47 L  (0.66-1.25)  mg/dL


 


Glucose    144 H  (74-99)  mg/dL


 


POC Glucose (mg/dL)     ()  mg/dL


 


Calcium    7.3 L  (8.4-10.2)  mg/dL


 


Crossmatch     














  11/10/22 11/10/22 Range/Units





  06:30 08:54 


 


RBC    (4.30-5.90)  m/uL


 


Hgb    (13.0-17.5)  gm/dL


 


Hct    (39.0-53.0)  %


 


RDW    (11.5-15.5)  %


 


Plt Count    (150-450)  k/uL


 


Lymphocytes #    (1.0-4.8)  k/uL


 


Sodium    (137-145)  mmol/L


 


Potassium    (3.5-5.1)  mmol/L


 


Chloride    ()  mmol/L


 


Creatinine    (0.66-1.25)  mg/dL


 


Glucose    (74-99)  mg/dL


 


POC Glucose (mg/dL)  143 H   ()  mg/dL


 


Calcium    (8.4-10.2)  mg/dL


 


Crossmatch   See Detail  








                      Microbiology - Last 24 Hours (Table)











 11/05/22 17:30 Blood Culture - Preliminary





 Blood    No Growth after 96 hours


 


 11/08/22 08:40 Gram Stain - Preliminary





 Sputum Sputum Culture - Preliminary





    Gram Neg Bacilli














Assessment and Plan


Assessment: 


Acute GI bleed, status post transfusion of packed RBCs and platelets.  No 

endoscopy recommended per GI.  Nondiagnostic RBC scan.


Pancytopenia secondary to recent treatment,Last received chemotherapy 1 week ago


Chronic abdominal, back pain due to metastatic lung disease in a patient with 

history of primary small cell malignant neoplasm of lung, stage IV.  Fentanyl 

patch dose increased last week with improvement in pain.


History of Adrenal mass likely secondary to metastatic lesion.


COPD


GERD


Anxiety/depression


Previous history of smoking


Adrenal insufficiency currently on Cortef at home.

















Plan: Continue on current medication regime ,monitoring and symptomatic 

treatment.  Transfuse 1 unit packed RBCs and platelets.  Close monitoring of 

coags. Continue with transfer process -accepted, awaiting bed at [facility].ICU 

management as per intensivist .Prognosis guarded given multiple complex medical 

issues.














The impression and plan of care has been dictated as directed.





:


I performed a history and examination of this patient,  discussed the same with 

the dictator.  I agree with the dictator's note ,documented as a scribe.  Any 

additional findings or plans will be noted.

## 2022-11-11 VITALS — DIASTOLIC BLOOD PRESSURE: 67 MMHG | HEART RATE: 110 BPM | SYSTOLIC BLOOD PRESSURE: 132 MMHG | RESPIRATION RATE: 19 BRPM

## 2022-11-11 VITALS — TEMPERATURE: 97.7 F

## 2022-11-11 LAB
ALBUMIN SERPL-MCNC: 2.5 G/DL (ref 3.5–5)
ALP SERPL-CCNC: 138 U/L (ref 38–126)
ALT SERPL-CCNC: 48 U/L (ref 4–49)
ANION GAP SERPL CALC-SCNC: -1 MMOL/L
AST SERPL-CCNC: 34 U/L (ref 17–59)
BASOPHILS # BLD AUTO: 0 K/UL (ref 0–0.2)
BASOPHILS NFR BLD AUTO: 0 %
BUN SERPL-SCNC: 12 MG/DL (ref 9–20)
CALCIUM SPEC-MCNC: 7.4 MG/DL (ref 8.4–10.2)
CHLORIDE SERPL-SCNC: 103 MMOL/L (ref 98–107)
CO2 SERPL-SCNC: 34 MMOL/L (ref 22–30)
EOSINOPHIL # BLD AUTO: 0 K/UL (ref 0–0.7)
EOSINOPHIL NFR BLD AUTO: 0 %
ERYTHROCYTE [DISTWIDTH] IN BLOOD BY AUTOMATED COUNT: 2.9 M/UL (ref 4.3–5.9)
ERYTHROCYTE [DISTWIDTH] IN BLOOD: 18.5 % (ref 11.5–15.5)
GLUCOSE BLD-MCNC: 158 MG/DL (ref 70–110)
GLUCOSE BLD-MCNC: 181 MG/DL (ref 70–110)
GLUCOSE BLD-MCNC: 271 MG/DL (ref 70–110)
GLUCOSE SERPL-MCNC: 156 MG/DL (ref 74–99)
HCT VFR BLD AUTO: 25.3 % (ref 39–53)
HGB BLD-MCNC: 8.8 GM/DL (ref 13–17.5)
LYMPHOCYTES # SPEC AUTO: 0.4 K/UL (ref 1–4.8)
LYMPHOCYTES NFR SPEC AUTO: 4 %
MCH RBC QN AUTO: 30.5 PG (ref 25–35)
MCHC RBC AUTO-ENTMCNC: 34.9 G/DL (ref 31–37)
MCV RBC AUTO: 87.2 FL (ref 80–100)
MONOCYTES # BLD AUTO: 0.5 K/UL (ref 0–1)
MONOCYTES NFR BLD AUTO: 5 %
NEUTROPHILS # BLD AUTO: 8.1 K/UL (ref 1.3–7.7)
NEUTROPHILS NFR BLD AUTO: 88 %
PLATELET # BLD AUTO: 27 K/UL (ref 150–450)
POTASSIUM SERPL-SCNC: 2.9 MMOL/L (ref 3.5–5.1)
PROT SERPL-MCNC: 4.9 G/DL (ref 6.3–8.2)
SODIUM SERPL-SCNC: 136 MMOL/L (ref 137–145)
WBC # BLD AUTO: 9.3 K/UL (ref 3.8–10.6)

## 2022-11-11 RX ADMIN — IPRATROPIUM BROMIDE AND ALBUTEROL SULFATE SCH ML: .5; 3 SOLUTION RESPIRATORY (INHALATION) at 07:47

## 2022-11-11 RX ADMIN — FUROSEMIDE SCH MG: 10 INJECTION, SOLUTION INTRAMUSCULAR; INTRAVENOUS at 09:41

## 2022-11-11 RX ADMIN — HYDROCORTISONE SODIUM SUCCINATE SCH MG: 100 INJECTION, POWDER, FOR SOLUTION INTRAMUSCULAR; INTRAVENOUS at 00:44

## 2022-11-11 RX ADMIN — BUDESONIDE SCH MG: 1 SUSPENSION RESPIRATORY (INHALATION) at 07:47

## 2022-11-11 RX ADMIN — MORPHINE SULFATE PRN MG: 15 TABLET ORAL at 09:40

## 2022-11-11 RX ADMIN — SUCRALFATE SCH GM: 1 TABLET ORAL at 16:53

## 2022-11-11 RX ADMIN — HYDROCORTISONE SODIUM SUCCINATE SCH MG: 100 INJECTION, POWDER, FOR SOLUTION INTRAMUSCULAR; INTRAVENOUS at 09:41

## 2022-11-11 RX ADMIN — HYDROCORTISONE SODIUM SUCCINATE SCH MG: 100 INJECTION, POWDER, FOR SOLUTION INTRAMUSCULAR; INTRAVENOUS at 16:53

## 2022-11-11 RX ADMIN — PANTOPRAZOLE SODIUM SCH MG: 40 INJECTION, POWDER, FOR SOLUTION INTRAVENOUS at 09:42

## 2022-11-11 RX ADMIN — FORMOTEROL FUMARATE DIHYDRATE SCH MCG: 20 SOLUTION RESPIRATORY (INHALATION) at 07:47

## 2022-11-11 RX ADMIN — METOPROLOL TARTRATE SCH MG: 25 TABLET, FILM COATED ORAL at 09:41

## 2022-11-11 RX ADMIN — SUCRALFATE SCH GM: 1 TABLET ORAL at 11:40

## 2022-11-11 RX ADMIN — INSULIN ASPART SCH UNIT: 100 INJECTION, SOLUTION INTRAVENOUS; SUBCUTANEOUS at 16:53

## 2022-11-11 RX ADMIN — INSULIN ASPART SCH UNIT: 100 INJECTION, SOLUTION INTRAVENOUS; SUBCUTANEOUS at 06:42

## 2022-11-11 RX ADMIN — GABAPENTIN SCH MG: 400 CAPSULE ORAL at 09:41

## 2022-11-11 RX ADMIN — IPRATROPIUM BROMIDE AND ALBUTEROL SULFATE SCH ML: .5; 3 SOLUTION RESPIRATORY (INHALATION) at 10:55

## 2022-11-11 RX ADMIN — POTASSIUM BICARBONATE SCH MEQ: 782 TABLET, EFFERVESCENT ORAL at 11:40

## 2022-11-11 RX ADMIN — ONDANSETRON PRN MG: 2 INJECTION INTRAMUSCULAR; INTRAVENOUS at 04:37

## 2022-11-11 RX ADMIN — POTASSIUM BICARBONATE SCH MEQ: 782 TABLET, EFFERVESCENT ORAL at 09:41

## 2022-11-11 RX ADMIN — SUCRALFATE SCH GM: 1 TABLET ORAL at 06:42

## 2022-11-11 RX ADMIN — IPRATROPIUM BROMIDE AND ALBUTEROL SULFATE SCH: .5; 3 SOLUTION RESPIRATORY (INHALATION) at 15:17

## 2022-11-11 RX ADMIN — INSULIN ASPART SCH UNIT: 100 INJECTION, SOLUTION INTRAVENOUS; SUBCUTANEOUS at 11:40

## 2022-11-11 RX ADMIN — POTASSIUM BICARBONATE SCH MEQ: 782 TABLET, EFFERVESCENT ORAL at 10:37

## 2022-11-11 NOTE — P.PN
Subjective


Progress Note Date: 11/11/22


Principal diagnosis: 





Acute GI bleeding





This is a 62-year-old white male with history of small cell lung cancer, 

presented initially back in 2021 with a large pleural effusion requiring 

thoracentesis and the diagnosis was made.  Patient received chemotherapy by 

oncology, and has been receiving chemotherapy maintenance.  Patient was admitted

on 11//22, mostly with shortness of breath, and multiple episodes of bloody 

stools.  Patient required so far multiple blood transfusions including 5 units 

of packed RBCs and 4 units of platelets, patient was noted to be pancytopenic, 

admitted because of the GI bleeding, and we were asked to see him on 

consultation today as the patient had worsening episodes of bleeding and 

required transfer to the ICU.  He was seen by general surgery, the plan is to 

consider tagged RBC study, no plans to have any intervention.  No GI coverage 

available, patient is being considered for transfer to [facility] once 

a bed becomes available.  Considering the patient was admitted to the ICU, I was

asked to see him on consultation.  Pulmonary-wise, the patient does not seem to 

be in any distress, he seems to be very comfortable.  His hemoglobin this 

morning is 9.0, WBC count is 2.1, platelets are 42,000.  Patient is being 

followed by hematology/oncology.  CT angiogram of the chest showed no evidence 

of pulmonary embolism, patient had bilateral reticular pulmonary interstitial 

infiltrates, and he had left upper lobe spiculated abnormality consistent with 

bronchogenic carcinoma, however seems to have a partial response to treatment





Reevaluated today on 11/8/22, patient remains in the ICU, continues to have 

intermittent episodes of lower GI bleeding, bright red blood per rectum.  Still 

waiting for transfer to [facility], patient was seen by general 

surgery, no immediate intervention is planned, however he may be undergoing 

tagged RBC study.  His hemoglobin this morning is 9.1, electrolytes are normal 

renal profile is normal WBC count is 5.9.  Patient received a total of 5 units 

of packed RBCs since admission and 5 units of platelets.  Platelets today are 

54,000.  Patient is comfortable, not in distress.





Reevaluated today on 11/9/22, remains in the ICU, hemodynamically stable, 

nonetheless the patient continues to demonstrate slight bleeding but no active 

bleeding clinically.  Hemoglobin dropped today to 7.5 compared to yesterday.  

Hemoglobin was 9.1 yesterday, stools are black and brown.  Hopefully the patient

is not actively bleeding.  But that's not definitely certain at this point.  

Patient seems to be a bit more short of breath today compared to yesterday, 

chest x-ray is showing interstitial edema, and I'm recommending a dose of Lasix 

to be given.  Patient does have possibly some underlying interstitial lung 

disease





Reevaluated today on 11/10/22, patient remains in the ICU.  Hemoglobin continues

to drop down slowly.  Clinically no active bleeding is noted.  Hemoglobin today 

is 7.1 platelets are 12,000.  Patient received so far 5 units of packed RBCs and

5 units of platelets and he is to receive another unit of packed RBCs today and 

1 unit of platelets today.  Sputum is positive for gram-negative bacilli, 

patient is on Rocephin.  Chest x-ray is showing interstitial edema and the 

patient is on Lasix today 40 mg IV push every 12 hours.  Continues to have 

shortness of breath, he is on 8 L high flow cannula, and on physical examination

he has crackles and rhonchi bilaterally.  Maintenance Lasix was ordered today.  

Renal profile is normal.  Patient has intermittent cough and wheezing shortness 

of breath noted.





Reevaluated today on 11/11/22, patient remains in the ICU, not much of a change 

over the last 24 hours.  Chest x-ray continues to show bilateral interstitial 

infiltrates, looking back, the infiltrates were not present before he started 

receiving blood transfusions, hence I'm really suspicion that the patient has 

transfusion related acute lung injury.  Patient does not improve much with 

diuretics, he is in a negative fluid balance, but his chest x-ray remains about 

the same, and no changes noted.  He did not require any transfusion over the 

last 24 hours.  Hemoglobin is 8.8.  Electrolytes showed low potassium being 

corrected as per protocol.  Patient is on 10 L high flow nasal cannula, O2 sats 

is 97% today.





Objective





- Vital Signs


Vital signs: 


                                   Vital Signs











Temp  97.6 F   11/11/22 08:00


 


Pulse  103 H  11/11/22 11:06


 


Resp  16   11/11/22 11:00


 


BP  134/87   11/11/22 11:00


 


Pulse Ox  97   11/11/22 11:00


 


FiO2      








                                 Intake & Output











 11/10/22 11/11/22 11/11/22





 18:59 06:59 18:59


 


Intake Total 880 395 100


 


Output Total 1825 2500 1550


 


Balance -945 -2105 -1450


 


Weight 86.8 kg 83.2 kg 83.2 kg


 


Intake:   


 


   120 100


 


    Sodium Chloride 0.9% 1, 160 120 50





    000 ml @ 20 mls/hr IV .   





    Q24H [facility] Rx#:[id number]   


 


    cefTRIAXone 1 gm In 100  50





    Sodium Chloride 0.9% 50   





    ml @ 100 mls/hr IVPB   





    Q24HR [facility] Rx#:[id number]   


 


  Blood Product 620 275 


 


    Platelet Pheresis Pas  275 





    Psoralen  Unit   





    [id number]   


 


    Rc Irr As1  Unit 310  





    [id number]   


 


Output:   


 


  Urine 1825 2500 1550


 


Other:   


 


  Voiding Method Urinal Urinal Urinal


 


  # Voids 0 1 1


 


  # Bowel Movements  1 














- Exam


General appearance: Revealed a 62-year-old white male in no distress.  On 10 L 

high flow cannula.


HET: Head is normocephalic and atraumatic.  Conjunctiva pink.  Sclera anicteric.


Neck: Supple without lymphadenopathy.  Trachea midline.


Heart: S1 S2.  Regular rate and rhythm.


Lungs: Crackles at the bases persists.


Abdomen: Soft, nontender, nondistended with  bowel sounds.  No guarding or 

rigidity.


Skin:  No rashes. No jaundice.


Extremities: Normal skin color and turgor.  1+  bipedal edema


Neurological: No focal deficits.  Alert and oriented x3.


Psychiatric: Normal mood, affect and normal mental status examination.


Musculoskeletal: No deformities and no limitation in range of motion














- Labs


CBC & Chem 7: 


                                 11/11/22 05:18





                                 11/11/22 05:18


Labs: 


                  Abnormal Lab Results - Last 24 Hours (Table)











  11/10/22 11/10/22 11/10/22 Range/Units





  08:54 16:04 16:10 


 


WBC    11.2 H  (3.8-10.6)  k/uL


 


RBC    2.91 L  (4.30-5.90)  m/uL


 


Hgb    8.9 L D  (13.0-17.5)  gm/dL


 


Hct    25.3 L  (39.0-53.0)  %


 


RDW    18.3 H  (11.5-15.5)  %


 


Plt Count    10 L*  (150-450)  k/uL


 


Neutrophils #     (1.3-7.7)  k/uL


 


Lymphocytes #     (1.0-4.8)  k/uL


 


Sodium     (137-145)  mmol/L


 


Potassium     (3.5-5.1)  mmol/L


 


Carbon Dioxide     (22-30)  mmol/L


 


Creatinine     (0.66-1.25)  mg/dL


 


Glucose     (74-99)  mg/dL


 


POC Glucose (mg/dL)   176 H   ()  mg/dL


 


Calcium     (8.4-10.2)  mg/dL


 


Alkaline Phosphatase     ()  U/L


 


Total Protein     (6.3-8.2)  g/dL


 


Albumin     (3.5-5.0)  g/dL


 


Crossmatch  See Detail    














  11/10/22 11/11/22 11/11/22 Range/Units





  20:26 05:18 05:18 


 


WBC     (3.8-10.6)  k/uL


 


RBC   2.90 L   (4.30-5.90)  m/uL


 


Hgb   8.8 L   (13.0-17.5)  gm/dL


 


Hct   25.3 L   (39.0-53.0)  %


 


RDW   18.5 H   (11.5-15.5)  %


 


Plt Count   27 L D   (150-450)  k/uL


 


Neutrophils #   8.1 H   (1.3-7.7)  k/uL


 


Lymphocytes #   0.4 L   (1.0-4.8)  k/uL


 


Sodium    136 L  (137-145)  mmol/L


 


Potassium    2.9 L  (3.5-5.1)  mmol/L


 


Carbon Dioxide    34 H  (22-30)  mmol/L


 


Creatinine    0.51 L  (0.66-1.25)  mg/dL


 


Glucose    156 H  (74-99)  mg/dL


 


POC Glucose (mg/dL)  242 H    ()  mg/dL


 


Calcium    7.4 L  (8.4-10.2)  mg/dL


 


Alkaline Phosphatase    138 H  ()  U/L


 


Total Protein    4.9 L  (6.3-8.2)  g/dL


 


Albumin    2.5 L  (3.5-5.0)  g/dL


 


Crossmatch     














  11/11/22 11/11/22 Range/Units





  06:38 11:15 


 


WBC    (3.8-10.6)  k/uL


 


RBC    (4.30-5.90)  m/uL


 


Hgb    (13.0-17.5)  gm/dL


 


Hct    (39.0-53.0)  %


 


RDW    (11.5-15.5)  %


 


Plt Count    (150-450)  k/uL


 


Neutrophils #    (1.3-7.7)  k/uL


 


Lymphocytes #    (1.0-4.8)  k/uL


 


Sodium    (137-145)  mmol/L


 


Potassium    (3.5-5.1)  mmol/L


 


Carbon Dioxide    (22-30)  mmol/L


 


Creatinine    (0.66-1.25)  mg/dL


 


Glucose    (74-99)  mg/dL


 


POC Glucose (mg/dL)  158 H  181 H  ()  mg/dL


 


Calcium    (8.4-10.2)  mg/dL


 


Alkaline Phosphatase    ()  U/L


 


Total Protein    (6.3-8.2)  g/dL


 


Albumin    (3.5-5.0)  g/dL


 


Crossmatch    








                      Microbiology - Last 24 Hours (Table)











 11/05/22 17:30 Blood Culture - Preliminary





 Blood    No Growth after 120 hours














Assessment and Plan


Assessment: 





Impression:


Acute GI bleeding, persistent, exact source is yet to be determined tagged RBC 

study was nondiagnostic


Pancytopenia secondary to chemotherapy


Small cell lung cancer/metastatic.


History of underlying COPD/emphysema


History of degenerative joint disease and multiple orthopedic surgeries.


History of GERD.  


Possible transfusion related acute lung injury./TRALI








Recommendation:Transfer to a stepdown unit/floor 


Continue with transfer plans to [facility].  


Continue Lasix 40 mg twice a day 


Continue to monitor daily labs including daily CBC and platelets


Continue Cortef 


Continue bronchodilatorsOverall prognosis remains poor and guarded.  


We will continue to follow


Time with Patient: Less than 30 (1111)

## 2022-11-11 NOTE — P.PN
Subjective


Progress Note Date: 11/11/22


Principal diagnosis: 





GI bleed.  SCLC, on treatment





In f/u today pt reports ongoing shortness of breath even after PRBCs-Hgb 8.8 

today, his baseline is 9's.  Pt reports last bloody/suspicious BM was 3-4 days 

ago.  





Objective





- Vital Signs


Vital signs: 


                                   Vital Signs











Temp  97.6 F   11/11/22 08:00


 


Pulse  103 H  11/11/22 11:06


 


Resp  16   11/11/22 11:00


 


BP  134/87   11/11/22 11:00


 


Pulse Ox  97   11/11/22 11:00


 


FiO2      








                                 Intake & Output











 11/10/22 11/11/22 11/11/22





 18:59 06:59 18:59


 


Intake Total 880 395 100


 


Output Total 1825 2500 1550


 


Balance -258 -0386 -9783


 


Weight 86.8 kg 83.2 kg 83.2 kg


 


Intake:   


 


   120 100


 


    Sodium Chloride 0.9% 1, 160 120 50





    000 ml @ 20 mls/hr IV .   





    Q24H [facility]ER Rx#:[id number]   


 


    cefTRIAXone 1 gm In 100  50





    Sodium Chloride 0.9% 50   





    ml @ 100 mls/hr IVPB   





    Q24HR PETER Rx#:[id number]   


 


  Blood Product 620 275 


 


    Platelet Pheresis Pas  275 





    Psoralen  Unit   





    [id number]   


 


    Rc Irr As1  Unit 310  





    [id number]   


 


Output:   


 


  Urine 1825 2500 1550


 


Other:   


 


  Voiding Method Urinal Urinal Urinal


 


  # Voids 0 1 1


 


  # Bowel Movements  1 














- Constitutional


General appearance: Present: cooperative, mild distress, obese





- EENT


Eyes: Present: anicteric sclerae, edentulous


ENT: Present: hearing grossly normal





- Respiratory


Respiratory: bilateral: CTA, diminished





- Cardiovascular


Heart sounds: normal: S1, S2


Abnormal Heart Sounds: Absent: systolic murmur, diastolic murmur, rub, S3 

Gallop, S4 Gallop, click, other





- Peripheral edema


  ** leg


Peripheral Edema: bilateral: 1+, Pitting (mid shin distal)





- Neurologic


Neurologic: Present: CNII-XII intact





- Musculoskeletal


Musculoskeletal: Present: generalized weakness, strength equal bilaterally





- Psychiatric


Psychiatric: Present: A&O x's 3, appropriate affect, intact judgment & insight





- Labs


CBC & Chem 7: 


                                 11/11/22 05:18





                                 11/11/22 05:18


Labs: 


                  Abnormal Lab Results - Last 24 Hours (Table)











  11/10/22 11/10/22 11/10/22 Range/Units





  16:04 16:10 20:26 


 


WBC   11.2 H   (3.8-10.6)  k/uL


 


RBC   2.91 L   (4.30-5.90)  m/uL


 


Hgb   8.9 L D   (13.0-17.5)  gm/dL


 


Hct   25.3 L   (39.0-53.0)  %


 


RDW   18.3 H   (11.5-15.5)  %


 


Plt Count   10 L*   (150-450)  k/uL


 


Neutrophils #     (1.3-7.7)  k/uL


 


Lymphocytes #     (1.0-4.8)  k/uL


 


Sodium     (137-145)  mmol/L


 


Potassium     (3.5-5.1)  mmol/L


 


Carbon Dioxide     (22-30)  mmol/L


 


Creatinine     (0.66-1.25)  mg/dL


 


Glucose     (74-99)  mg/dL


 


POC Glucose (mg/dL)  176 H   242 H  ()  mg/dL


 


Calcium     (8.4-10.2)  mg/dL


 


Alkaline Phosphatase     ()  U/L


 


Total Protein     (6.3-8.2)  g/dL


 


Albumin     (3.5-5.0)  g/dL














  11/11/22 11/11/22 11/11/22 Range/Units





  05:18 05:18 06:38 


 


WBC     (3.8-10.6)  k/uL


 


RBC  2.90 L    (4.30-5.90)  m/uL


 


Hgb  8.8 L    (13.0-17.5)  gm/dL


 


Hct  25.3 L    (39.0-53.0)  %


 


RDW  18.5 H    (11.5-15.5)  %


 


Plt Count  27 L D    (150-450)  k/uL


 


Neutrophils #  8.1 H    (1.3-7.7)  k/uL


 


Lymphocytes #  0.4 L    (1.0-4.8)  k/uL


 


Sodium   136 L   (137-145)  mmol/L


 


Potassium   2.9 L   (3.5-5.1)  mmol/L


 


Carbon Dioxide   34 H   (22-30)  mmol/L


 


Creatinine   0.51 L   (0.66-1.25)  mg/dL


 


Glucose   156 H   (74-99)  mg/dL


 


POC Glucose (mg/dL)    158 H  ()  mg/dL


 


Calcium   7.4 L   (8.4-10.2)  mg/dL


 


Alkaline Phosphatase   138 H   ()  U/L


 


Total Protein   4.9 L   (6.3-8.2)  g/dL


 


Albumin   2.5 L   (3.5-5.0)  g/dL














  11/11/22 Range/Units





  11:15 


 


WBC   (3.8-10.6)  k/uL


 


RBC   (4.30-5.90)  m/uL


 


Hgb   (13.0-17.5)  gm/dL


 


Hct   (39.0-53.0)  %


 


RDW   (11.5-15.5)  %


 


Plt Count   (150-450)  k/uL


 


Neutrophils #   (1.3-7.7)  k/uL


 


Lymphocytes #   (1.0-4.8)  k/uL


 


Sodium   (137-145)  mmol/L


 


Potassium   (3.5-5.1)  mmol/L


 


Carbon Dioxide   (22-30)  mmol/L


 


Creatinine   (0.66-1.25)  mg/dL


 


Glucose   (74-99)  mg/dL


 


POC Glucose (mg/dL)  181 H  ()  mg/dL


 


Calcium   (8.4-10.2)  mg/dL


 


Alkaline Phosphatase   ()  U/L


 


Total Protein   (6.3-8.2)  g/dL


 


Albumin   (3.5-5.0)  g/dL








                      Microbiology - Last 24 Hours (Table)











 11/05/22 17:30 Blood Culture - Preliminary





 Blood    No Growth after 120 hours














- Imaging and Cardiology


Chest x-ray: report reviewed





Assessment and Plan


(1) GI bleed


Current Visit: Yes   Status: Acute   Priority: High   Code(s): K92.2 - 

GASTROINTESTINAL HEMORRHAGE, UNSPECIFIED   SNOMED Code(s): 85003446


   





(2) Pancytopenia due to antineoplastic chemotherapy


Current Visit: Yes   Status: Acute   Priority: High   Code(s): D61.810 - 

ANTINEOPLASTIC CHEMOTHERAPY INDUCED PANCYTOPENIA; T45.1X5A - ADVERSE EFFECT OF 

ANTINEOPLASTIC AND IMMUNOSUP DRUGS, INIT   SNOMED Code(s): 998858175812225


   





(3) Primary small cell malignant neoplasm of lung, stage 4


Current Visit: Yes   Status: Acute   Priority: High   Code(s): C34.90 - 

MALIGNANT NEOPLASM OF UNSP PART OF UNSP BRONCHUS OR LUNG   SNOMED Code(s): 

89458761348731


   


Plan: 





Pancytopenia secondary to recent treatment with zepzelca.  Patient did receive 

G-CSF x 6.  WBC 9.3.  Platelets 27,000 today.  Hemoglobin 8.8 today.  


DIC work up negative.  No evidence of bleeding for 3-4 days now.  CBC daily.


Tagged RBC scan was non diagnostic for a source of acute bleeding.  


Shortness of breath.  Stable, no better, no worse, no improvement after PRBCs.  

He is being diruesed.    


Patient just started zepzelca, CTA reports some improvement in tumor size when 

compared to imaging 10/1.  Would like to be able to continue patient on the 

same.  There are dose reductions so, continuation of therapy after pt has 

adequately recovered (clinically and hematologically) may be a possibility.  G-

CSF can be added as well if felt necessary.  Cont to follow hospital course.


Continue pain medications as prescribed from the office. Pain seems to be 

manageable at this time.  








 attests: I have seen and examined pt, performed H&P, developed impression 

and plan of care.  Discussed with dictator.  Agree with documentation, dictated 

as a scribe.

## 2022-11-11 NOTE — XR
EXAMINATION TYPE: XR chest 1V portable

 

DATE OF EXAM: 11/11/2022

 

COMPARISON: 11/9/2022

 

HISTORY: Shortness of breath

 

TECHNIQUE: Single frontal view of the chest is obtained.

 

FINDINGS:  Postsurgical change right shoulder and arthropathy of the left shoulder with the sclerotic
 lesion suggestive of bone infarct. Diffuse interstitial pattern. Heart size normal. Left-sided PICC 
line noted. Small bilateral pleural effusion. Evidence of previous vertebroplasty.

 

IMPRESSION:  

1. Correlate for chronic pulmonary fibrosis. Pleural thickening or small effusions persist. Superimpo
sed pneumonitis not excluded.

## 2022-11-11 NOTE — P.PN
Subjective


Progress Note Date: 11/11/22


H&P Date: 11/03/22


Chief Complaint: Rectal bleeding


This is a pleasant 62-year-old gentleman with past medical history of metastatic

lung disease/ primary small cell malignant neoplasm of lung, stage IV presented 

to the ER with Sunil rectal bleeding X 3, accompanied by lower abdominal 

cramping and exertional shortness of breath.  Reports some nausea, denies 

emesis.  Also reports extensive workup of abdominal pain and per oncology 

attributing it to chemo effect. Last chemotherapy approximately 1 week ago. 

Chest CTA reported : no evidence of pulmonary embolism.  Mediastinal left 

bronchial adenopathy with improvement in the left hilar mass compared to old 

exam.  Slightly decreased left upper lobe spiculated infiltrate extending from 

the left pulmonary hilum to the left lung apex,compared to old exam.  Improved 

encasement of the left lower lobe pulmonary artery with tumor mass and artery 

narrowing compared to old exam.COPD. Increased patchy bilateral reticular 

pulmonary interstitial infiltrates compared to prior exam.  Increased small left

pleural effusion compared to prior exam.  Pericardial tumor masses not 

significantly different than last exam.  On admission :WBC 0.2 hemoglobin 6.5 

hematocrit 18.9 platelet count 5 INR 1.0 d-dimer 1.76 sodium 136 potassium 4.4 

BUN 12 creatinine 0.63 glucose 115, bilirubin 0.4 AST 23 ALT 22 alk phos 60. 

Repeat labs today WBC 0.1 hemoglobin 7.5 hematocrit 22 platelet count 7. 

Received 1 unit of packed red blood cell transfusion as well as 1 unit of 

platelets.  Denies any further rectal bleeding since admission.  Denies 

shortness of breath at rest.  Denies chest pain, palpitations.








11/04/2022 maintained on IV fluid hydration, Zarxio,PPI, carafate .reports dark 

maroon stooling 2 last night.  Denies any further bleeding this morning.  

Denies abdominal cramping, abdominal pain.  Consuming 25-75% with no nausea 

vomiting. Received a total of 3 units packed RBCs, 2 units of platelets.  WBC 

increased to 0.5, hemoglobin 7.9, platelets 28 .Afebrile.  Denies chest pain, 

palpitations or shortness of breath.  Complains of exertional shortness of 

breath.





Evaluated by both GI and oncology with recommendations noted and appreciated.  

Neutropenic precautions. Maintained on zaxrio,PPI, Carafate,empiric Augmentin 

and doxycycline. Transfusion parameters as per oncology. 





11/07/2022 Over the weekend, patient had reoccurrence of rectal bleeding 

accompanied by acute drop in hemoglobin to 6 and platelets to 8 requiring 

transfer into the ICU.Maintained on supportive transfusions to maintain 

hemoglobin greater than 7, platelets greater than 50.  Post transfusion, 

hemoglobin currently up to 9, platelets 42.  No bowel movement since yesterday 

.Denies abdominal pain .  Denies chest pain, palpitations or shortness of 

breath.  Chronic back pain controlled.  Patient in need of GI 

evaluation.Transfer to [facility],in progress related to GI services not 

available at this site, this week.  Prognosis guarded given multiple complex 

medical issues. 





11/08/2022 transfer to tertiary care center pending.  Patient continued to have 

maroon bowel movements yesterday, last night and throughout the midnight shift. 

Repeat blood counts of yesterday reported hemoglobin dropping to 8.5, platelets 

down to 60 with a.m. labs pending.  Patient reports chills, increased shortness 

of breath at rest. Maintaining O2 sats in the 90s on 4 L nasal cannula. Minimal 

cough, including dark brownish sputum-cultures sent; possible microscopic 

bleeding. Afebrile, WBC of yesterday 4.2.  Supportive transfusions.  Denies 

chest pain, palpitations. ICU management as per intensivist. Awaiting bed for 

transfer.








11/09/2022 hemoglobin decreased to 7.5, platelets 24, neutrophils 85.  No 

further stooling this morning.  Staff reports patient had 4 bowel movements with

minimal rectal bleeding yesterday that initially subsided-last bowel movement 

reported brown in color.  Denies abdominal pain.Increased shortness of breath at

rest with oxygen requirements worsened, requiring 8 L high flow nasal cannula to

maintain O2 sats in the low 90s.  Mild tachycardia this morning with heart rates

in the 1 teens.  Afebrile, normal WBC.  Receiving potassium supplements for 

potassium 3.2.  Renal function stable.  Blood sugars controlled.  Chest x-ray 

reporting bilateral pleural thickening/interstitial edema, suspect chronic idi

opathic pulmonary fibrosis. IV fluids pivl'd, received a dose of Lasix IV push. 

Tagged RBC reported no evidence of active GI bleeding during the initial 1 hour 

of observation.








11/10/2022 Tagged RBC nondiagnostic.  Continues on 8 L high flow nasal cannula 

maintaining O2 sats in the high 80s to low 90s.  Feels about the same -Shortness

of breath unchanged. Mild tachycardia.  2 bowel movements yesterday reported 

normal/brown.  Denies nausea/vomiting.  Denies abdominal pain.  Requesting diet 

advancement from clear liquids.  Blood sugars controlled.  Chronic back pain 

controlled.  Hemoglobin decreased to 7.1, platelets decreased to 12.  Potassium 

supplemented yesterday, currently 4.1. Renal function stable.  Denies chest 

pain, palpitations.  Afebrile, WBC 9.4.








11/11/22  no further bleeding reported, normal bowel movement yesterday.  

Tolerating advanced diet with no nausea vomiting or diarrhea.  Denies abdominal 

pain. Feels "puffy" and short of breath at rest, worsened by minimal exertion.  

Patient has received a total of 6 units packed RBCs, 6 units of platelets. 

Diuresing with Lasix IV push with 24-hour I&O reflecting a negative fluid 

balance. Maintaining O2 sats in the low 90s on 2 L high flow nasal cannula. 

Chest x-ray reporting pleural thickening or small effusions persist /correlate 

for chronic pulmonary fibrosis. Hemoglobin 8.8, platelets 27. Potassium 2.9.





Objective





- Vital Signs


Vital signs: 


                                   Vital Signs











Temp  97.6 F   11/11/22 08:00


 


Pulse  103 H  11/11/22 11:06


 


Resp  16   11/11/22 11:00


 


BP  134/87   11/11/22 11:00


 


Pulse Ox  97   11/11/22 11:00


 


FiO2      








                                 Intake & Output











 11/10/22 11/11/22 11/11/22





 18:59 06:59 18:59


 


Intake Total 880 395 100


 


Output Total 1825 2500 1550


 


Balance -946 -3581 -2507


 


Weight 86.8 kg 83.2 kg 83.2 kg


 


Intake:   


 


   120 100


 


    Sodium Chloride 0.9% 1, 160 120 50





    000 ml @ 20 mls/hr IV .   





    Q24H PETER Rx#:[id number]   


 


    cefTRIAXone 1 gm In 100  50





    Sodium Chloride 0.9% 50   





    ml @ 100 mls/hr IVPB   





    Q24HR PETER Rx#:[id number]   


 


  Blood Product 620 275 


 


    Platelet Pheresis Pas  275 





    Psoralen  Unit   





    [id number]   


 


    Rc Irr As1  Unit 310  





    [id number]   


 


Output:   


 


  Urine 1825 2500 1550


 


Other:   


 


  Voiding Method Urinal Urinal Urinal


 


  # Voids 0 1 1


 


  # Bowel Movements  1 














- Exam





- Exam


PHYSICAL EXAM:


VITAL SIGNS: [As above]


GENERAL: Alert and oriented 3, Sitting up in bed,no acute distress


HEENT:  Conjunctivae normal. eyes normal.


NECK: Supple, No JVD.


CARDIOVASCULAR:  S1, S2 regular, mild tachycardia. No murmur.


RESPIRATION: Respiratory effort increased, Breath sounds diminished in the 

bases.  Positive rhonchi, bibasilar crackles, no wheezing. 


ABDOMEN:  Soft, nontender, No guarding.Bowel sounds heard.


LEGS: Mild edema


NERVOUS SYSTEM:  Cranial N 2-12 grossly normal.No focal deficits. Strength and 

sensation grossly intact.


Skin: Warm and dry, no rash 











- Labs


CBC & Chem 7: 


                                 11/11/22 05:18





                                 11/11/22 05:18


Labs: 


                  Abnormal Lab Results - Last 24 Hours (Table)











  11/10/22 11/10/22 11/10/22 Range/Units





  08:54 11:51 16:04 


 


WBC     (3.8-10.6)  k/uL


 


RBC     (4.30-5.90)  m/uL


 


Hgb     (13.0-17.5)  gm/dL


 


Hct     (39.0-53.0)  %


 


RDW     (11.5-15.5)  %


 


Plt Count     (150-450)  k/uL


 


Neutrophils #     (1.3-7.7)  k/uL


 


Lymphocytes #     (1.0-4.8)  k/uL


 


Sodium     (137-145)  mmol/L


 


Potassium     (3.5-5.1)  mmol/L


 


Carbon Dioxide     (22-30)  mmol/L


 


Creatinine     (0.66-1.25)  mg/dL


 


Glucose     (74-99)  mg/dL


 


POC Glucose (mg/dL)   164 H  176 H  ()  mg/dL


 


Calcium     (8.4-10.2)  mg/dL


 


Alkaline Phosphatase     ()  U/L


 


Total Protein     (6.3-8.2)  g/dL


 


Albumin     (3.5-5.0)  g/dL


 


Crossmatch  See Detail    














  11/10/22 11/10/22 11/11/22 Range/Units





  16:10 20:26 05:18 


 


WBC  11.2 H    (3.8-10.6)  k/uL


 


RBC  2.91 L   2.90 L  (4.30-5.90)  m/uL


 


Hgb  8.9 L D   8.8 L  (13.0-17.5)  gm/dL


 


Hct  25.3 L   25.3 L  (39.0-53.0)  %


 


RDW  18.3 H   18.5 H  (11.5-15.5)  %


 


Plt Count  10 L*   27 L D  (150-450)  k/uL


 


Neutrophils #    8.1 H  (1.3-7.7)  k/uL


 


Lymphocytes #    0.4 L  (1.0-4.8)  k/uL


 


Sodium     (137-145)  mmol/L


 


Potassium     (3.5-5.1)  mmol/L


 


Carbon Dioxide     (22-30)  mmol/L


 


Creatinine     (0.66-1.25)  mg/dL


 


Glucose     (74-99)  mg/dL


 


POC Glucose (mg/dL)   242 H   ()  mg/dL


 


Calcium     (8.4-10.2)  mg/dL


 


Alkaline Phosphatase     ()  U/L


 


Total Protein     (6.3-8.2)  g/dL


 


Albumin     (3.5-5.0)  g/dL


 


Crossmatch     














  11/11/22 11/11/22 11/11/22 Range/Units





  05:18 06:38 11:15 


 


WBC     (3.8-10.6)  k/uL


 


RBC     (4.30-5.90)  m/uL


 


Hgb     (13.0-17.5)  gm/dL


 


Hct     (39.0-53.0)  %


 


RDW     (11.5-15.5)  %


 


Plt Count     (150-450)  k/uL


 


Neutrophils #     (1.3-7.7)  k/uL


 


Lymphocytes #     (1.0-4.8)  k/uL


 


Sodium  136 L    (137-145)  mmol/L


 


Potassium  2.9 L    (3.5-5.1)  mmol/L


 


Carbon Dioxide  34 H    (22-30)  mmol/L


 


Creatinine  0.51 L    (0.66-1.25)  mg/dL


 


Glucose  156 H    (74-99)  mg/dL


 


POC Glucose (mg/dL)   158 H  181 H  ()  mg/dL


 


Calcium  7.4 L    (8.4-10.2)  mg/dL


 


Alkaline Phosphatase  138 H    ()  U/L


 


Total Protein  4.9 L    (6.3-8.2)  g/dL


 


Albumin  2.5 L    (3.5-5.0)  g/dL


 


Crossmatch     








                      Microbiology - Last 24 Hours (Table)











 11/05/22 17:30 Blood Culture - Preliminary





 Blood    No Growth after 120 hours














Assessment and Plan


Assessment: 


Acute GI bleed, status post transfusion of packed RBCs and platelets.  No 

endoscopy recommended per GI.  Nondiagnostic RBC scan.


Pancytopenia secondary to recent treatment,Last received chemotherapy 1 week 

ago. 


Chronic abdominal, back pain due to metastatic lung disease in a patient with 

history of primary small cell malignant neoplasm of lung, stage IV.  Fentanyl 

patch dose increased last week with improvement in pain.


History of Adrenal mass likely secondary to metastatic lesion.


COPD


GERD


Anxiety/depression


Previous history of smoking


Adrenal insufficiency

















Plan: Continue on current medication regime ,monitoring and symptomatic suri

tment.  Potassium supplementation as per replacement protocol as previously 

ordered .Maintain diuretics. Close monitoring of coags.ICU management as per 

intensivist .Prognosis guarded given multiple complex medical issues.














The impression and plan of care has been dictated as directed.





:


I performed a history and examination of this patient,  discussed the same with 

the dictator.  I agree with the dictator's note ,documented as a scribe.  Any 

additional findings or plans will be noted.

## 2022-11-11 NOTE — P.PN
Subjective


Progress Note Date: 11/11/22





CHIEF COMPLAINT: Acute GI bleeding





HISTORY OF PRESENT ILLNESS: Patient remains in the ICU.  Patient has had no 

further bloody bowel movements.  He denies any abdominal pain.  Hemoglobin is 

remained stable.  Afebrile.  WBC is 9.3 hemoglobin 8.8 platelets are 27 patient 

received 1 unit of blood and 1 unit of platelets.  Hemoglobin came up from 7.1-

8.8 and platelets improved from 10-27.  Sodium 136 potassium is 2.9 creatinine 

0.51





Patient seen and examined with Dr. Mcbride





PHYSICAL EXAM: 


VITAL SIGNS: Reviewed.


GENERAL: Well-developed in no acute distress. 


HEENT:  No sclera icterus. Extraocular movements grossly intact.  Moist buccal 

mucosa. Head is atraumatic, normocephalic. 


ABDOMEN:  Soft.  Nondistended. Nontender. 


NEUROLOGIC: Alert and oriented. Cranial nerves II through XII grossly intact.





ASSESSMENT: 


1.  Acute GI bleed with bright red blood per rectum status post blood 

transfusion


2.  History of small cell lung cancer


3.  Pancytopenia 


4.  Hypokalemia





PLAN: 


-Continue regular diet


-Continue to monitor hemoglobin


-Continue monitor for any signs or symptoms of bleeding


-Continue PPI


-Continue transfer plans to [facility]


-No plans for endoscopy


-replace potassium





Physician Assistant note has been reviewed by physician. Signing provider agrees

with the documented findings, assessment, and plan of care. 





Objective





- Vital Signs


Vital signs: 


                                   Vital Signs











Temp  97.6 F   11/11/22 08:00


 


Pulse  103 H  11/11/22 11:06


 


Resp  16   11/11/22 11:00


 


BP  134/87   11/11/22 11:00


 


Pulse Ox  97   11/11/22 11:00


 


FiO2      








                                 Intake & Output











 11/10/22 11/11/22 11/11/22





 18:59 06:59 18:59


 


Intake Total 880 395 100


 


Output Total 1825 3278 1550


 


Balance -923 -0277 -4268


 


Weight 86.8 kg 83.2 kg 83.2 kg


 


Intake:   


 


   120 100


 


    Sodium Chloride 0.9% 1, 160 120 50





    000 ml @ 20 mls/hr IV .   





    Q24H PETER Rx#:[id number]   


 


    cefTRIAXone 1 gm In 100  50





    Sodium Chloride 0.9% 50   





    ml @ 100 mls/hr IVPB   





    Q24HR PETER Rx#:[id number]   


 


  Blood Product 620 275 


 


    Platelet Pheresis Pas  275 





    Psoralen  Unit   





    [id number]   


 


    Rc Irr As1  Unit 310  





    [id number]   


 


Output:   


 


  Urine 1825 2500 1550


 


Other:   


 


  Voiding Method Urinal Urinal Urinal


 


  # Voids 0 1 1


 


  # Bowel Movements  1 














- Labs


CBC & Chem 7: 


                                 11/11/22 05:18





                                 11/11/22 05:18


Labs: 


                  Abnormal Lab Results - Last 24 Hours (Table)











  11/10/22 11/10/22 11/10/22 Range/Units





  08:54 16:04 16:10 


 


WBC    11.2 H  (3.8-10.6)  k/uL


 


RBC    2.91 L  (4.30-5.90)  m/uL


 


Hgb    8.9 L D  (13.0-17.5)  gm/dL


 


Hct    25.3 L  (39.0-53.0)  %


 


RDW    18.3 H  (11.5-15.5)  %


 


Plt Count    10 L*  (150-450)  k/uL


 


Neutrophils #     (1.3-7.7)  k/uL


 


Lymphocytes #     (1.0-4.8)  k/uL


 


Sodium     (137-145)  mmol/L


 


Potassium     (3.5-5.1)  mmol/L


 


Carbon Dioxide     (22-30)  mmol/L


 


Creatinine     (0.66-1.25)  mg/dL


 


Glucose     (74-99)  mg/dL


 


POC Glucose (mg/dL)   176 H   ()  mg/dL


 


Calcium     (8.4-10.2)  mg/dL


 


Alkaline Phosphatase     ()  U/L


 


Total Protein     (6.3-8.2)  g/dL


 


Albumin     (3.5-5.0)  g/dL


 


Crossmatch  See Detail    














  11/10/22 11/11/22 11/11/22 Range/Units





  20:26 05:18 05:18 


 


WBC     (3.8-10.6)  k/uL


 


RBC   2.90 L   (4.30-5.90)  m/uL


 


Hgb   8.8 L   (13.0-17.5)  gm/dL


 


Hct   25.3 L   (39.0-53.0)  %


 


RDW   18.5 H   (11.5-15.5)  %


 


Plt Count   27 L D   (150-450)  k/uL


 


Neutrophils #   8.1 H   (1.3-7.7)  k/uL


 


Lymphocytes #   0.4 L   (1.0-4.8)  k/uL


 


Sodium    136 L  (137-145)  mmol/L


 


Potassium    2.9 L  (3.5-5.1)  mmol/L


 


Carbon Dioxide    34 H  (22-30)  mmol/L


 


Creatinine    0.51 L  (0.66-1.25)  mg/dL


 


Glucose    156 H  (74-99)  mg/dL


 


POC Glucose (mg/dL)  242 H    ()  mg/dL


 


Calcium    7.4 L  (8.4-10.2)  mg/dL


 


Alkaline Phosphatase    138 H  ()  U/L


 


Total Protein    4.9 L  (6.3-8.2)  g/dL


 


Albumin    2.5 L  (3.5-5.0)  g/dL


 


Crossmatch     














  11/11/22 11/11/22 Range/Units





  06:38 11:15 


 


WBC    (3.8-10.6)  k/uL


 


RBC    (4.30-5.90)  m/uL


 


Hgb    (13.0-17.5)  gm/dL


 


Hct    (39.0-53.0)  %


 


RDW    (11.5-15.5)  %


 


Plt Count    (150-450)  k/uL


 


Neutrophils #    (1.3-7.7)  k/uL


 


Lymphocytes #    (1.0-4.8)  k/uL


 


Sodium    (137-145)  mmol/L


 


Potassium    (3.5-5.1)  mmol/L


 


Carbon Dioxide    (22-30)  mmol/L


 


Creatinine    (0.66-1.25)  mg/dL


 


Glucose    (74-99)  mg/dL


 


POC Glucose (mg/dL)  158 H  181 H  ()  mg/dL


 


Calcium    (8.4-10.2)  mg/dL


 


Alkaline Phosphatase    ()  U/L


 


Total Protein    (6.3-8.2)  g/dL


 


Albumin    (3.5-5.0)  g/dL


 


Crossmatch    








                      Microbiology - Last 24 Hours (Table)











 11/05/22 17:30 Blood Culture - Preliminary





 Blood    No Growth after 120 hours

## 2023-08-22 NOTE — XR
Writer called [facility] and spoke to Shauna who stated patient can come with paper scripts.     Treatment team updated.     RAMON Madsen to set transport at 10AM.     Writer updated patient. Patient concerned about not having clothing. Writer explained treatment center should have extra clothing- patient hung up phone.      Chemical Dependency      EXAMINATION TYPE: XR chest 2V

 

DATE OF EXAM: 11/2/2022 9:48 PM

 

COMPARISON: Chest radiographs from CT 10/1/2022, chest radiograph 7/27/2022

 

TECHNIQUE: XR chest 2V Frontal and lateral views of the chest.

 

CLINICAL INDICATION:Male, 62 years old with history of difficulty breathing; 

 

FINDINGS: 

Lungs/Pleura: Left perihilar and left upper lobe again demonstrated and less well visualized. Scatter
ed subtle reticular and hazy opacities. No evidence of pneumothorax, focal consolidation or pleural e
ffusion. 

Pulmonary vascularity: Unremarkable.

Heart/mediastinum: Cardiomediastinal silhouette is unremarkable.

Musculoskeletal: No acute osseous pathology. Right shoulder arthroplasty. Deformity left proximal hum
erus likely from prior injury.

 

IMPRESSION: 

1.  Subtle scattered opacities which may represent pneumonia. 

2.  Left upper lobe and left perihilar mass as seen on prior CT are better characterized on CT.

## 2024-03-08 NOTE — CT
Condition:: History and physical
EXAMINATION TYPE: CT angio chest

 

DATE OF EXAM: 3/9/2022 4:26 PM

 

COMPARISON: 2/1/2022

 

HISTORY: SOB, back pain. History of lung cancer.

 

CT DLP: 399.2 mGycm

Automated exposure control for dose reduction was used.

 

CONTRAST: 

CTA scan of the thorax is performed with IV Contrast, patient injected with 80 mL of Isovue 370, pulm
onary embolism protocol.  MIP images are created and reviewed.  

 

FINDINGS:

 

LUNGS: There is interval increase in size of 5.3 x 3.3 cm irregular left upper lobe perihilar mass (p
reviously measured 3.4 x 2.2 cm). The mass encases the adjacent pulmonary arteries without occlusion.
 There are increased scattered numerous subpleural nodules throughout the left lung, greater than 7 i
n number and measuring 0.3 cm to 3.3 cm. Additional scattered few intraparenchymal nodules in the lef
t upper and lower lobes measuring up to 4 mm also seen. Also new 4 mm right middle lobe nodule. There
 is background of moderate centrilobular emphysema. There are persistent diffuse mild peripheral opac
ities. No pleural effusion or pneumothorax.

 

MEDIASTINUM: There is satisfactory enhancement of the pulmonary artery and its branches, there is no 
CT evidence for pulmonary embolism. There are increased scattered numerous mediastinal and hilar lymp
h nodes. Index 3.8 x 3.5 cm right paratracheal (previously 2.7 x 2 cm) and 3.5 x 2.6 cm left hilar ly
mph nodes are noted (previously measures up to 2.7 cm). Small pericardial effusion is seen. Increased
 multiple pericardial nodules/lymph nodes measuring up to 1 cm also seen.

 

 

OTHER: Chronic multiple lower thoracic vertebral compression fractures and T12 vertebroplasty seen. A
lso few nodules measuring up to 1.4 cm in the left paravertebral region at T9-T11 seen.

 

 

IMPRESSION: 

NO ACUTE PE.

 

PROGRESSION OF LEFT UPPER LOBE PERIHILAR MASS WITH BILATERAL PULMONARY NODULES AND MEDIASTINAL/HILAR 
LYMPHADENOPATHY AS DESCRIBED.

 

Increased multiple pericardial nodules/lymph nodes as well as increase prominent left paravertebral r
egion nodules.
Please Describe Your Condition:: Patient presents for history and physical for MOHs surgery